# Patient Record
Sex: FEMALE | Race: WHITE | NOT HISPANIC OR LATINO | Employment: OTHER | URBAN - METROPOLITAN AREA
[De-identification: names, ages, dates, MRNs, and addresses within clinical notes are randomized per-mention and may not be internally consistent; named-entity substitution may affect disease eponyms.]

---

## 2017-02-02 ENCOUNTER — PATIENT MESSAGE (OUTPATIENT)
Dept: INTERNAL MEDICINE | Facility: CLINIC | Age: 72
End: 2017-02-02

## 2017-02-08 ENCOUNTER — TELEPHONE (OUTPATIENT)
Dept: INTERNAL MEDICINE | Facility: CLINIC | Age: 72
End: 2017-02-08

## 2017-02-08 NOTE — TELEPHONE ENCOUNTER
GO form for travel completed today    Please remind her to get FLU shot    Need EPP June 2017- please schedule or place on hold list thanks

## 2017-02-22 ENCOUNTER — OFFICE VISIT (OUTPATIENT)
Dept: OPTOMETRY | Facility: CLINIC | Age: 72
End: 2017-02-22
Payer: MEDICARE

## 2017-02-22 DIAGNOSIS — H52.4 BILATERAL PRESBYOPIA: ICD-10-CM

## 2017-02-22 DIAGNOSIS — H25.13 NUCLEAR SCLEROSIS, BILATERAL: Primary | ICD-10-CM

## 2017-02-22 PROCEDURE — 92014 COMPRE OPH EXAM EST PT 1/>: CPT | Mod: S$GLB,,, | Performed by: OPTOMETRIST

## 2017-02-22 PROCEDURE — 99999 PR PBB SHADOW E&M-EST. PATIENT-LVL III: CPT | Mod: PBBFAC,,, | Performed by: OPTOMETRIST

## 2017-02-22 NOTE — PROGRESS NOTES
HPI     Ross Rodriguez is a/an 71 y.o. Female  Who comes in for continued eye care   She reports  that she has no trouble with her  vision  with her current   glasses. She uses no glasses for her distance and readers for near vision.         (--)blurred vision  (--)Headaches  (--)diplopia  (--)flashes  (--)floaters  (--)pain  (--)Itching  (--)tearing  (--)burning  (--)Dryness  (--) OTC Drops  (--)Photophobia       Last edited by Kalpana Mao on 2/22/2017 11:43 AM.     ROS     Negative for: Constitutional, Gastrointestinal, Neurological, Skin,   Genitourinary, Musculoskeletal, HENT, Endocrine, Cardiovascular, Eyes,   Respiratory, Psychiatric, Allergic/Imm, Heme/Lymph    Last edited by Quan Lozano, OD on 2/22/2017 11:57 AM. (History)        Assessment /Plan     For exam results, see Encounter Report.    Nuclear sclerosis, bilateral    Bilateral presbyopia      1. Educated pt on presence of cataracts and effects on vision. No surgery at this time. Recheck in one year.  2. Cont with OTC readers.

## 2017-06-23 ENCOUNTER — PATIENT MESSAGE (OUTPATIENT)
Dept: INTERNAL MEDICINE | Facility: CLINIC | Age: 72
End: 2017-06-23

## 2017-06-23 DIAGNOSIS — E55.9 VITAMIN D DEFICIENCY DISEASE: ICD-10-CM

## 2017-06-23 DIAGNOSIS — M81.8 OTHER OSTEOPOROSIS: ICD-10-CM

## 2017-06-23 DIAGNOSIS — E78.2 MIXED HYPERLIPIDEMIA: Primary | ICD-10-CM

## 2017-06-23 DIAGNOSIS — Z12.31 SCREENING MAMMOGRAM, ENCOUNTER FOR: ICD-10-CM

## 2017-06-23 NOTE — TELEPHONE ENCOUNTER
Pt will like  orders for Mammo, Bone Density and blood work.  Likely to do blood work on the 6th.

## 2017-06-23 NOTE — TELEPHONE ENCOUNTER
Spoke to pt scheduled pt appt for mammo and bone density at [facility] on Wednesday and labs on the 6th of July

## 2017-06-28 ENCOUNTER — HOSPITAL ENCOUNTER (OUTPATIENT)
Dept: RADIOLOGY | Facility: HOSPITAL | Age: 72
Discharge: HOME | End: 2017-06-28
Attending: INTERNAL MEDICINE
Payer: MEDICARE

## 2017-06-28 ENCOUNTER — HOSPITAL ENCOUNTER (OUTPATIENT)
Dept: RADIOLOGY | Facility: CLINIC | Age: 72
Discharge: HOME | End: 2017-06-28
Attending: INTERNAL MEDICINE
Payer: MEDICARE

## 2017-06-28 VITALS — HEIGHT: 60 IN | BODY MASS INDEX: 31.02 KG/M2 | WEIGHT: 158 LBS

## 2017-06-28 DIAGNOSIS — M81.8 OTHER OSTEOPOROSIS: ICD-10-CM

## 2017-06-28 DIAGNOSIS — Z12.31 SCREENING MAMMOGRAM, ENCOUNTER FOR: ICD-10-CM

## 2017-06-28 PROCEDURE — 77067 SCR MAMMO BI INCL CAD: CPT | Mod: TC

## 2017-06-28 PROCEDURE — 77063 BREAST TOMOSYNTHESIS BI: CPT | Mod: 26,,, | Performed by: RADIOLOGY

## 2017-06-28 PROCEDURE — 77067 SCR MAMMO BI INCL CAD: CPT | Mod: 26,,, | Performed by: RADIOLOGY

## 2017-06-28 PROCEDURE — 77080 DXA BONE DENSITY AXIAL: CPT | Mod: TC

## 2017-06-28 PROCEDURE — 77080 DXA BONE DENSITY AXIAL: CPT | Mod: 26,,, | Performed by: INTERNAL MEDICINE

## 2017-07-03 DIAGNOSIS — I71.21 ASCENDING AORTIC ANEURYSM: Primary | ICD-10-CM

## 2017-07-04 ENCOUNTER — PATIENT MESSAGE (OUTPATIENT)
Dept: INTERNAL MEDICINE | Facility: CLINIC | Age: 72
End: 2017-07-04

## 2017-07-06 ENCOUNTER — LAB VISIT (OUTPATIENT)
Dept: LAB | Facility: HOSPITAL | Age: 72
End: 2017-07-06
Attending: INTERNAL MEDICINE
Payer: MEDICARE

## 2017-07-06 ENCOUNTER — PATIENT MESSAGE (OUTPATIENT)
Dept: INTERNAL MEDICINE | Facility: CLINIC | Age: 72
End: 2017-07-06

## 2017-07-06 DIAGNOSIS — E78.2 MIXED HYPERLIPIDEMIA: ICD-10-CM

## 2017-07-06 DIAGNOSIS — E55.9 VITAMIN D DEFICIENCY DISEASE: ICD-10-CM

## 2017-07-06 LAB
25(OH)D3+25(OH)D2 SERPL-MCNC: 69 NG/ML
CHOLEST/HDLC SERPL: 4 {RATIO}
HDL/CHOLESTEROL RATIO: 25.2 %
HDLC SERPL-MCNC: 202 MG/DL
HDLC SERPL-MCNC: 51 MG/DL
LDLC SERPL CALC-MCNC: 111 MG/DL
NONHDLC SERPL-MCNC: 151 MG/DL
TRIGL SERPL-MCNC: 200 MG/DL

## 2017-07-06 PROCEDURE — 36415 COLL VENOUS BLD VENIPUNCTURE: CPT | Mod: PO

## 2017-07-06 PROCEDURE — 80061 LIPID PANEL: CPT

## 2017-07-06 PROCEDURE — 82306 VITAMIN D 25 HYDROXY: CPT | Mod: PO

## 2017-07-11 ENCOUNTER — OFFICE VISIT (OUTPATIENT)
Dept: INTERNAL MEDICINE | Facility: CLINIC | Age: 72
End: 2017-07-11
Payer: MEDICARE

## 2017-07-11 VITALS
HEIGHT: 61 IN | HEIGHT: 61 IN | SYSTOLIC BLOOD PRESSURE: 118 MMHG | HEART RATE: 73 BPM | BODY MASS INDEX: 30.39 KG/M2 | WEIGHT: 160.94 LBS | SYSTOLIC BLOOD PRESSURE: 118 MMHG | BODY MASS INDEX: 30.39 KG/M2 | DIASTOLIC BLOOD PRESSURE: 60 MMHG | WEIGHT: 160.94 LBS | DIASTOLIC BLOOD PRESSURE: 60 MMHG

## 2017-07-11 DIAGNOSIS — Z00.00 ENCOUNTER FOR PREVENTIVE HEALTH EXAMINATION: Primary | ICD-10-CM

## 2017-07-11 DIAGNOSIS — E78.2 MIXED HYPERLIPIDEMIA: ICD-10-CM

## 2017-07-11 DIAGNOSIS — M81.0 OSTEOPOROSIS WITHOUT CURRENT PATHOLOGICAL FRACTURE, UNSPECIFIED OSTEOPOROSIS TYPE: ICD-10-CM

## 2017-07-11 DIAGNOSIS — I77.819 AORTIC ECTASIA: ICD-10-CM

## 2017-07-11 DIAGNOSIS — Z00.00 ANNUAL PHYSICAL EXAM: Primary | ICD-10-CM

## 2017-07-11 DIAGNOSIS — M81.0 AGE-RELATED OSTEOPOROSIS WITHOUT CURRENT PATHOLOGICAL FRACTURE: ICD-10-CM

## 2017-07-11 DIAGNOSIS — K21.9 GASTROESOPHAGEAL REFLUX DISEASE WITHOUT ESOPHAGITIS: ICD-10-CM

## 2017-07-11 DIAGNOSIS — K57.50 DIVERTICULOSIS OF BOTH SMALL AND LARGE INTESTINE WITHOUT BLEEDING: ICD-10-CM

## 2017-07-11 PROCEDURE — 99999 PR PBB SHADOW E&M-EST. PATIENT-LVL III: CPT | Mod: PBBFAC,,, | Performed by: NURSE PRACTITIONER

## 2017-07-11 PROCEDURE — 99999 PR PBB SHADOW E&M-EST. PATIENT-LVL IV: CPT | Mod: PBBFAC,,, | Performed by: INTERNAL MEDICINE

## 2017-07-11 PROCEDURE — 99499 UNLISTED E&M SERVICE: CPT | Mod: S$GLB,,, | Performed by: NURSE PRACTITIONER

## 2017-07-11 PROCEDURE — 99397 PER PM REEVAL EST PAT 65+ YR: CPT | Mod: S$GLB,,, | Performed by: INTERNAL MEDICINE

## 2017-07-11 PROCEDURE — 99499 UNLISTED E&M SERVICE: CPT | Mod: S$GLB,,, | Performed by: INTERNAL MEDICINE

## 2017-07-11 PROCEDURE — G0439 PPPS, SUBSEQ VISIT: HCPCS | Mod: S$GLB,,, | Performed by: NURSE PRACTITIONER

## 2017-07-11 NOTE — PATIENT INSTRUCTIONS
Counseling and Referral of Other Preventative  (Italic type indicates deductible and co-insurance are waived)    Patient Name: Ross Rodriguez  Today's Date: 7/11/2017      SERVICE LIMITATIONS RECOMMENDATION    Vaccines    · Pneumococcal (once after 65)    · Influenza (annually)    · Hepatitis B (if medium/high risk)    · Prevnar 13      Hepatitis B medium/high risk factors:       - End-stage renal disease       - Hemophiliacs who received Factor VII or         IX concentrates       - Clients of institutions for the mentally             retarded       - Persons who live in the same house as          a HepB carrier       - Homosexual men       - Illicit injectable drug abusers     Pneumococcal: Done, no repeat necessary     Influenza: due fall 2017     Hepatitis B: N/A     Prevnar 13: Done, no repeat necessary    Mammogram (biennial age 50-74)  Annually (age 40 or over)  Done this year, repeat every year    Pap (up to age 70 and after 70 if unknown history or abnormal study last 10 years)    N/A     The USPSTF recommends against screening for cervical cancer in women older than age 65 years who have had adequate prior screening and are not otherwise at high risk for cervical cancer.      Colorectal cancer screening (to age 75)    · Fecal occult blood test (annual)  · Flexible sigmoidoscopy (5y)  · Screening colonoscopy (10y)  · Barium enema   Last done 7/2015, recommend to repeat every 10  years    Diabetes self-management training (no USPSTF recommendations)  Requires referral by treating physician for patient with diabetes or renal disease. 10 hours of initial DSMT sessions of no less than 30 minutes each in a continuous 12-month period. 2 hours of follow-up DSMT in subsequent years.  N/A    Bone mass measurements (age 65 & older, biennial)  Requires diagnosis related to osteoporosis or estrogen deficiency. Biennial benefit unless patient has history of long-term glucocorticoid  Last done 2017, recommend to repeat  every 4  years    Glaucoma screening (no USPSTF recommendation)  Diabetes mellitus, family history   , age 50 or over    American, age 65 or over  Done this year, repeat every year    Medical nutrition therapy for diabetes or renal disease (no recommended schedule)  Requires referral by treating physician for patient with diabetes or renal disease or kidney transplant within the past 3 years.  Can be provided in same year as diabetes self-management training (DSMT), and CMS recommends medical nutrition therapy take place after DSMT. Up to 3 hours for initial year and 2 hours in subsequent years.  N/A    Cardiovascular screening blood tests (every 5 years)  · Fasting lipid panel  Order as a panel if possible  Done this year, repeat every year    Diabetes screening tests (at least every 3 years, Medicare covers annually or at 6-month intervals for prediabetic patients)  · Fasting blood sugar (FBS) or glucose tolerance test (GTT)  Patient must be diagnosed with one of the following:       - Hypertension       - Dyslipidemia       - Obesity (BMI 30kg/m2)       - Previous elevated impaired FBS or GTT       ... or any two of the following:       - Overweight (BMI 25 but <30)       - Family history of diabetes       - Age 65 or older       - History of gestational diabetes or birth of baby weighing more than 9 pounds  Done this year, repeat every year    Abdominal aortic aneurysm screening (once)  · Sonogram   Limited to patients who meet one of the following criteria:       - Men who are 65-75 years old and have smoked more than 100 cigarette in their lifetime       - Anyone with a family history of abdominal aortic aneurysm       - Anyone recommended for screening by the USPSTF  N/A    HIV screening (annually for increased risk patients)  · HIV-1 and HIV-2 by EIA, or KATE, rapid antibody test or oral mucosa transudate  Patients must be at increased risk for HIV infection per USPSTF guidelines or  pregnant. Tests covered annually for patient at increased risk or as requested by the patient. Pregnant patients may receive up to 3 tests during pregnancy.  Risks discussed, screening is not recommended    Smoking cessation counseling (up to 8 sessions per year)  Patients must be asymptomatic of tobacco-related conditions to receive as a preventative service.  Non-smoker    Subsequent annual wellness visit  At least 12 months since last AWV  Return in one year     The following information is provided to all patients.  This information is to help you find resources for any of the problems found today that may be affecting your health:                Living healthy guide: www.[facility].[place]      Understanding Diabetes: www.diabetes.org      Eating healthy: www.cdc.gov/healthyweight      CDC home safety checklist: www.cdc.gov/steadi/patient.html      Agency on Aging: www.goea.[place].[facility]      Alcoholics anonymous (AA): www.aa.org      Physical Activity: www.lonnie.nih.gov/he3vjbz      Tobacco use: www.quitwithusla.org

## 2017-07-11 NOTE — PROGRESS NOTES
"Ross Rodriguez presented for a  Medicare AWV and comprehensive Health Risk Assessment today. The following components were reviewed and updated:    · Medical history  · Family History  · Social history  · Allergies and Current Medications  · Health Risk Assessment  · Health Maintenance  · Care Team     ** See Completed Assessments for Annual Wellness Visit within the encounter summary.**       The following assessments were completed:  · Living Situation  · CAGE  · Depression Screening  · Timed Get Up and Go  · Whisper Test  · Cognitive Function Screening  ·   ·   ·   · Nutrition Screening  · ADL Screening  · PAQ Screening    Vitals:    07/11/17 1344   BP: 118/60   BP Location: Left arm   Patient Position: Sitting   BP Method: Manual   Pulse: 73   Weight: 73 kg (160 lb 15 oz)   Height: 5' 1" (1.549 m)     Body mass index is 30.41 kg/m².  Physical Exam   Constitutional: She is oriented to person, place, and time. She appears well-developed and well-nourished.   Musculoskeletal: Normal range of motion.   Neurological: She is alert and oriented to person, place, and time.   Skin: Skin is warm and dry.   Psychiatric: She has a normal mood and affect.         Diagnoses and health risks identified today and associated recommendations/orders:    1. Encounter for preventive health examination  Here for Health Risk Assessment. Follow up in one year.    2. Aortic ectasia: ascending aorta 4.7 cm 2016 (CT)  Chronic, stable. Followed by PCP, Cardiothoracic Surgery.    3. Mixed hyperlipidemia  Chronic, stable with diet. Followed by PCP.    4. Age-related osteoporosis without current pathological fracture  Chronic, stable. Followed by PCP.    5. Gastroesophageal reflux disease without esophagitis  Chronic, stable. Followed by PCP.    6. Diverticulosis of small and large intestine see CT scan 7/15  Chronic, stable. Followed by PCP.      Provided Ross with a 5-10 year written screening schedule and personal prevention plan. " Recommendations were developed using the USPSTF age appropriate recommendations. Education, counseling, and referrals were provided as needed. After Visit Summary printed and given to patient which includes a list of additional screenings\tests needed.    Return in about 1 year (around 7/11/2018).with PCP.    Karon Machuca NP

## 2017-07-11 NOTE — PATIENT INSTRUCTIONS
Advance Medical Directive    An advance medical directive is a form that lets you plan ahead for the care youd want if you could no longer express your wishes. This statement outlines the medical treatment youd want or names the person youd wish to make healthcare decisions for you. Be aware that laws vary from state to state, and it may be worthwhile to talk with an .  Writing down your wishes  · An advance directive is important whether youre young or old. Injury or illness can strike at any age.  · Decide what is important to you and the kind of treatment youd want, or not want to have.  · Some states allow only one kind of advance directive. Some let you do both a Durable Power of  for Health Care and a Living Will. Some states put both kinds on the same form.  A Durable Power of  for Health Care  · This form lets you name someone else to be your agent.  · This person can decide on treatment for you only when you cant speak for yourself.  · You do not need to be at the end of your life. He or she could speak for you if you were in a coma but were likely to recover.  A Living Will  · This form lets you list the care you want at the end of your life.  · A living will applies only if you wont live without medical treatment. It would apply if you had advanced cancer, a massive stroke, or other serious illness from which you will not recover.  · It takes effect only when you can no longer express your wishes yourself.  Date Last Reviewed: 2/13/2016  © 5977-6194 [employer]. [address] [place] [address], [place] [address]. All rights reserved. This information is not intended as a substitute for professional medical care. Always follow your healthcare professional's instructions.        An Agents Role for Durable Power of     Its impossible to know which medical treatment choices you might face in the future. What if you aren't able to make these decisions for  yourself? A durable power of  for health care lets you name an agent to carry out your wishes. This happens only if you cant express your wishes yourself.  An agents duty  Your agent respects your wishes in the following ways:   · Your agents duty is to see that your wishes are followed.  · If your wishes arent known, your agent should try to decide what you want.  · Your agents choices come before anyone elses wishes for you.  · A durable power of  for health care does not give your agent control over your money. Your agent also cant be made to pay your bills.  Find out what your agent can do  Restrictions on what an agent can and cant do vary by state. Check your state laws. In most states your agent can:  · Choose or refuse life-sustaining and other medical treatment on your behalf.  · Consent to and then stop treatment if your condition doesnt improve.  · Access and release your medical records.  · Request an autopsy and donate your organs, unless youve stated otherwise on your advance directive.  Find out whether your state allows your agent to do the following:  · Refuse or withdraw life-enhancing care.  · Refuse or stop tube feeding or other life-sustaining care--even if you havent stated on your advance directive that you dont want these treatments.  · Order sterilization or .  Date Last Reviewed: 2016-2016 [employer]. [address] [place] [address], [place] [address]. All rights reserved. This information is not intended as a substitute for professional medical care. Always follow your healthcare professional's instructions.        Living with Osteoporosis: Preventing Fractures  If you have osteoporosis, you can do a lot to reduce its effect on your life. Knowing how to prevent fractures and spinal curvature can help you live more comfortably and safely with this disease.    Reducing your risk for fractures  The most common fracture sites in people  with osteoporosis are the wrist, spine, and hip. These fractures are often caused by accidents and falls. All fractures are painful and may limit what you can do. But hip fractures are very serious. They need surgery, and it can take months to recover. To reduce your risk for fractures:  · Get regular exercise. Try walking, swimming, or weight training.  · Eat foods that are rich in calcium, or take calcium supplements.  · Make your home safe to help avoid accidents.  · Take extra precautions not to fall in risky areas, such as icy sidewalks.  Understanding spinal fractures  Your spine is made up of many bones called vertebrae. Osteoporosis can cause the vertebrae in your spine to collapse. As a result, your upper back may arch forward, creating a curvature. Spine fractures may also result from back strain and bad posture. You will also lose height. Your lower spine must then adjust to keep your body balanced. This can cause back pain. To prevent or lessen these spinal changes:  · Practice good posture.  · Use proper techniques if you need to lift heavy objects.  · Do back exercises to help your posture.  · Lie on your back when you have pain.  · Ask your healthcare provider about these and other ways to help your spine.  Date Last Reviewed: 10/17/2015  © 4323-1209 [employer]. [address] [place] [address], [place] [id number]. All rights reserved. This information is not intended as a substitute for professional medical care. Always follow your healthcare professional's instructions.        Preventing Osteoporosis: Meeting Your Calcium Needs    Your body needs calcium to build and repair bones. But it can't make calcium on its own. That's why it's important to eat calcium-rich foods. Some foods are naturally rich in calcium. Others have calcium added (fortified). It's best to get calcium from the foods you eat. But if you can't get enough, you may want to take calcium supplements. To meet your daily calcium  needs, try the foods listed below.  Dairy Fish & beans Other sources      Source   Calcium (mg) per serving   Source   Calcium (mg) per serving   Source   Calcium (mg) per serving      Low-fat yogurt, plain   415 mg/8 oz.   Sardines, Atlantic, canned, with bones   351 mg/3 oz.   Oatmeal, instant, fortified   215 mg/1 cup   Nonfat milk   302 mg/1 cup   [place], sockeye, canned, with bones   239 mg/3 oz.   Tofu made with calcium sulfate   204 mg/3 oz.   Low-fat milk   297 mg/1 cup   Soybeans, fresh, boiled   131 mg/1/2 cup   Collards   179 mg/1/2 cup   Swiss cheese   272 mg/1 oz.   White beans, cooked   81 mg/1/2 cup   English muffin, whole wheat   175 mg/1 muffin   Cheddar cheese   205 mg/1 oz.   Navy beans, cooked   79 mg/1/2 cup   Kale   90 mg/1/2 cup   Ice cream strawberry   79 mg/1/2 cup           Orange, navel   56 mg/1 medium   Note: Calcium levels may vary depending on brand and size.  Daily calcium needs  14-18 years old: 1,300 mg  19-30 years old: 1,000 mg  31-50 years old: 1,000 mg  51-70 years old, women: 1,200 mg  51-70 years old, men: 1,000 mg  Pregnant or nursin-28 years old: 1,300 mg, 19-50 years old: 1,000 mg  Older than 70 (women and men): 1,200 mg   Date Last Reviewed: 10/17/2015  © 1890-6053 [employer]. [address] [place] [address]. All rights reserved. This information is not intended as a substitute for professional medical care. Always follow your healthcare professional's instructions.        Preventing Osteoporosis: Staying Active  Certain factors can speed up bone loss or decrease bone growth. For example, a lack of activity makes bones lose their strength. Exercise plays a big part in maintaining bone mass, no matter what your age. The amount and type of activity you do also play a part in keeping your bones strong. Weight-bearing and resistance exercises, such as walking, aerobic dancing, and bicycling, are just a few of the activities that are good for your  bones.     Resistance exercises, such as weight training, help maintain bones by strengthening the muscles around them. Swimming is also a good choice.     · Check with your healthcare provider before starting any new exercise program.  · Stop any exercise that causes pain.   Date Last Reviewed: 10/17/2015  © 9870-0028 [employer]. [address] [place] [address]. All rights reserved. This information is not intended as a substitute for professional medical care. Always follow your healthcare professional's instructions.        Preventing Osteoporosis: Avoiding Bone Loss  Certain factors can speed up bone loss or decrease bone growth. For example, alcohol, cigarettes, and certain medicines reduce bone mass. Some foods make it hard for your body to absorb calcium.    Things to avoid  Here are things to avoid to help prevent osteoporosis:  · Alcohol is toxic to bones. It is a major cause of bone loss. Heavy drinking can cause osteoporosis even if you have no other risk factors.  · Smoking reduces bone mass. Smoking may also interfere with estrogen levels and cause early menopause.  · Inactivity makes your bones lose strength and become thinner. Over time, thin bones may break. Women who aren't active are at a high risk for osteoporosis.  · Certain medicines, such as cortisone, increase bone loss. They also decrease bone growth. Ask your healthcare provider about any side effects of your medicines, and how to prevent them.  · Protein-rich or salty foods eaten in large amounts may deplete calcium.  · Caffeine increases calcium loss. People who drink a lot of coffee, tea, or janine lose more calcium than those who don't.  Date Last Reviewed: 10/17/2015  © 1251-5355 [employer]. [address] [place] [address]. All rights reserved. This information is not intended as a substitute for professional medical care. Always follow your healthcare professional's  instructions.        Osteoporosis: Understanding Bone Loss     A balanced system keeps building and resorbing bone.   The body has a natural system for maintaining bone. Understanding this system can help you learn how to maintain your bones.  A balanced system supports the body  The body is always losing (resorbing) and making bone. This process is called remodeling. Bone-resorbing cells take bone apart. They do this so the minerals can be used to repair an injury or make new bone. Bone-making cells form new bone using calcium and other minerals. These minerals come from the food you eat. When this bone-making system is in balance, the same amount of bone is built and resorbed.        An unbalanced system cant give support     An unblalanced system builds too little bone and resorbs too much.   Changes in hormone levels, activity, medications, or diet can affect the bone-making system. When the system gets out of balance, the amount of bone lost is greater than the amount of bone made. This can cause osteopenia (when bone starts to become less dense). Left untreated, bone loss gets worse, leading to osteoporosis. Weak bones cant support the body. In fact, they can fracture just from the weight of your body. This often happens in vertebrae (bones of the spine). When vertebrae fracture, parts of the spine compress. This causes the back to bend or hump over, and it can also cause back pain.  Date Last Reviewed: 10/11/2015  © 7659-3011 [employer]. [address] [place] [address], [place] [address]. All rights reserved. This information is not intended as a substitute for professional medical care. Always follow your healthcare professional's instructions.

## 2017-07-11 NOTE — PROGRESS NOTES
Subjective:       Patient ID: Ross Rodriguez is a 72 y.o. female.    Chief Complaint: Annual Exam    Annual exam    Recall that in 2009, she was seen by cardiology because of some atypical chest symptoms.  She had a CT scan that revealed an ectatic aorta with slight enlargement of the thoracic descending aorta (4 cm).  This was reviewed.  She had a follow-up CT 2016 thoracic diameter 4.7 cm in diameter  She has not had chest pain, pressure, tightness or any shortness of breath.     She does exercise regularly and can walk a mile and 20 minutes or so on a treadmill.  Can't do yoga due to head feeling funny and some nausea.     DEXA revealed osteoporosis- reviewed.  She has not been able to tolerate bisphosphonates.  Is in agreement to discussing this with endocrine again.  She does not smoke.  No falls or fractures.     She had a colonoscopy in 2015 which was acceptable other than diverticulosis.    Patient Active Problem List:     Gastroesophageal reflux disease without esophagitis     Nuclear sclerosis - Both Eyes     Tinnitus     Diverticulosis of small and large intestine see CT scan 7/15     Mixed hyperlipidemia     Aortic ectasia: ascending aorta 4.7 cm 2016 (CT)     Lung cyst: R side see CT 2009     Liver cyst: see CT 2009     Primary insomnia     Osteoporosis without current pathological fracture          Review of Systems   Constitutional: Negative for activity change, appetite change, chills, fatigue and fever.   HENT: Negative for congestion, hearing loss, sinus pressure and sore throat.    Eyes: Negative for visual disturbance.   Respiratory: Negative for apnea, cough, shortness of breath and wheezing.    Cardiovascular: Negative for chest pain, palpitations and leg swelling.   Gastrointestinal: Negative for abdominal distention, abdominal pain, constipation, diarrhea, nausea and vomiting.        Minimal if any GERD   Genitourinary: Negative for dysuria, frequency, hematuria and vaginal bleeding.    Musculoskeletal: Negative for gait problem, joint swelling and myalgias.        When she bends over doing yoga, she sometimes has a sensation of nausea and headache but symptoms only occur in that position and no other problems.   Skin: Negative for rash.   Neurological: Negative for dizziness, weakness, light-headedness and headaches.   Hematological: Negative for adenopathy. Does not bruise/bleed easily.   Psychiatric/Behavioral: Negative for confusion, hallucinations, sleep disturbance and suicidal ideas.       Objective:      Physical Exam   Constitutional: She is oriented to person, place, and time. She appears well-developed and well-nourished.   HENT:   Head: Normocephalic and atraumatic.   Right Ear: External ear normal.   Left Ear: External ear normal.   Nose: Nose normal.   Mouth/Throat: Oropharynx is clear and moist. No oropharyngeal exudate.   Eyes: Conjunctivae and EOM are normal. No scleral icterus.   Neck: Normal range of motion. Neck supple. No JVD present. No thyromegaly present.   Cardiovascular: Normal rate, regular rhythm, normal heart sounds and intact distal pulses.  Exam reveals no gallop.    No murmur heard.  Pulmonary/Chest: Effort normal and breath sounds normal. No respiratory distress. She has no wheezes.   Abdominal: Soft. Bowel sounds are normal. She exhibits no distension and no mass. There is no tenderness. There is no rebound and no guarding.   Musculoskeletal: Normal range of motion. She exhibits no edema or tenderness.   Lymphadenopathy:     She has no cervical adenopathy.   Neurological: She is alert and oriented to person, place, and time. She displays normal reflexes. No cranial nerve deficit. Coordination normal.   Skin: Skin is warm. No rash noted. No erythema.   Psychiatric: She has a normal mood and affect. Her behavior is normal. Judgment and thought content normal.   Nursing note and vitals reviewed.      Assessment:       1. Annual physical exam    2. Aortic ectasia:  ascending aorta 4.7 cm 2016 (CT)    3. Mixed hyperlipidemia    4. Osteoporosis without current pathological fracture, unspecified osteoporosis type: see DEXA 2017        Plan:         Annual physical exam    Aortic ectasia: ascending aorta 4.7 cm 2016 (CT): Keep appointment for CT and cardiothoracic surgery follow-up    Mixed hyperlipidemia: Has declined treatment.  No evidence of ASCVD    Osteoporosis without current pathological fracture, unspecified osteoporosis type: see DEXA 2017: Cautions reviewed.  No falls.  Osteoporosis prevention discussed  -     Ambulatory consult to Endocrinology    Screenings up to date  No falls, no fall risk  No dementia or depression  Immunizations up to date  Advance directives discussed- handouts given    EPP with me 1 month; call or return sooner with problems prior

## 2017-07-27 ENCOUNTER — HOSPITAL ENCOUNTER (OUTPATIENT)
Dept: RADIOLOGY | Facility: HOSPITAL | Age: 72
Discharge: HOME | End: 2017-07-27
Attending: THORACIC SURGERY (CARDIOTHORACIC VASCULAR SURGERY)
Payer: MEDICARE

## 2017-07-27 ENCOUNTER — OFFICE VISIT (OUTPATIENT)
Dept: CARDIOTHORACIC SURGERY | Facility: CLINIC | Age: 72
End: 2017-07-27
Payer: MEDICARE

## 2017-07-27 VITALS
DIASTOLIC BLOOD PRESSURE: 67 MMHG | OXYGEN SATURATION: 98 % | HEART RATE: 67 BPM | TEMPERATURE: 98 F | HEIGHT: 61 IN | WEIGHT: 161.19 LBS | BODY MASS INDEX: 30.43 KG/M2 | SYSTOLIC BLOOD PRESSURE: 133 MMHG

## 2017-07-27 DIAGNOSIS — I77.819 AORTIC ECTASIA: Primary | ICD-10-CM

## 2017-07-27 DIAGNOSIS — I71.21 ASCENDING AORTIC ANEURYSM: ICD-10-CM

## 2017-07-27 PROCEDURE — 99999 PR PBB SHADOW E&M-EST. PATIENT-LVL III: CPT | Mod: PBBFAC,,, | Performed by: THORACIC SURGERY (CARDIOTHORACIC VASCULAR SURGERY)

## 2017-07-27 PROCEDURE — 71250 CT THORAX DX C-: CPT | Mod: 26,,, | Performed by: RADIOLOGY

## 2017-07-27 PROCEDURE — 99499 UNLISTED E&M SERVICE: CPT | Mod: S$GLB,,, | Performed by: THORACIC SURGERY (CARDIOTHORACIC VASCULAR SURGERY)

## 2017-07-27 PROCEDURE — 99024 POSTOP FOLLOW-UP VISIT: CPT | Mod: S$GLB,,, | Performed by: THORACIC SURGERY (CARDIOTHORACIC VASCULAR SURGERY)

## 2017-07-27 NOTE — PROGRESS NOTES
Subjective:      Patient ID: Ross Rodriguez is a 72 y.o. female.    Chief Complaint: Follow-up      HPI:  Ross Rodriguez is a 72 y.o. female who present with PMHx of TAA (2009), HLD and GERD. Pt had CTA of chest from PCP, Dr. Nolasco and TAA was found to be 4.7cm in 2016. Pt complaints of nausea with yoga pose. She does not require HTN medications and is a nonsmoker. Father has hx of AAA.      Current medications Reviewed    Review of Systems    Constitutional: Negative for fever and malaise/fatigue.   HENT: Negative for congestion and sore throat.    Eyes: Negative for blurred vision, double vision and discharge.   Respiratory: Negative for cough, shortness of breath and wheezing.    Cardiovascular: Negative for chest pain, palpitations, claudication, leg swelling and PND.   Gastrointestinal: Negative for abdominal pain, constipation, diarrhea, nausea and vomiting.   Genitourinary: Negative for dysuria, frequency and urgency.   Musculoskeletal: Negative for back pain and myalgias.   Skin: Negative for itching and rash.   Neurological: Negative for dizziness, speech change, seizures, weakness and headaches.   Endo/Heme/Allergies: Does not bruise/bleed easily.   Psychiatric/Behavioral: Negative for depression. The patient is not nervous/anxious.        Objective:   Physical Exam   Constitutional: Patient appears well-developed and well-nourished.   HENT:   Head: Normocephalic and atraumatic.   Eyes: Pupils are equal, round, and reactive to light.   Neck: Normal range of motion. Neck supple.   Cardiovascular: Normal rate, regular rhythm and normal heart sounds.    Pulmonary/Chest: Effort normal and breath sounds normal.   Abdominal: Soft. Bowel sounds are normal.   Musculoskeletal: Normal range of motion.   Neurological: Alert and oriented to person, place, and time.   Skin: Skin is warm and dry.   Psychiatric: Normal mood and affect. Behavior is normal.       Assessment:    1. TAA  2. HLD  3. GERD  Plan:       CTS  Attending Note:    I have personally taken the history and examined this patient and agree with the NP's note as stated above. CT reviewed. No sig change in size. Plan repeat in 1 year.

## 2017-08-01 ENCOUNTER — OFFICE VISIT (OUTPATIENT)
Dept: DERMATOLOGY | Facility: CLINIC | Age: 72
End: 2017-08-01
Payer: MEDICARE

## 2017-08-01 DIAGNOSIS — L81.4 LENTIGO: Primary | ICD-10-CM

## 2017-08-01 DIAGNOSIS — D18.00 ANGIOMA: ICD-10-CM

## 2017-08-01 DIAGNOSIS — D22.9 NEVUS: ICD-10-CM

## 2017-08-01 DIAGNOSIS — L82.1 SK (SEBORRHEIC KERATOSIS): ICD-10-CM

## 2017-08-01 PROCEDURE — 99202 OFFICE O/P NEW SF 15 MIN: CPT | Mod: S$GLB,,, | Performed by: DERMATOLOGY

## 2017-08-01 PROCEDURE — 1126F AMNT PAIN NOTED NONE PRSNT: CPT | Mod: S$GLB,,, | Performed by: DERMATOLOGY

## 2017-08-01 PROCEDURE — 99999 PR PBB SHADOW E&M-EST. PATIENT-LVL II: CPT | Mod: PBBFAC,,, | Performed by: DERMATOLOGY

## 2017-08-01 PROCEDURE — 1159F MED LIST DOCD IN RCRD: CPT | Mod: S$GLB,,, | Performed by: DERMATOLOGY
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
## 2017-11-01 ENCOUNTER — OFFICE VISIT (OUTPATIENT)
Dept: ENDOCRINOLOGY | Facility: CLINIC | Age: 72
End: 2017-11-01
Payer: MEDICARE

## 2017-11-01 VITALS
BODY MASS INDEX: 29.72 KG/M2 | HEART RATE: 72 BPM | WEIGHT: 157.44 LBS | SYSTOLIC BLOOD PRESSURE: 130 MMHG | DIASTOLIC BLOOD PRESSURE: 72 MMHG | HEIGHT: 61 IN

## 2017-11-01 DIAGNOSIS — M81.8 OTHER OSTEOPOROSIS WITHOUT CURRENT PATHOLOGICAL FRACTURE: ICD-10-CM

## 2017-11-01 DIAGNOSIS — M81.0 AGE-RELATED OSTEOPOROSIS WITHOUT CURRENT PATHOLOGICAL FRACTURE: Primary | ICD-10-CM

## 2017-11-01 DIAGNOSIS — E78.2 MIXED HYPERLIPIDEMIA: ICD-10-CM

## 2017-11-01 PROCEDURE — 99999 PR PBB SHADOW E&M-EST. PATIENT-LVL III: CPT | Mod: PBBFAC,,, | Performed by: INTERNAL MEDICINE

## 2017-11-01 PROCEDURE — 99214 OFFICE O/P EST MOD 30 MIN: CPT | Mod: S$GLB,,, | Performed by: INTERNAL MEDICINE

## 2017-11-01 NOTE — LETTER
November 1, 2017      Nahomi Nolasco MD  [address]  [facility] [address]           [facility] - Endo/Diab/Metab  [address]  [facility] [address]  Phone: [phone]  Fax: [phone]          Patient: Ross Rodriguez   MR Number: [id number]   YOB: 1945   Date of Visit: 11/1/2017       Dear Dr. Nahomi Nolasco:    Thank you for referring Ross Rodriguez to me for evaluation. Attached you will find relevant portions of my assessment and plan of care.    If you have questions, please do not hesitate to call me. I look forward to following Ross Rodriguez along with you.    Sincerely,    Paty Kay, NP    Enclosure  CC:  No Recipients    If you would like to receive this communication electronically, please contact [email or web address] or [phone] to request more information on [employer] Link access.    For providers and/or their staff who would like to refer a patient to [facility], please contact us through our one-stop-shop provider referral line, [facility], at [phone].    If you feel you have received this communication in error or would no longer like to receive these types of communications, please e-mail [email or web address]
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
## 2017-11-16 ENCOUNTER — INFUSION (OUTPATIENT)
Dept: INFECTIOUS DISEASES | Facility: HOSPITAL | Age: 72
End: 2017-11-16
Attending: INTERNAL MEDICINE
Payer: MEDICARE

## 2017-11-16 VITALS — BODY MASS INDEX: 29.72 KG/M2 | WEIGHT: 157.44 LBS | HEIGHT: 61 IN

## 2017-11-16 DIAGNOSIS — M81.0 AGE-RELATED OSTEOPOROSIS WITHOUT CURRENT PATHOLOGICAL FRACTURE: Primary | ICD-10-CM

## 2017-11-16 PROCEDURE — 96372 THER/PROPH/DIAG INJ SC/IM: CPT

## 2017-11-16 PROCEDURE — 63600175 PHARM REV CODE 636 W HCPCS: Performed by: INTERNAL MEDICINE

## 2017-11-16 RX ADMIN — DENOSUMAB 60 MG: 60 INJECTION SUBCUTANEOUS at 09:11

## 2017-11-16 NOTE — PLAN OF CARE
Problem: Health Knowledge, Opportunity to Enhance (Adult,NICU,[place],Obstetrics,Pediatric)  Goal: Knowledgeable about Health Subject/Topic  Patient will demonstrate the desired outcomes by discharge/transition of care.  Outcome: Ongoing (interventions implemented as appropriate)  PATIENT EDUCATED ON HAND WASHING AND INFECTION CONTROL. PATIENT VERBALIZED UNDERSTANDING

## 2018-02-23 ENCOUNTER — OFFICE VISIT (OUTPATIENT)
Dept: OPTOMETRY | Facility: CLINIC | Age: 73
End: 2018-02-23
Payer: COMMERCIAL

## 2018-02-23 DIAGNOSIS — H25.13 NUCLEAR SCLEROSIS, BILATERAL: Primary | ICD-10-CM

## 2018-02-23 DIAGNOSIS — H52.4 BILATERAL PRESBYOPIA: ICD-10-CM

## 2018-02-23 PROCEDURE — 92014 COMPRE OPH EXAM EST PT 1/>: CPT | Mod: S$GLB,,, | Performed by: OPTOMETRIST

## 2018-02-23 PROCEDURE — 99999 PR PBB SHADOW E&M-EST. PATIENT-LVL II: CPT | Mod: PBBFAC,,, | Performed by: OPTOMETRIST

## 2018-02-23 RX ORDER — ACETAMINOPHEN 500 MG/1
CAPSULE, LIQUID FILLED ORAL
COMMUNITY
Start: 2018-01-27

## 2018-02-23 RX ORDER — FLUPHENAZINE DECANOATE 25 MG/ML
INJECTION, SOLUTION INTRAMUSCULAR; SUBCUTANEOUS
COMMUNITY
End: 2018-08-21 | Stop reason: CLARIF

## 2018-02-23 NOTE — PROGRESS NOTES
BETI PARTIDA 02/2017 Glasses about 2 yrs. Old.  Hasn't noticed any changes.   Eyestrain after reading awhile or using computer. Patient defers   refraction today.    Last edited by Angela Lino on 2/23/2018  1:01 PM. (History)            Assessment /Plan     For exam results, see Encounter Report.    Nuclear sclerosis, bilateral    Bilateral presbyopia      1. Educated pt on presence of cataracts and effects on vision. No surgery at this time. Recheck in one year.  2. Cont with spec rx.

## 2018-03-16 ENCOUNTER — PATIENT MESSAGE (OUTPATIENT)
Dept: INTERNAL MEDICINE | Facility: CLINIC | Age: 73
End: 2018-03-16

## 2018-03-28 ENCOUNTER — TELEPHONE (OUTPATIENT)
Dept: INFECTIOUS DISEASES | Facility: HOSPITAL | Age: 73
End: 2018-03-28

## 2018-04-04 ENCOUNTER — PES CALL (OUTPATIENT)
Dept: ADMINISTRATIVE | Facility: CLINIC | Age: 73
End: 2018-04-04

## 2018-05-17 ENCOUNTER — INFUSION (OUTPATIENT)
Dept: INFECTIOUS DISEASES | Facility: HOSPITAL | Age: 73
End: 2018-05-17
Attending: INTERNAL MEDICINE
Payer: MEDICARE

## 2018-05-17 DIAGNOSIS — M81.0 AGE-RELATED OSTEOPOROSIS WITHOUT CURRENT PATHOLOGICAL FRACTURE: Primary | ICD-10-CM

## 2018-05-17 PROCEDURE — 96372 THER/PROPH/DIAG INJ SC/IM: CPT

## 2018-05-17 PROCEDURE — 63600175 PHARM REV CODE 636 W HCPCS: Mod: JG | Performed by: INTERNAL MEDICINE

## 2018-05-17 RX ADMIN — DENOSUMAB 60 MG: 60 INJECTION SUBCUTANEOUS at 10:05

## 2018-06-07 ENCOUNTER — PATIENT MESSAGE (OUTPATIENT)
Dept: INTERNAL MEDICINE | Facility: CLINIC | Age: 73
End: 2018-06-07

## 2018-06-07 DIAGNOSIS — Z12.31 VISIT FOR SCREENING MAMMOGRAM: Primary | ICD-10-CM

## 2018-06-25 ENCOUNTER — PATIENT MESSAGE (OUTPATIENT)
Dept: CARDIOTHORACIC SURGERY | Facility: CLINIC | Age: 73
End: 2018-06-25

## 2018-06-25 DIAGNOSIS — I77.819 AORTIC ECTASIA: Primary | ICD-10-CM

## 2018-06-27 ENCOUNTER — PATIENT MESSAGE (OUTPATIENT)
Dept: INTERNAL MEDICINE | Facility: CLINIC | Age: 73
End: 2018-06-27

## 2018-06-27 RX ORDER — NITROFURANTOIN 25; 75 MG/1; MG/1
100 CAPSULE ORAL 2 TIMES DAILY
Qty: 10 CAPSULE | Refills: 0 | Status: SHIPPED | OUTPATIENT
Start: 2018-06-27 | End: 2018-07-16 | Stop reason: SDUPTHER

## 2018-06-29 ENCOUNTER — HOSPITAL ENCOUNTER (OUTPATIENT)
Dept: RADIOLOGY | Facility: HOSPITAL | Age: 73
Discharge: HOME | End: 2018-06-29
Attending: INTERNAL MEDICINE
Payer: MEDICARE

## 2018-06-29 VITALS — WEIGHT: 157 LBS | BODY MASS INDEX: 29.66 KG/M2

## 2018-06-29 DIAGNOSIS — Z12.31 VISIT FOR SCREENING MAMMOGRAM: ICD-10-CM

## 2018-06-29 PROCEDURE — 77067 SCR MAMMO BI INCL CAD: CPT | Mod: TC

## 2018-06-29 PROCEDURE — 77063 BREAST TOMOSYNTHESIS BI: CPT | Mod: 26,,, | Performed by: RADIOLOGY

## 2018-06-29 PROCEDURE — 77067 SCR MAMMO BI INCL CAD: CPT | Mod: 26,,, | Performed by: RADIOLOGY

## 2018-07-05 ENCOUNTER — HOSPITAL ENCOUNTER (OUTPATIENT)
Dept: RADIOLOGY | Facility: HOSPITAL | Age: 73
Discharge: HOME | End: 2018-07-05
Attending: INTERNAL MEDICINE
Payer: MEDICARE

## 2018-07-05 DIAGNOSIS — R92.8 ABNORMAL MAMMOGRAM: ICD-10-CM

## 2018-07-05 PROCEDURE — 76642 ULTRASOUND BREAST LIMITED: CPT | Mod: TC,PO,RT

## 2018-07-05 PROCEDURE — 77061 BREAST TOMOSYNTHESIS UNI: CPT | Mod: TC,PO

## 2018-07-05 PROCEDURE — 76642 ULTRASOUND BREAST LIMITED: CPT | Mod: 26,RT,, | Performed by: RADIOLOGY

## 2018-07-05 PROCEDURE — 77061 BREAST TOMOSYNTHESIS UNI: CPT | Mod: 26,,, | Performed by: RADIOLOGY

## 2018-07-05 PROCEDURE — 77065 DX MAMMO INCL CAD UNI: CPT | Mod: TC,PO

## 2018-07-05 PROCEDURE — 77065 DX MAMMO INCL CAD UNI: CPT | Mod: 26,,, | Performed by: RADIOLOGY

## 2018-07-12 ENCOUNTER — PATIENT MESSAGE (OUTPATIENT)
Dept: INTERNAL MEDICINE | Facility: CLINIC | Age: 73
End: 2018-07-12

## 2018-07-13 ENCOUNTER — OFFICE VISIT (OUTPATIENT)
Dept: INTERNAL MEDICINE | Facility: CLINIC | Age: 73
End: 2018-07-13
Payer: MEDICARE

## 2018-07-13 VITALS
HEIGHT: 60 IN | OXYGEN SATURATION: 96 % | DIASTOLIC BLOOD PRESSURE: 78 MMHG | BODY MASS INDEX: 31.68 KG/M2 | WEIGHT: 161.38 LBS | SYSTOLIC BLOOD PRESSURE: 123 MMHG | HEART RATE: 78 BPM

## 2018-07-13 DIAGNOSIS — I77.819 AORTIC ECTASIA: ICD-10-CM

## 2018-07-13 DIAGNOSIS — N30.00 ACUTE CYSTITIS WITHOUT HEMATURIA: Primary | ICD-10-CM

## 2018-07-13 LAB
BILIRUB UR QL STRIP: NEGATIVE
CLARITY UR REFRACT.AUTO: ABNORMAL
COLOR UR AUTO: YELLOW
GLUCOSE UR QL STRIP: NEGATIVE
HGB UR QL STRIP: NEGATIVE
KETONES UR QL STRIP: NEGATIVE
LEUKOCYTE ESTERASE UR QL STRIP: NEGATIVE
NITRITE UR QL STRIP: NEGATIVE
PH UR STRIP: 5 [PH] (ref 5–8)
PROT UR QL STRIP: NEGATIVE
SP GR UR STRIP: 1.02 (ref 1–1.03)
URN SPEC COLLECT METH UR: ABNORMAL
UROBILINOGEN UR STRIP-ACNC: NEGATIVE EU/DL

## 2018-07-13 PROCEDURE — 87077 CULTURE AEROBIC IDENTIFY: CPT

## 2018-07-13 PROCEDURE — 99999 PR PBB SHADOW E&M-EST. PATIENT-LVL III: CPT | Mod: PBBFAC,,, | Performed by: INTERNAL MEDICINE

## 2018-07-13 PROCEDURE — 81003 URINALYSIS AUTO W/O SCOPE: CPT

## 2018-07-13 PROCEDURE — 87086 URINE CULTURE/COLONY COUNT: CPT

## 2018-07-13 PROCEDURE — 87186 SC STD MICRODIL/AGAR DIL: CPT

## 2018-07-13 PROCEDURE — 87088 URINE BACTERIA CULTURE: CPT

## 2018-07-13 PROCEDURE — 99213 OFFICE O/P EST LOW 20 MIN: CPT | Mod: S$GLB,,, | Performed by: INTERNAL MEDICINE

## 2018-07-13 NOTE — PROGRESS NOTES
Subjective:       Patient ID: Ross Rodriguez is a 73 y.o. female.    Chief Complaint: Urinary Tract Infection    [facility] appt    UTI sx    Frequent urination since June 27th.  No fever.  No flank pain.  Slight cramping. We had treated with Macrobid and sx got better.  However since getting off antibiotic has had intermittent frequency.    Patient Active Problem List:     Gastroesophageal reflux disease without esophagitis     Nuclear sclerosis - Both Eyes     Tinnitus     Diverticulosis of small and large intestine see CT scan 7/15     Mixed hyperlipidemia     Aortic ectasia: ascending aorta 4.7 cm 2016 (CT); 4.4 2017     Lung cyst: R side see CT 2009     Liver cyst: see CT 2009     Primary insomnia     Osteoporosis without current pathological fracture     Age-related osteoporosis without current pathological fracture        Review of Systems   Gastrointestinal: Negative for abdominal pain and vomiting.   Genitourinary: Positive for frequency and urgency. Negative for flank pain.        Sx better today   Musculoskeletal: Negative for back pain.       Objective:      Physical Exam   Constitutional: She is oriented to person, place, and time. She appears well-developed and well-nourished.   HENT:   Head: Normocephalic and atraumatic.   Right Ear: External ear normal.   Left Ear: External ear normal.   Mouth/Throat: Oropharynx is clear and moist.   Eyes: Conjunctivae are normal.   Neck: Normal range of motion. Neck supple. No thyromegaly present.   Cardiovascular: Normal rate, regular rhythm and normal heart sounds.    Pulmonary/Chest: No respiratory distress.   Musculoskeletal: She exhibits no edema.   No flank pain   Lymphadenopathy:     She has no cervical adenopathy.   Neurological: She is alert and oriented to person, place, and time.   Skin: Skin is warm and dry. No rash noted. No erythema.       Assessment:       1. Acute cystitis without hematuria    2. Aortic ectasia: ascending aorta 4.7 cm 2016 (CT); 4.4 2017         Plan:         Acute cystitis without hematuria  -     POCT urinalysis, dipstick or tablet reag  -     Urinalysis  -     Urine culture    Aortic ectasia: ascending aorta 4.7 cm 2016 (CT); 4.4 2017: keep follow up with CTS    I will review all studies and determine further tx depending on findings

## 2018-07-16 ENCOUNTER — TELEPHONE (OUTPATIENT)
Dept: INTERNAL MEDICINE | Facility: CLINIC | Age: 73
End: 2018-07-16

## 2018-07-16 ENCOUNTER — PATIENT MESSAGE (OUTPATIENT)
Dept: INTERNAL MEDICINE | Facility: CLINIC | Age: 73
End: 2018-07-16

## 2018-07-16 LAB — BACTERIA UR CULT: NORMAL

## 2018-07-16 RX ORDER — NITROFURANTOIN 25; 75 MG/1; MG/1
100 CAPSULE ORAL 2 TIMES DAILY
Qty: 10 CAPSULE | Refills: 0 | Status: SHIPPED | OUTPATIENT
Start: 2018-07-16 | End: 2018-07-31

## 2018-07-31 ENCOUNTER — HOSPITAL ENCOUNTER (OUTPATIENT)
Dept: RADIOLOGY | Facility: HOSPITAL | Age: 73
Discharge: HOME | End: 2018-07-31
Attending: THORACIC SURGERY (CARDIOTHORACIC VASCULAR SURGERY)
Payer: MEDICARE

## 2018-07-31 ENCOUNTER — OFFICE VISIT (OUTPATIENT)
Dept: CARDIOTHORACIC SURGERY | Facility: CLINIC | Age: 73
End: 2018-07-31
Payer: MEDICARE

## 2018-07-31 VITALS
OXYGEN SATURATION: 96 % | HEART RATE: 69 BPM | BODY MASS INDEX: 31.28 KG/M2 | DIASTOLIC BLOOD PRESSURE: 73 MMHG | SYSTOLIC BLOOD PRESSURE: 144 MMHG | WEIGHT: 159.31 LBS | TEMPERATURE: 99 F | HEIGHT: 60 IN

## 2018-07-31 DIAGNOSIS — I77.819 AORTIC ECTASIA: ICD-10-CM

## 2018-07-31 DIAGNOSIS — I71.21 ASCENDING AORTIC ANEURYSM: Primary | ICD-10-CM

## 2018-07-31 PROCEDURE — 99999 PR PBB SHADOW E&M-EST. PATIENT-LVL III: CPT | Mod: PBBFAC,,, | Performed by: THORACIC SURGERY (CARDIOTHORACIC VASCULAR SURGERY)

## 2018-07-31 PROCEDURE — 71250 CT THORAX DX C-: CPT | Mod: TC

## 2018-07-31 PROCEDURE — 71250 CT THORAX DX C-: CPT | Mod: 26,,, | Performed by: RADIOLOGY

## 2018-07-31 PROCEDURE — 99215 OFFICE O/P EST HI 40 MIN: CPT | Mod: S$GLB,,, | Performed by: THORACIC SURGERY (CARDIOTHORACIC VASCULAR SURGERY)

## 2018-07-31 NOTE — PROGRESS NOTES
Patient is a 73 y.o. female presents with  PMH of TAA (2009) which was not follow up per patient, HLD and GERD. Pt had CTA of chest from PCP, Dr. Nolasco and TAA was found to be 4.7cm. Pt has no complaints and does not require HTN medications and is a nonsmoker.     Allergies: reviewed  Medications: reviewed  Nursing VS: reviewed  Past medical history and past surgical history reviewed and unchanged from previous H&P in 2017  ROS: unchanged from previous H&P in 2017    PE:  Physical Exam   Constitutional: Patient appears well-developed and well-nourished.   HENT:   Head: Normocephalic and atraumatic.   Eyes: Pupils are equal, round, and reactive to light.   Neck: Normal range of motion. Neck supple.   Cardiovascular: Normal rate, regular rhythm and normal heart sounds.    Pulmonary/Chest: Effort normal and breath sounds normal.   Abdominal: Soft. Bowel sounds are normal.   Musculoskeletal: Normal range of motion.   Neurological: Alert and oriented to person, place, and time.   Skin: Skin is warm and dry.   Psychiatric: Normal mood and affect. Behavior is normal.       Data:  CT scan of the chest:  Stable TAA at 4.7 cm    Assessment:  TAA    Plan:    F/u in 1 year with a non contrast chest CT    CTS Attending Note:    I have personally taken the history and examined this patient and agree with the NP's note as stated above. I reviewed the CT scan.  No significant change since the previous study. Plan repeat CT in 1 year.  She remains roughly 4.7 cm in diameter at the level of the right pulmonary artery.

## 2018-08-14 ENCOUNTER — PATIENT MESSAGE (OUTPATIENT)
Dept: INTERNAL MEDICINE | Facility: CLINIC | Age: 73
End: 2018-08-14

## 2018-08-14 DIAGNOSIS — I77.819 AORTIC ECTASIA: ICD-10-CM

## 2018-08-14 DIAGNOSIS — E78.2 MIXED HYPERLIPIDEMIA: ICD-10-CM

## 2018-08-14 DIAGNOSIS — Z00.00 PHYSICAL EXAM, ANNUAL: Primary | ICD-10-CM

## 2018-08-14 DIAGNOSIS — R53.83 OTHER FATIGUE: ICD-10-CM

## 2018-08-14 DIAGNOSIS — M81.0 AGE-RELATED OSTEOPOROSIS WITHOUT CURRENT PATHOLOGICAL FRACTURE: ICD-10-CM

## 2018-08-14 DIAGNOSIS — R73.01 IFG (IMPAIRED FASTING GLUCOSE): ICD-10-CM

## 2018-08-16 ENCOUNTER — LAB VISIT (OUTPATIENT)
Dept: LAB | Facility: HOSPITAL | Age: 73
End: 2018-08-16
Attending: INTERNAL MEDICINE
Payer: MEDICARE

## 2018-08-16 DIAGNOSIS — R53.83 OTHER FATIGUE: ICD-10-CM

## 2018-08-16 DIAGNOSIS — M81.0 AGE-RELATED OSTEOPOROSIS WITHOUT CURRENT PATHOLOGICAL FRACTURE: ICD-10-CM

## 2018-08-16 DIAGNOSIS — R73.01 IFG (IMPAIRED FASTING GLUCOSE): ICD-10-CM

## 2018-08-16 DIAGNOSIS — E78.2 MIXED HYPERLIPIDEMIA: ICD-10-CM

## 2018-08-16 LAB
25(OH)D3+25(OH)D2 SERPL-MCNC: 35 NG/ML
ALBUMIN SERPL BCP-MCNC: 4.4 G/DL
ALP SERPL-CCNC: 42 U/L
ALT SERPL W/O P-5'-P-CCNC: 22 U/L
ANION GAP SERPL CALC-SCNC: 8 MMOL/L
AST SERPL-CCNC: 22 U/L
BASOPHILS # BLD AUTO: 0.02 K/UL
BASOPHILS NFR BLD: 0.4 %
BILIRUB SERPL-MCNC: 0.5 MG/DL
BUN SERPL-MCNC: 17 MG/DL
CALCIUM SERPL-MCNC: 9.3 MG/DL
CHLORIDE SERPL-SCNC: 103 MMOL/L
CHOLEST SERPL-MCNC: 201 MG/DL
CHOLEST/HDLC SERPL: 3.9 {RATIO}
CO2 SERPL-SCNC: 29 MMOL/L
CREAT SERPL-MCNC: 0.84 MG/DL
DIFFERENTIAL METHOD: NORMAL
EOSINOPHIL # BLD AUTO: 0.1 K/UL
EOSINOPHIL NFR BLD: 2.3 %
ERYTHROCYTE [DISTWIDTH] IN BLOOD BY AUTOMATED COUNT: 13.4 %
EST. GFR  (AFRICAN AMERICAN): >60 ML/MIN/1.73 M^2
EST. GFR  (NON AFRICAN AMERICAN): >60 ML/MIN/1.73 M^2
ESTIMATED AVG GLUCOSE: 103 MG/DL
GLUCOSE SERPL-MCNC: 82 MG/DL
HBA1C MFR BLD HPLC: 5.2 %
HCT VFR BLD AUTO: 38.6 %
HDLC SERPL-MCNC: 52 MG/DL
HDLC SERPL: 25.9 %
HGB BLD-MCNC: 12.8 G/DL
LDLC SERPL CALC-MCNC: 111.4 MG/DL
LYMPHOCYTES # BLD AUTO: 2.4 K/UL
LYMPHOCYTES NFR BLD: 41.9 %
MCH RBC QN AUTO: 30.3 PG
MCHC RBC AUTO-ENTMCNC: 33.2 G/DL
MCV RBC AUTO: 91 FL
MONOCYTES # BLD AUTO: 0.4 K/UL
MONOCYTES NFR BLD: 7.5 %
NEUTROPHILS # BLD AUTO: 2.7 K/UL
NEUTROPHILS NFR BLD: 47.7 %
NONHDLC SERPL-MCNC: 149 MG/DL
PLATELET # BLD AUTO: 188 K/UL
PMV BLD AUTO: 10.6 FL
POTASSIUM SERPL-SCNC: 4.3 MMOL/L
PROT SERPL-MCNC: 7.5 G/DL
RBC # BLD AUTO: 4.23 M/UL
SODIUM SERPL-SCNC: 140 MMOL/L
TRIGL SERPL-MCNC: 188 MG/DL
TSH SERPL DL<=0.005 MIU/L-ACNC: 3.27 UIU/ML
WBC # BLD AUTO: 5.61 K/UL

## 2018-08-16 PROCEDURE — 80053 COMPREHEN METABOLIC PANEL: CPT | Mod: PO

## 2018-08-16 PROCEDURE — 82306 VITAMIN D 25 HYDROXY: CPT | Mod: PO

## 2018-08-16 PROCEDURE — 84443 ASSAY THYROID STIM HORMONE: CPT | Mod: PO

## 2018-08-16 PROCEDURE — 80061 LIPID PANEL: CPT

## 2018-08-16 PROCEDURE — 83036 HEMOGLOBIN GLYCOSYLATED A1C: CPT

## 2018-08-16 PROCEDURE — 36415 COLL VENOUS BLD VENIPUNCTURE: CPT | Mod: PO

## 2018-08-16 PROCEDURE — 85025 COMPLETE CBC W/AUTO DIFF WBC: CPT | Mod: PO

## 2018-08-21 ENCOUNTER — OFFICE VISIT (OUTPATIENT)
Dept: INTERNAL MEDICINE | Facility: CLINIC | Age: 73
End: 2018-08-21
Payer: MEDICARE

## 2018-08-21 VITALS
BODY MASS INDEX: 29.97 KG/M2 | WEIGHT: 158.75 LBS | SYSTOLIC BLOOD PRESSURE: 120 MMHG | HEIGHT: 61 IN | DIASTOLIC BLOOD PRESSURE: 65 MMHG | HEART RATE: 68 BPM

## 2018-08-21 VITALS
BODY MASS INDEX: 29.97 KG/M2 | WEIGHT: 158.75 LBS | HEIGHT: 61 IN | DIASTOLIC BLOOD PRESSURE: 65 MMHG | SYSTOLIC BLOOD PRESSURE: 120 MMHG

## 2018-08-21 DIAGNOSIS — K21.9 GASTROESOPHAGEAL REFLUX DISEASE WITHOUT ESOPHAGITIS: ICD-10-CM

## 2018-08-21 DIAGNOSIS — F51.01 PRIMARY INSOMNIA: ICD-10-CM

## 2018-08-21 DIAGNOSIS — I77.819 AORTIC ECTASIA: ICD-10-CM

## 2018-08-21 DIAGNOSIS — E78.2 MIXED HYPERLIPIDEMIA: ICD-10-CM

## 2018-08-21 DIAGNOSIS — I70.0 AORTIC ATHEROSCLEROSIS: ICD-10-CM

## 2018-08-21 DIAGNOSIS — J98.4 LUNG CYST: ICD-10-CM

## 2018-08-21 DIAGNOSIS — K76.89 LIVER CYST: ICD-10-CM

## 2018-08-21 DIAGNOSIS — Z00.00 ENCOUNTER FOR PREVENTIVE HEALTH EXAMINATION: Primary | ICD-10-CM

## 2018-08-21 DIAGNOSIS — Z00.00 ANNUAL PHYSICAL EXAM: Primary | ICD-10-CM

## 2018-08-21 DIAGNOSIS — K57.50 DIVERTICULOSIS OF BOTH SMALL AND LARGE INTESTINE WITHOUT BLEEDING: ICD-10-CM

## 2018-08-21 DIAGNOSIS — M81.0 AGE-RELATED OSTEOPOROSIS WITHOUT CURRENT PATHOLOGICAL FRACTURE: ICD-10-CM

## 2018-08-21 PROCEDURE — 99499 UNLISTED E&M SERVICE: CPT | Mod: S$GLB,,, | Performed by: INTERNAL MEDICINE

## 2018-08-21 PROCEDURE — 99999 PR PBB SHADOW E&M-EST. PATIENT-LVL IV: CPT | Mod: PBBFAC,,, | Performed by: INTERNAL MEDICINE

## 2018-08-21 PROCEDURE — 99397 PER PM REEVAL EST PAT 65+ YR: CPT | Mod: S$GLB,,, | Performed by: INTERNAL MEDICINE

## 2018-08-21 PROCEDURE — 99999 PR PBB SHADOW E&M-EST. PATIENT-LVL III: CPT | Mod: PBBFAC,,, | Performed by: NURSE PRACTITIONER

## 2018-08-21 PROCEDURE — G0439 PPPS, SUBSEQ VISIT: HCPCS | Mod: S$GLB,,, | Performed by: NURSE PRACTITIONER

## 2018-08-21 RX ORDER — ATORVASTATIN CALCIUM 20 MG/1
20 TABLET, FILM COATED ORAL DAILY
Qty: 90 TABLET | Refills: 3 | Status: SHIPPED | OUTPATIENT
Start: 2018-08-21 | End: 2018-10-03

## 2018-08-21 RX ORDER — LORAZEPAM 0.5 MG/1
0.5 TABLET ORAL
Qty: 30 TABLET | Refills: 0 | Status: SHIPPED | OUTPATIENT
Start: 2018-08-21 | End: 2019-08-22 | Stop reason: SDUPTHER

## 2018-08-21 NOTE — PROGRESS NOTES
I offered to discuss end of life issues, including information on how to make advance directives that the patient could use to name someone who would make medical decisions on their behalf if they became too ill to make themselves.    _X_Patient has advanced directives already in order, encouraged patient to bring in a copy  ___Patient is interested, I provided paper work and offered to discuss.

## 2018-08-21 NOTE — PROGRESS NOTES
"Ross Rodriguez presented for a  Medicare AWV and comprehensive Health Risk Assessment today. The following components were reviewed and updated:    · Medical history  · Family History  · Social history  · Allergies and Current Medications  · Health Risk Assessment  · Health Maintenance  · Care Team     ** See Completed Assessments for Annual Wellness Visit within the encounter summary.**       The following assessments were completed:  · Living Situation  · CAGE  · Depression Screening  · Timed Get Up and Go  · Whisper Test  · Cognitive Function Screening  · Nutrition Screening  · ADL Screening  · PAQ Screening        Vitals:    08/21/18 1110   BP: 120/65   BP Location: Right arm   Patient Position: Sitting   Pulse: 68   Weight: 72 kg (158 lb 11.7 oz)   Height: 5' 1" (1.549 m)     Body mass index is 29.99 kg/m².  Physical Exam   Constitutional: She is oriented to person, place, and time. She appears well-developed and well-nourished. No distress.   HENT:   Head: Normocephalic and atraumatic.   Eyes: No scleral icterus.   Neck: Normal range of motion.   Cardiovascular: Normal rate, regular rhythm, normal heart sounds and intact distal pulses.   Pulmonary/Chest: Effort normal and breath sounds normal. No respiratory distress.   Musculoskeletal: Normal range of motion. She exhibits no edema.   Neurological: She is alert and oriented to person, place, and time.   Skin: Skin is warm and dry. She is not diaphoretic.   Psychiatric: She has a normal mood and affect. Her behavior is normal.   Vitals reviewed.      Diagnoses and health risks identified today and associated recommendations/orders:    1. Encounter for preventive health examination  Assessments completed  Preventative health recommendations reviewed    2. Aortic ectasia: ascending aorta 4.7 cm 2016 (CT); 4.4 2017  Stable.   Bp well controlled  Followed by cardiothoracic surgery and PCP.     3. Aortic atherosclerosis: see CT scan 7/18  Stable.   Started on lipitor " by pcp  Followed by PCP.     4. Lung cyst: R side see CT 2009  Stable.   Followed by PCP.     5. Mixed hyperlipidemia  Stable.   Started on lipitor by pcp  Followed by PCP.     6. Diverticulosis of small and large intestine see CT scan 7/15  Stable.   Currently asymptomatic  Followed by PCP.     7. Gastroesophageal reflux disease without esophagitis  Stable.   Followed by PCP.     8. Liver cyst: see CT 2009  Stable.   Ultrasound ordered by PCP  Followed by PCP.     9. Age-related osteoporosis without current pathological fracture  Stable.   Controlled with prolia  Followed by endocrine.     10. Primary insomnia  Stable.   Controlled with current medical therapy  Followed by PCP.     Provided Joyclyn with a 5-10 year written screening schedule and personal prevention plan. Recommendations were developed using the USPSTF age appropriate recommendations. Education, counseling, and referrals were provided as needed. After Visit Summary printed and given to patient which includes a list of additional screenings\tests needed.    Follow-up in about 1 year (around 8/21/2019) for a routine visit with your primary care provider or sooner if problems arise. .    Gardenia Burton NP

## 2018-08-21 NOTE — PROGRESS NOTES
Subjective:       Patient ID: Ross Rodriguez is a 73 y.o. female.    Chief Complaint: Annual Exam    Overall doing well, planning to do some traveling.    CT has been stable in terms of aneurysm.  However, ASCVD reviewed.  Also has evidence of some lung fibrosis.  She has never smoked.  No syncope, chest pain, pressure, tightness or shortness of breath.    Abdominal ultrasound recommended for follow-up.    She will also be seeing Endocrinology soon with regard to osteoporosis treatment.    Patient Active Problem List:     Gastroesophageal reflux disease without esophagitis     Nuclear sclerosis - Both Eyes     Tinnitus     Diverticulosis of small and large intestine see CT scan 7/15     Mixed hyperlipidemia     Aortic ectasia: ascending aorta 4.7 cm 2016 (CT); 4.4 2017     Lung cyst: R side see CT 2009     Liver cyst: see CT 2009     Primary insomnia     Age-related osteoporosis without current pathological fracture     Aortic atherosclerosis: see CT scan 7/18        Review of Systems   Constitutional: Negative for activity change and unexpected weight change.   HENT: Negative for hearing loss, rhinorrhea and trouble swallowing.    Eyes: Negative for discharge and visual disturbance.   Respiratory: Negative for chest tightness and wheezing.    Cardiovascular: Negative for chest pain and palpitations.   Gastrointestinal: Negative for blood in stool, constipation, diarrhea and vomiting.   Endocrine: Negative for polydipsia and polyuria.   Genitourinary: Negative for difficulty urinating, dysuria, hematuria and menstrual problem.   Musculoskeletal: Positive for arthralgias and joint swelling. Negative for neck pain.   Neurological: Negative for weakness and headaches.   Psychiatric/Behavioral: Negative for confusion and dysphoric mood.       Objective:      Physical Exam   Constitutional: She is oriented to person, place, and time. She appears well-developed and well-nourished.   HENT:   Head: Normocephalic and  atraumatic.   Right Ear: External ear normal.   Left Ear: External ear normal.   Nose: Nose normal.   Mouth/Throat: Oropharynx is clear and moist. No oropharyngeal exudate.   Eyes: Conjunctivae and EOM are normal. No scleral icterus.   Neck: Normal range of motion. Neck supple. No JVD present. No thyromegaly present.   Cardiovascular: Normal rate, regular rhythm, normal heart sounds and intact distal pulses. Exam reveals no gallop.   No murmur heard.  Pulmonary/Chest: Effort normal and breath sounds normal. No respiratory distress. She has no wheezes.   Abdominal: Soft. Bowel sounds are normal. She exhibits no distension and no mass. There is no tenderness. There is no rebound and no guarding.   Musculoskeletal: Normal range of motion. She exhibits no edema or tenderness.   Lymphadenopathy:     She has no cervical adenopathy.   Neurological: She is alert and oriented to person, place, and time. She displays normal reflexes. No cranial nerve deficit. Coordination normal.   Skin: Skin is warm. No rash noted. No erythema.   Psychiatric: She has a normal mood and affect. Her behavior is normal. Judgment and thought content normal.   Nursing note and vitals reviewed.      Assessment:       1. Annual physical exam    2. Aortic ectasia: ascending aorta 4.7 cm 2016 (CT); 4.4 2017; stable 2018   3. Liver cyst: see CT 2009    4. Aortic atherosclerosis: see CT scan 7/18    5. Mixed hyperlipidemia        Plan:     Annual physical exam    Aortic ectasia: ascending aorta 4.7 cm 2016 (CT); 4.4 2017; stable 2018.  Keep cardiothoracic surgery follow-up    Liver cyst: see CT 2009  -     US Abdomen Complete; Future; Expected date: 08/21/2018    Aortic atherosclerosis: see CT scan 7/18:  She is allergic to aspirin.  She will do a trial of statin therapy, cautions and side effects reviewed.    Mixed hyperlipidemia  -     Lipid panel; Future; Expected date: 11/19/2018  -     AST (SGOT); Future; Expected date: 08/24/2018    Other  orders  -     atorvastatin (LIPITOR) 20 MG tablet; Take 1 tablet (20 mg total) by mouth once daily.  Dispense: 90 tablet; Refill: 3.  Cautions and side effects reviewed  -     LORazepam (ATIVAN) 0.5 MG tablet; Take 1 tablet (0.5 mg total) by mouth as needed for Anxiety (May take 1-2 x weekly for anxiety or for travel).  Dispense: 30 tablet; Refill: 0    Labs in 3 months  Flu vaccine when available  Keep follow-up regarding her osteoporosis treatment    Annual follow up with me

## 2018-08-21 NOTE — PATIENT INSTRUCTIONS
Atorvastatin tablets  What is this medicine?  ATORVASTATIN (a TORE va sta tin) is known as a HMG-CoA reductase inhibitor or 'statin'. It lowers the level of cholesterol and triglycerides in the blood. This drug may also reduce the risk of heart attack, stroke, or other health problems in patients with risk factors for heart disease. Diet and lifestyle changes are often used with this drug.  How should I use this medicine?  Take this medicine by mouth with a glass of water. Follow the directions on the prescription label. You can take this medicine with or without food. Take your doses at regular intervals. Do not take your medicine more often than directed.  Talk to your pediatrician regarding the use of this medicine in children. While this drug may be prescribed for children as young as 10 years old for selected conditions, precautions do apply.  What side effects may I notice from receiving this medicine?  Side effects that you should report to your doctor or health care professional as soon as possible:  · allergic reactions like skin rash, itching or hives, swelling of the face, lips, or tongue  · dark urine  · fever  · joint pain  · muscle cramps, pain  · redness, blistering, peeling or loosening of the skin, including inside the mouth  · trouble passing urine or change in the amount of urine  · unusually weak or tired  · yellowing of eyes or skin  Side effects that usually do not require medical attention (report to your doctor or health care professional if they continue or are bothersome):  · constipation  · heartburn  · stomach gas, pain, upset  What may interact with this medicine?  Do not take this medicine with any of the following medications:  · red yeast rice  · telaprevir  · telithromycin  · voriconazole  This medicine may also interact with the following medications:  · alcohol  · antiviral medicines for HIV or AIDS  · boceprevir  · certain antibiotics like clarithromycin, erythromycin,  troleandomycin  · certain medicines for cholesterol like fenofibrate or gemfibrozil  · cimetidine  · clarithromycin  · colchicine  · cyclosporine  · digoxin  · female hormones, like estrogens or progestins and birth control pills  · grapefruit juice  · medicines for fungal infections like fluconazole, itraconazole, ketoconazole  · niacin  · rifampin  · spironolactone  What if I miss a dose?  If you miss a dose, take it as soon as you can. If it is almost time for your next dose, take only that dose. Do not take double or extra doses.  Where should I keep my medicine?  Keep out of the reach of children.  Store at room temperature between 20 to 25 degrees C (68 to 77 degrees F). Throw away any unused medicine after the expiration date.  What should I tell my health care provider before I take this medicine?  They need to know if you have any of these conditions:  · frequently drink alcoholic beverages  · history of stroke, TIA  · kidney disease  · liver disease  · muscle aches or weakness  · other medical condition  · an unusual or allergic reaction to atorvastatin, other medicines, foods, dyes, or preservatives  · pregnant or trying to get pregnant  · breast-feeding  What should I watch for while using this medicine?  Visit your doctor or health care professional for regular check-ups. You may need regular tests to make sure your liver is working properly.  Tell your doctor or health care professional right away if you get any unexplained muscle pain, tenderness, or weakness, especially if you also have a fever and tiredness. Your doctor or health care professional may tell you to stop taking this medicine if you develop muscle problems. If your muscle problems do not go away after stopping this medicine, contact your health care professional.  This drug is only part of a total heart-health program. Your doctor or a dietician can suggest a low-cholesterol and low-fat diet to help. Avoid alcohol and smoking, and keep  a proper exercise schedule.  Do not use this drug if you are pregnant or breast-feeding. Serious side effects to an unborn child or to an infant are possible. Talk to your doctor or pharmacist for more information.  This medicine may affect blood sugar levels. If you have diabetes, check with your doctor or health care professional before you change your diet or the dose of your diabetic medicine.  If you are going to have surgery tell your health care professional that you are taking this drug.  NOTE:This sheet is a summary. It may not cover all possible information. If you have questions about this medicine, talk to your doctor, pharmacist, or health care provider. Copyright© 2017 [employer]

## 2018-08-21 NOTE — PATIENT INSTRUCTIONS
Counseling and Referral of Other Preventative  (Italic type indicates deductible and co-insurance are waived)    Patient Name: Ross Rodriguez  Today's Date: 8/21/2018    Health Maintenance       Date Due Completion Date    Influenza Vaccine 08/01/2018 10/4/2017 (Done)    Override on 10/4/2017: Done    DEXA SCAN 06/28/2019 6/28/2017    Override on 1/24/2012: Done    Mammogram 07/05/2019 7/5/2018    High Dose Statin 08/21/2019 8/21/2018    Lipid Panel 08/16/2023 8/16/2018    Colonoscopy 07/21/2025 7/21/2015    Override on 1/25/2005: Done    TETANUS VACCINE 06/21/2026 6/21/2016        No orders of the defined types were placed in this encounter.    The following information is provided to all patients.  This information is to help you find resources for any of the problems found today that may be affecting your health:                Living healthy guide: www.[facility].[place].[facility]      Understanding Diabetes: www.diabetes.org      Eating healthy: www.cdc.gov/healthyweight      CDC home safety checklist: www.cdc.gov/steadi/patient.html      Agency on Aging: www.goea.[place].[facility]      Alcoholics anonymous (AA): www.aa.org      Physical Activity: www.lonnie.nih.gov/ym4nkoi      Tobacco use: www.quitwithusla.org

## 2018-08-22 ENCOUNTER — PATIENT MESSAGE (OUTPATIENT)
Dept: ENDOCRINOLOGY | Facility: CLINIC | Age: 73
End: 2018-08-22

## 2018-08-27 ENCOUNTER — HOSPITAL ENCOUNTER (OUTPATIENT)
Dept: RADIOLOGY | Facility: HOSPITAL | Age: 73
Discharge: HOME | End: 2018-08-27
Attending: INTERNAL MEDICINE
Payer: MEDICARE

## 2018-08-27 ENCOUNTER — PATIENT MESSAGE (OUTPATIENT)
Dept: INTERNAL MEDICINE | Facility: CLINIC | Age: 73
End: 2018-08-27

## 2018-08-27 DIAGNOSIS — K76.89 LIVER CYST: ICD-10-CM

## 2018-08-27 PROCEDURE — 76700 US EXAM ABDOM COMPLETE: CPT | Mod: TC,PO

## 2018-10-03 ENCOUNTER — PATIENT MESSAGE (OUTPATIENT)
Dept: INTERNAL MEDICINE | Facility: CLINIC | Age: 73
End: 2018-10-03

## 2018-10-03 RX ORDER — ROSUVASTATIN CALCIUM 10 MG/1
10 TABLET, COATED ORAL DAILY
Qty: 90 TABLET | Refills: 3 | Status: SHIPPED | OUTPATIENT
Start: 2018-10-03 | End: 2019-08-22 | Stop reason: SDUPTHER

## 2018-10-22 ENCOUNTER — OFFICE VISIT (OUTPATIENT)
Dept: ENDOCRINOLOGY | Facility: CLINIC | Age: 73
End: 2018-10-22
Payer: MEDICARE

## 2018-10-22 VITALS
SYSTOLIC BLOOD PRESSURE: 110 MMHG | RESPIRATION RATE: 16 BRPM | WEIGHT: 159.63 LBS | BODY MASS INDEX: 30.14 KG/M2 | HEIGHT: 61 IN | DIASTOLIC BLOOD PRESSURE: 60 MMHG

## 2018-10-22 DIAGNOSIS — M81.0 AGE-RELATED OSTEOPOROSIS WITHOUT CURRENT PATHOLOGICAL FRACTURE: Primary | ICD-10-CM

## 2018-10-22 PROCEDURE — 99214 OFFICE O/P EST MOD 30 MIN: CPT | Mod: S$PBB,,, | Performed by: INTERNAL MEDICINE

## 2018-10-22 PROCEDURE — 99213 OFFICE O/P EST LOW 20 MIN: CPT | Mod: PBBFAC | Performed by: INTERNAL MEDICINE

## 2018-10-22 PROCEDURE — 99999 PR PBB SHADOW E&M-EST. PATIENT-LVL III: CPT | Mod: PBBFAC,,, | Performed by: INTERNAL MEDICINE

## 2018-10-22 PROCEDURE — 1101F PT FALLS ASSESS-DOCD LE1/YR: CPT | Mod: CPTII,,, | Performed by: INTERNAL MEDICINE

## 2018-10-22 NOTE — PROGRESS NOTES
Chief Complaint: No chief complaint on file.      HPI:  Ross Rodriguez is 73 y.o. female here today for Osteoporosis follow up   Last office visit in 2017     Diagnosed with Osteopenia based on BMD in   Repeat study in 2017 noted Osteoporosis of the lumbar spine with a T score -2.5    The patient reports taking Fosamax in the past but states she cannot tolerate due to GI upset     The patient opted to Prolia after our last visit  She has received injections on 2017 and 2018   Next dose due in 2018     The patient states she developed some muscle pain after the prolia injection  Pain greater with 2nd injection     She has + family history of Osteoporosis - father with compression fractures in his 90's     Patient's Fracture History:   left foot . Patient states someone dropped a piece of furniture on her foot     Calcium and Vitamin D status: taking calcium and vitamin D3 daily,  cannot recall exact dosage     Steroid Use: denies     Weight bearing exercises: 3x weekly     Medications associated with bone loss: occasional use of Nexium for GERD     Pt reports denies h/o hypercalcemia, hyperparathyroidism or hyperthyroidism  She denies  h/o kidney stones  Denies  h/o diarrhea, malabsorption     She denies loss of height   Last dental exam: every 4 months, next exam scheduled for 2018   Denies jaw pain     Results for orders placed during the hospital encounter of 17   DXA Bone Density Spine And Hip    Narrative : 1945 ORDERING PHYSICIAN: WILIAN Nolasco LOCATION: [facility]    HISTORY: 73 y/o female with a hx of several fractures. She had menopausal symptoms at 43 y/o. Pts Father had a back fx. Pt is taking Calcium and Vit D supplements. She exercises 3 times a week and does not smoke.    TECHNIQUE: Bone Mineral Density performed using Hologic Horizon A (S/N [id number]) reveals good positioning of lumbar spine and hip.    BONE MINERAL DENSITY RESULTS:  Lumbar Spine: Lumbar bone  mineral density L1-L4 is 0.775g/cm2, which is a t-score of -2.5. The z-score is -0.2.    Total Hip: The total hip bone mineral density is 0.772g/cm2.  The t-score is -1.4, and the z-score is 0.3.  Femoral neck BMD is 0.638g/cm2 and the t-score is -1.9.    COMPARISONS:  Date   Location  BMD  T-score  03/26/15  L-spine  0.779  -2.4     Total Hip  0.766  -1.4    Impression Osteoporosis of the lumbar spine. Total hip and lumbar spine BMD unchanged since 2015.    Recommendations:  1) Adequate calcium and Vitamin D therapy  2) Appropriate exercise  3) Consider medical therapy including bisphosphonates, denosumab.  4) Consider repeat BMD in 2 years      EXPLANATION OF RESULTS:  The t-score compares this results to the bone density of a 25 year old of the same gender. The z-score compares this result to the average bone density to people of the same age and gender. The amounts indicate the number of standard deviations above or below the mean.  * Osteoporosis is generally defined as having a t-score between less than -2.5.  * Osteopenia is generally defined as having a t-score between -1 and -2.5.  * The normal range is generally defined as having a t-score between -1 to 1.      Electronically signed by: ADI BALL MD  Date:     06/30/17  Time:    15:58      Review of Systems   Constitutional: Negative for fatigue and unexpected weight change.   Respiratory: Positive for shortness of breath (with exertion ).    Cardiovascular: Negative for chest pain and palpitations.   Gastrointestinal: Negative for abdominal pain, constipation, nausea and vomiting.   Musculoskeletal: Positive for back pain.   Skin: Negative for rash.   Neurological: Negative for dizziness and headaches.   Hematological: Does not bruise/bleed easily.     Physical Exam   Constitutional: She is oriented to person, place, and time. She appears well-developed. No distress.   Neck: No thyromegaly present.   Cardiovascular: Normal rate and regular rhythm.    Pulmonary/Chest: Effort normal.   Musculoskeletal: She exhibits no edema.   Neurological: She is alert and oriented to person, place, and time. She displays normal reflexes.   Skin: Skin is warm and dry.   Psychiatric: She has a normal mood and affect.   Nursing note and vitals reviewed.    Labs:  Lab Results   Component Value Date     08/16/2018    K 4.3 08/16/2018     08/16/2018    CO2 29 08/16/2018    GLU 82 08/16/2018    BUN 17 08/16/2018    CREATININE 0.84 08/16/2018    CALCIUM 9.3 08/16/2018    PROT 7.5 08/16/2018    ALBUMIN 4.4 08/16/2018    BILITOT 0.5 08/16/2018    ALKPHOS 42 08/16/2018    AST 22 08/16/2018    ALT 22 08/16/2018    ANIONGAP 8 08/16/2018    ESTGFRAFRICA >60.0 08/16/2018    EGFRNONAA >60.0 08/16/2018     Lab Results   Component Value Date    HKGWBVRH79HH 35 08/16/2018     Results for LALITA ESPINOZA (MRN [id number]) as of 10/22/2018 13:23   Ref. Range 8/16/2018 07:13   TSH Latest Ref Range: 0.400 - 4.000 uIU/mL 3.270     Results for LALITA ESPINOZA (MRN [id number]) as of 10/22/2018 13:23   Ref. Range 8/16/2018 07:13   Hemoglobin A1C Latest Ref Range: 4.0 - 5.6 % 5.2   Estimated Avg Glucose Latest Ref Range: 68 - 131 mg/dL 103     Assessment and Plan:  1. Age-related osteoporosis without current pathological fracture  -- risks include: family history, PPI use   -- reassuring she is not fracturing   -- RDA of calcium and vitamin D, calcium from food sources are preferred   -- continue weight bearing exercises as tolerated   -- encouraged fall safety and fall precautions   -- continue Prolia   -- discussed low risk of ONJ and atypical femur fracture   -- pt advised to report new/ unusual hip, groin or thigh pain   -- recommend dental exam every 6 months   -- alerted that if dental work needs to be done it should be done prior to continuing therapy with Prolia  -- recommend repeat BMD in 06/2019

## 2018-10-22 NOTE — PATIENT INSTRUCTIONS
Infusion center: [facility] -  Nurse   [phone]   Call to see if she can see patient earlier than scheduled

## 2018-11-19 ENCOUNTER — LAB VISIT (OUTPATIENT)
Dept: LAB | Facility: HOSPITAL | Age: 73
End: 2018-11-19
Attending: INTERNAL MEDICINE
Payer: MEDICARE

## 2018-11-19 ENCOUNTER — PATIENT MESSAGE (OUTPATIENT)
Dept: INTERNAL MEDICINE | Facility: CLINIC | Age: 73
End: 2018-11-19

## 2018-11-19 ENCOUNTER — INFUSION (OUTPATIENT)
Dept: INFECTIOUS DISEASES | Facility: HOSPITAL | Age: 73
End: 2018-11-19
Attending: INTERNAL MEDICINE
Payer: MEDICARE

## 2018-11-19 VITALS — WEIGHT: 159.63 LBS | BODY MASS INDEX: 30.14 KG/M2 | HEIGHT: 61 IN

## 2018-11-19 DIAGNOSIS — E78.2 MIXED HYPERLIPIDEMIA: ICD-10-CM

## 2018-11-19 DIAGNOSIS — M81.0 AGE-RELATED OSTEOPOROSIS WITHOUT CURRENT PATHOLOGICAL FRACTURE: Primary | ICD-10-CM

## 2018-11-19 LAB
AST SERPL-CCNC: 25 U/L
CHOLEST SERPL-MCNC: 134 MG/DL
CHOLEST/HDLC SERPL: 2.7 {RATIO}
HDLC SERPL-MCNC: 49 MG/DL
HDLC SERPL: 36.6 %
LDLC SERPL CALC-MCNC: 46 MG/DL
NONHDLC SERPL-MCNC: 85 MG/DL
TRIGL SERPL-MCNC: 195 MG/DL

## 2018-11-19 PROCEDURE — 36415 COLL VENOUS BLD VENIPUNCTURE: CPT | Mod: HCNC,PO

## 2018-11-19 PROCEDURE — 96372 THER/PROPH/DIAG INJ SC/IM: CPT | Mod: HCNC

## 2018-11-19 PROCEDURE — 80061 LIPID PANEL: CPT | Mod: HCNC

## 2018-11-19 PROCEDURE — 63600175 PHARM REV CODE 636 W HCPCS: Mod: JG,HCNC | Performed by: INTERNAL MEDICINE

## 2018-11-19 PROCEDURE — 84450 TRANSFERASE (AST) (SGOT): CPT | Mod: HCNC,PO

## 2018-11-19 RX ADMIN — DENOSUMAB 60 MG: 60 INJECTION SUBCUTANEOUS at 09:11

## 2019-02-25 ENCOUNTER — OFFICE VISIT (OUTPATIENT)
Dept: OPTOMETRY | Facility: CLINIC | Age: 74
End: 2019-02-25
Payer: COMMERCIAL

## 2019-02-25 DIAGNOSIS — H04.123 BILATERAL DRY EYES: ICD-10-CM

## 2019-02-25 DIAGNOSIS — H52.4 BILATERAL PRESBYOPIA: ICD-10-CM

## 2019-02-25 DIAGNOSIS — H25.13 NUCLEAR SCLEROSIS, BILATERAL: Primary | ICD-10-CM

## 2019-02-25 PROCEDURE — 99999 PR PBB SHADOW E&M-EST. PATIENT-LVL II: CPT | Mod: PBBFAC,,, | Performed by: OPTOMETRIST

## 2019-02-25 PROCEDURE — 92014 COMPRE OPH EXAM EST PT 1/>: CPT | Mod: S$GLB,,, | Performed by: OPTOMETRIST

## 2019-02-25 PROCEDURE — 92014 PR EYE EXAM, EST PATIENT,COMPREHESV: ICD-10-PCS | Mod: S$GLB,,, | Performed by: OPTOMETRIST

## 2019-02-25 PROCEDURE — 99999 PR PBB SHADOW E&M-EST. PATIENT-LVL II: ICD-10-PCS | Mod: PBBFAC,,, | Performed by: OPTOMETRIST

## 2019-02-25 NOTE — MEDICAL/APP STUDENT
Pt is here for a routine eye exam. Pt reports occasional dry eyes with computer use. Uses OTC ATs (unknown brand) for relief. Glasses ~2 years old (PAL), wears mainly for reading. No complaints.

## 2019-02-25 NOTE — PROGRESS NOTES
HPI     Pt is here for a routine eye exam. Pt reports occasional dry eyes with   computer use. Uses OTC ATs (unknown brand) for relief. Glasses ~2 years   old (PAL), wears mainly for reading. No complaints    Last edited by Quan Lozano, OD on 2/25/2019  1:33 PM. (History)            Assessment /Plan     For exam results, see Encounter Report.    Nuclear sclerosis, bilateral    Bilateral dry eyes    Bilateral presbyopia      1. Educated pt on presence of cataracts and effects on vision. No surgery at this time. Recheck in one year.  2. Recommend artificial tears. 1 drop 3-4x per day. Chronicity of disease and treatment discussed.  3. Spec Rx given. Different lens options discussed with patient. RTC 1 year full exam.

## 2019-04-09 ENCOUNTER — PATIENT MESSAGE (OUTPATIENT)
Dept: CARDIOTHORACIC SURGERY | Facility: CLINIC | Age: 74
End: 2019-04-09

## 2019-04-09 DIAGNOSIS — Z86.79 HISTORY OF THORACOABDOMINAL AORTIC ANEURYSM (TAAA): Primary | ICD-10-CM

## 2019-05-01 ENCOUNTER — PATIENT MESSAGE (OUTPATIENT)
Dept: INTERNAL MEDICINE | Facility: CLINIC | Age: 74
End: 2019-05-01

## 2019-05-01 DIAGNOSIS — E55.9 VITAMIN D DEFICIENCY DISEASE: ICD-10-CM

## 2019-05-01 DIAGNOSIS — M81.0 OSTEOPOROSIS WITHOUT CURRENT PATHOLOGICAL FRACTURE, UNSPECIFIED OSTEOPOROSIS TYPE: ICD-10-CM

## 2019-05-01 DIAGNOSIS — E78.2 MIXED HYPERLIPIDEMIA: Primary | ICD-10-CM

## 2019-05-01 DIAGNOSIS — Z12.31 SCREENING MAMMOGRAM, ENCOUNTER FOR: ICD-10-CM

## 2019-05-01 RX ORDER — NITROFURANTOIN 25; 75 MG/1; MG/1
100 CAPSULE ORAL 2 TIMES DAILY
Qty: 10 CAPSULE | Refills: 0 | Status: SHIPPED | OUTPATIENT
Start: 2019-05-01 | End: 2019-08-22

## 2019-05-01 NOTE — TELEPHONE ENCOUNTER
Please let her know that mammogram is ordered as is bone density.  I would like for her to have those done before I see her in August.  Labs are also ordered which she can do a week before her appointment with me.  Thank you

## 2019-05-21 ENCOUNTER — INFUSION (OUTPATIENT)
Dept: INFECTIOUS DISEASES | Facility: HOSPITAL | Age: 74
End: 2019-05-21
Attending: INTERNAL MEDICINE
Payer: MEDICARE

## 2019-05-21 VITALS — HEIGHT: 61 IN | BODY MASS INDEX: 30.14 KG/M2 | WEIGHT: 159.63 LBS

## 2019-05-21 DIAGNOSIS — M81.0 AGE-RELATED OSTEOPOROSIS WITHOUT CURRENT PATHOLOGICAL FRACTURE: Primary | ICD-10-CM

## 2019-05-21 PROCEDURE — 96372 THER/PROPH/DIAG INJ SC/IM: CPT | Mod: HCNC

## 2019-05-21 PROCEDURE — 63600175 PHARM REV CODE 636 W HCPCS: Mod: HCNC,JG | Performed by: INTERNAL MEDICINE

## 2019-05-21 RX ADMIN — DENOSUMAB 60 MG: 60 INJECTION SUBCUTANEOUS at 09:05

## 2019-07-29 ENCOUNTER — PATIENT MESSAGE (OUTPATIENT)
Dept: INTERNAL MEDICINE | Facility: CLINIC | Age: 74
End: 2019-07-29

## 2019-07-29 ENCOUNTER — OFFICE VISIT (OUTPATIENT)
Dept: INTERNAL MEDICINE | Facility: CLINIC | Age: 74
End: 2019-07-29
Payer: MEDICARE

## 2019-07-29 VITALS
HEIGHT: 61 IN | OXYGEN SATURATION: 96 % | DIASTOLIC BLOOD PRESSURE: 64 MMHG | SYSTOLIC BLOOD PRESSURE: 130 MMHG | HEART RATE: 65 BPM | BODY MASS INDEX: 30.39 KG/M2 | WEIGHT: 160.94 LBS

## 2019-07-29 DIAGNOSIS — M54.50 ACUTE BILATERAL LOW BACK PAIN WITHOUT SCIATICA: ICD-10-CM

## 2019-07-29 PROCEDURE — 99999 PR PBB SHADOW E&M-EST. PATIENT-LVL IV: CPT | Mod: PBBFAC,HCNC,GC, | Performed by: STUDENT IN AN ORGANIZED HEALTH CARE EDUCATION/TRAINING PROGRAM

## 2019-07-29 PROCEDURE — 99213 PR OFFICE/OUTPT VISIT, EST, LEVL III, 20-29 MIN: ICD-10-PCS | Mod: HCNC,GC,S$GLB, | Performed by: STUDENT IN AN ORGANIZED HEALTH CARE EDUCATION/TRAINING PROGRAM

## 2019-07-29 PROCEDURE — 99999 PR PBB SHADOW E&M-EST. PATIENT-LVL IV: ICD-10-PCS | Mod: PBBFAC,HCNC,GC, | Performed by: STUDENT IN AN ORGANIZED HEALTH CARE EDUCATION/TRAINING PROGRAM

## 2019-07-29 PROCEDURE — 99213 OFFICE O/P EST LOW 20 MIN: CPT | Mod: HCNC,GC,S$GLB, | Performed by: STUDENT IN AN ORGANIZED HEALTH CARE EDUCATION/TRAINING PROGRAM

## 2019-07-29 NOTE — PATIENT INSTRUCTIONS
Back Care Tips    Caring for your back  These are things you can do to prevent a recurrence of acute back pain and to reduce symptoms from chronic back pain:  · Maintain a healthy weight. If you are overweight, losing weight will help most types of back pain.  · Exercise is an important part of recovery from most types of back pain. The muscles behind and in front of the spine support the back. This means strengthening both the back muscles and the abdominal muscles will provide better support for your spine.   · Swimming and brisk walking are good overall exercises to improve your fitness level.  · Practice safe lifting methods (below).  · Practice good posture when sitting, standing and walking. Avoid prolonged sitting. This puts more stress on the lower back than standing or walking.  · Wear quality shoes with sufficient arch support. Foot and ankle alignment can affect back symptoms. Women should avoid wearing high heels.  · Therapeutic massage can help relax the back muscles without stretching them.  · During the first 24 to 72 hours after an acute injury or flare-up of chronic back pain, apply an ice pack to the painful area for 20 minutes and then remove it for 20 minutes, over a period of 60 to 90 minutes, or several times a day. As a safety precaution, do not use a heating pad at bedtime. Sleeping on a heating pad can lead to skin burns or tissue damage.  · You can alternate ice and heat therapies.  Medications  Talk to your healthcare provider before using medicines, especially if you have other medical problems or are taking other medicines.  · You may use acetaminophen or ibuprofen to control pain, unless your healthcare provider prescribed other pain medicine. If you have chronic conditions like diabetes, liver or kidney disease, stomach ulcers, or gastrointestinal bleeding, or are taking blood thinners, talk with your healthcare provider before taking any medicines.  · Be careful if you are given  prescription pain medicines, narcotics, or medicine for muscle spasm. They can cause drowsiness, affect your coordination, reflexes, and judgment. Do not drive or operate heavy machinery while taking these types of medicines. Take prescription pain medicine only as prescribed by your healthcare provider.  Lumbar stretch  Here is a simple stretching exercise that will help relax muscle spasm and keep your back more limber. If exercise makes your back pain worse, dont do it.  · Lie on your back with your knees bent and both feet on the ground.  · Slowly raise your left knee to your chest as you flatten your lower back against the floor. Hold for 5 seconds.  · Relax and repeat the exercise with your right knee.  · Do 10 of these exercises for each leg.  Safe lifting method  · Dont bend over at the waist to lift an object off the floor.  Instead, bend your knees and hips in a squat.   · Keep your back and head upright  · Hold the object close to your body, directly in front of you.  · Straighten your legs to lift the object.   · Lower the object to the floor in the reverse fashion.  · If you must slide something across the floor, push it.  Posture tips  Sitting  Sit in chairs with straight backs or low-back support. Keep your knees lower than your hips, with your feet flat on the floor.  When driving, sit up straight. Adjust the seat forward so you are not leaning toward the steering wheel.  A small pillow or rolled towel behind your lower back may help if you are driving long distances.   Standing  When standing for long periods, shift most of your weight to one leg at a time. Alternate legs every few minutes.   Sleeping  The best way to sleep is on your side with your knees bent. Put a low pillow under your head to support your neck in a neutral spine position. Avoid thick pillows that bend your neck to one side. Put a pillow between your legs to further relax your lower back. If you sleep on your back, put pillows  under your knees to support your legs in a slightly flexed position. Use a firm mattress. If your mattress sags, replace it, or use a 1/2-inch plywood board under the mattress to add support.  Follow-up care  Follow up with your healthcare provider, or as advised.  If X-rays, a CT scan or an MRI scan were taken, they will be reviewed by a radiologist. You will be notified of any new findings that may affect your care.  Call 911  Seek emergency medical care if any of the following occur:  · Trouble breathing  · Confusion  · Very drowsy  · Fainting or loss of consciousness  · Rapid or very slow heart rate  · Loss of  bowel or bladder control  When to seek medical care  Call your healthcare provider if any of the following occur:  · Pain becomes worse or spreads to your arms or legs  · Weakness or numbness in one or both arms or legs  · Numbness in the groin area  Date Last Reviewed: 6/1/2016  © 0547-0714 [employer]. [address] [place] [address]. All rights reserved. This information is not intended as a substitute for professional medical care. Always follow your healthcare professional's instructions.        Self-Care for Low Back Pain    Most people have low back pain now and then. In many cases, it isnt serious and self-care can help. Sometimes low back pain can be a sign of a bigger problem. Call your healthcare provider if your pain returns often or gets worse over time. For the long-term care of your back, get regular exercise, lose any excess weight and learn good posture.  Take a short rest  Lying down during the day may be beneficial for short periods of time if severe pain increases with sitting or standing. Long-term bed rest could be detrimental.  Reduce pain and swelling  Cold reduces swelling. Both cold and heat can reduce pain. Protect your skin by placing a towel between your body and the ice or heat source.  · For the first few days, apply an ice pack for 15 to 20 minutes  .  · After the first few days, try heat for 15 minutes at a time to ease pain. Never sleep on a heating pad.  · Over-the-counter medicine can help control pain and swelling. Try aspirin or ibuprofen.  Exercise  Exercise can help your back heal. It also helps your back get stronger and more flexible, preventing any reinjury. Ask your healthcare provider about specific exercises for your back.  Use good posture to avoid reinjury  · When moving, bend at the hips and knees. Dont bend at the waist or twist around.  · When lifting, keep the object close to your body. Dont try to lift more than you can handle.  · When sitting, keep your lower back supported. Use a rolled-up towel as needed.  Seek immediate medical care if:  · Youre unable to stand or walk.  · You have a temperature over 100.4°F (38.0°C)  · You have frequent, painful, or bloody urination.  · You have severe abdominal pain.  · You have a sharp, stabbing pain.  · Your pain is constant.  · You have pain or numbness in your leg.  · You feel pain in a new area of your back.  · You notice that the pain isnt decreasing after more than a week.   Date Last Reviewed: 9/29/2015  © 8656-7286 [employer] [place]. [address] [place] [address], [place] [address]. All rights reserved. This information is not intended as a substitute for professional medical care. Always follow your healthcare professional's instructions.

## 2019-07-29 NOTE — PROGRESS NOTES
Internal Medicine Clinic Note  7/29/2019      Subjective:       Patient ID:  Ross is a 74 y.o. female being seen for an established visit.    Chief Complaint: Back Pain    Ms. Rodriguez is a 74 year old woman with HLD and osteoporosis who presents with lower back pain. She follows with Dr. Nolasco as her PCP and is here for urgent care visit. She says that last Sunday (about a week ago), she was getting up from her chair and turned to the left and felt sharp, low back pain. The pain radiated to her sides bilaterally. The pain has not gone away since that time. She says it is not like her sciatica, which is a dull, deep pain. She states that laying down helps as well as lidocaine cream, Tylenol, ice packs. Exercise classes and stretches along with heat packs didn't help with the pain. She denies leg numbness, tingling, fevers, bladder/bowel incontienece. She is still able to walk. She had a DEXA scan about 2 years ago that was consistent with osteoporosis, which she is taking Prolia. She states that she is due for another DEXA scan in a month.       Review of Systems   Constitutional: Negative for chills and fever.   HENT: Negative for congestion and sore throat.    Eyes: Negative for photophobia and pain.   Respiratory: Negative for cough and shortness of breath.    Cardiovascular: Negative for chest pain and leg swelling.   Gastrointestinal: Negative for abdominal pain and nausea.   Genitourinary: Negative for dysuria and flank pain.   Musculoskeletal: Positive for back pain. Negative for falls, joint pain and myalgias.   Neurological: Negative for dizziness, tingling, sensory change, weakness and headaches.   Psychiatric/Behavioral: Negative for depression. The patient is not nervous/anxious.        Past Medical History:   Diagnosis Date    Allergy     Anxiety     Aortic atherosclerosis: see CT scan 7/18 8/21/2018    Arthritis     Cataract     Depression     Diverticulosis 7/21/2015    GERD (gastroesophageal  reflux disease)     Hyperlipidemia     Joint pain     Liver cyst: see CT 2009 6/21/2016    Lower back pain 7/29/2019    Lung cyst: R side see CT 2009 6/21/2016    Nuclear sclerosis - Both Eyes 2/14/2014    Osteoporosis     Primary insomnia 6/21/2016    Primary insomnia 6/21/2016       Family History   Problem Relation Age of Onset    Stroke Mother     Heart disease Mother         rheumatic fever    Cancer Father         Bladder    COPD Father     Heart disease Father         CHF    Kidney disease Father     Diabetes Father     Hearing loss Father     Thrombosis Maternal Grandfather     Cancer Paternal Grandmother         stomach    [place]'s disease Paternal Grandfather     No Known Problems Son     Skin cancer Paternal Aunt     Melanoma Neg Hx     Amblyopia Neg Hx     Blindness Neg Hx     Cataracts Neg Hx     Glaucoma Neg Hx     Hypertension Neg Hx     Macular degeneration Neg Hx     Retinal detachment Neg Hx     Strabismus Neg Hx     Thyroid disease Neg Hx        Medication List with Changes/Refills   Current Medications    ACETAMINOPHEN (TYLENOL) 500 MG CAP        CALCIUM CITRATE-VITAMIN D2 1,500-200 MG-UNIT TAB        DENOSUMAB (PROLIA) 60 MG/ML SYRG    Inject 60 mg into the skin every 6 (six) months.    FISH OIL-OMEGA-3 FATTY ACIDS 300-1,000 MG CAPSULE    Take 2 g by mouth once daily. Takes 2 times daily    FLAXSEED ORAL    Take 1 application by mouth once daily at 6am. Add seeds to oatmeal    LORAZEPAM (ATIVAN) 0.5 MG TABLET    Take 1 tablet (0.5 mg total) by mouth as needed for Anxiety (May take 1-2 x weekly for anxiety or for travel).    MULTIVITAMIN (THERAGRAN) PER TABLET    Take 1 tablet by mouth once daily.    NITROFURANTOIN, MACROCRYSTAL-MONOHYDRATE, (MACROBID) 100 MG CAPSULE    Take 1 capsule (100 mg total) by mouth 2 (two) times daily.    ROSUVASTATIN (CRESTOR) 10 MG TABLET    Take 1 tablet (10 mg total) by mouth once daily.     Review of patient's allergies  "indicates:   Allergen Reactions    Fosamax [alendronate] Nausea Only    Nsaids (non-steroidal anti-inflammatory drug) Hives    Sulfa (sulfonamide antibiotics) Hives       Patient Active Problem List   Diagnosis    Gastroesophageal reflux disease without esophagitis    Nuclear sclerosis - Both Eyes    Tinnitus    Diverticulosis of small and large intestine see CT scan 7/15    Mixed hyperlipidemia    Aortic ectasia: ascending aorta 4.7 cm 2016 (CT); 4.4 2017    Lung cyst: R side see CT 2009    Liver cyst: see CT 2009    Primary insomnia    Age-related osteoporosis without current pathological fracture    Aortic atherosclerosis: see CT scan 7/18    Lower back pain           Objective:      /64 (BP Location: Left arm)   Pulse 65   Ht 5' 1" (1.549 m)   Wt 73 kg (160 lb 15 oz)   SpO2 96%   BMI 30.41 kg/m²   Estimated body mass index is 30.41 kg/m² as calculated from the following:    Height as of this encounter: 5' 1" (1.549 m).    Weight as of this encounter: 73 kg (160 lb 15 oz).  Physical Exam   Constitutional: She is oriented to person, place, and time and well-developed, well-nourished, and in no distress. No distress.   HENT:   Head: Normocephalic and atraumatic.   Eyes: EOM are normal.   Neck: Normal range of motion. Neck supple.   Cardiovascular: Normal rate and regular rhythm.   No murmur heard.  Pulmonary/Chest: Effort normal and breath sounds normal. No respiratory distress.   Abdominal: Soft. Bowel sounds are normal. She exhibits no distension. There is no tenderness.   Musculoskeletal: She exhibits tenderness (No spinal bony tenderness, tenderness on palpation of paraspinal muscles). She exhibits no edema.   ROM limited due to pain; back flexion about 75 degrees with back extension about 10 degrees   Neurological: She is alert and oriented to person, place, and time. Coordination normal.   Skin: Skin is warm and dry.   Psychiatric: Affect and judgment normal.         Assessment and " Plan:         Ross was seen today for back pain.    Diagnoses and all orders for this visit:    Acute bilateral low back pain without sciatica     - Acute lower back pain likely musculoskeletal in origin from history and exam, which was discussed with patient and that it may take some time for strain to recover.    - Offered outpatient physical therapy, but patient declined saying that she has tried PT before and would like to continue her exercise classes.   - Also discussed with patient about TENS unit, which patient is interested in and says that she will buy one and try it.    - Encouraged patient to keep taking lidocaine cream, Tylenol, ice packs as needed as they all help with the pain   - Encouraged patient to return if back pain worsens or do not improve within next week or so. Discussed possible use of muscle relaxants but defer this time as patient does not like the side effects of drowsiness, confusion.    Follow up if symptoms worsen or fail to improve.    Other Orders Placed This Visit:  No orders of the defined types were placed in this encounter.        Patient seen and discussed with Dr. Mendoza.        Janie Busch MD PGY2  [facility]  Internal Medicine

## 2019-07-31 NOTE — PROGRESS NOTES
I have reviewed the notes, assessments, and/or procedures performed by Dr. Busch, I concur with her/his documentation of Ross Rodriguez.

## 2019-08-12 NOTE — PROGRESS NOTES
Subjective:      Patient ID: Ross Rodriguez is a 74 y.o. female.    Chief Complaint: No chief complaint on file.      HPI:  Ross Rodriguez is a 74 y.o. female who presents for 1 year follow up TAA. PMH of TAA (2009) which was not followed up per patient, HLD and GERD. Pt had CTA of chest from PCP, Dr. Nolasco and TAA was found to be 4.7cm in 2018. Pt has no complaints and does not require HTN medications and is a nonsmoker.     Current medications Reviewed    Review of Systems   Constitutional: Negative for activity change and fatigue.   HENT: Negative for nosebleeds.    Eyes: Negative for visual disturbance.   Respiratory: Negative for cough and shortness of breath.    Cardiovascular: Negative for chest pain, palpitations and leg swelling.   Gastrointestinal: Negative for nausea and vomiting.   Musculoskeletal: Negative for gait problem.   Skin: Negative for color change and pallor.   Neurological: Negative for dizziness, seizures, syncope, weakness, numbness and headaches.   Hematological: Does not bruise/bleed easily.   Psychiatric/Behavioral: Negative for sleep disturbance.     Objective:   Physical Exam   Constitutional: She is oriented to person, place, and time. She appears well-developed and well-nourished. No distress.   HENT:   Head: Normocephalic and atraumatic.   Eyes: Pupils are equal, round, and reactive to light. EOM are normal.   Neck: Normal range of motion.   Cardiovascular: Normal rate, regular rhythm and normal pulses.   Pulmonary/Chest: Effort normal and breath sounds normal. No respiratory distress.   Abdominal: Soft.   Musculoskeletal: Normal range of motion. She exhibits no edema.   Neurological: She is alert and oriented to person, place, and time.   Skin: Skin is warm, dry and intact. Capillary refill takes less than 2 seconds. She is not diaphoretic. No erythema. No pallor.   Psychiatric: She has a normal mood and affect. Her speech is normal and behavior is normal. Judgment and thought  content normal.   Vitals reviewed.      Diagnotic Results:  CT reviewed   Assessment:   1. TAA ~4.7-4.8cm  Plan:   RTC in 1 year with non-contrast CT chest

## 2019-08-13 ENCOUNTER — HOSPITAL ENCOUNTER (OUTPATIENT)
Dept: RADIOLOGY | Facility: HOSPITAL | Age: 74
Discharge: HOME | End: 2019-08-13
Attending: INTERNAL MEDICINE
Payer: MEDICARE

## 2019-08-13 ENCOUNTER — HOSPITAL ENCOUNTER (OUTPATIENT)
Dept: RADIOLOGY | Facility: CLINIC | Age: 74
Discharge: HOME | End: 2019-08-13
Attending: INTERNAL MEDICINE
Payer: MEDICARE

## 2019-08-13 ENCOUNTER — HOSPITAL ENCOUNTER (OUTPATIENT)
Dept: RADIOLOGY | Facility: HOSPITAL | Age: 74
Discharge: HOME | End: 2019-08-13
Attending: THORACIC SURGERY (CARDIOTHORACIC VASCULAR SURGERY)
Payer: MEDICARE

## 2019-08-13 ENCOUNTER — OFFICE VISIT (OUTPATIENT)
Dept: CARDIOTHORACIC SURGERY | Facility: CLINIC | Age: 74
End: 2019-08-13
Payer: MEDICARE

## 2019-08-13 VITALS
BODY MASS INDEX: 31.41 KG/M2 | HEART RATE: 59 BPM | OXYGEN SATURATION: 99 % | WEIGHT: 160 LBS | HEIGHT: 60 IN | SYSTOLIC BLOOD PRESSURE: 159 MMHG | DIASTOLIC BLOOD PRESSURE: 69 MMHG | TEMPERATURE: 98 F

## 2019-08-13 DIAGNOSIS — M81.0 OSTEOPOROSIS WITHOUT CURRENT PATHOLOGICAL FRACTURE, UNSPECIFIED OSTEOPOROSIS TYPE: ICD-10-CM

## 2019-08-13 DIAGNOSIS — I77.819 AORTIC ECTASIA: Primary | ICD-10-CM

## 2019-08-13 DIAGNOSIS — Z12.31 SCREENING MAMMOGRAM, ENCOUNTER FOR: ICD-10-CM

## 2019-08-13 DIAGNOSIS — Z86.79 HISTORY OF THORACOABDOMINAL AORTIC ANEURYSM (TAAA): ICD-10-CM

## 2019-08-13 PROCEDURE — 77067 SCR MAMMO BI INCL CAD: CPT | Mod: 26,HCNC,, | Performed by: RADIOLOGY

## 2019-08-13 PROCEDURE — 99499 UNLISTED E&M SERVICE: CPT | Mod: HCNC,S$GLB,, | Performed by: PHYSICIAN ASSISTANT

## 2019-08-13 PROCEDURE — 71250 CT THORAX DX C-: CPT | Mod: TC,HCNC

## 2019-08-13 PROCEDURE — 71250 CT CHEST WITHOUT CONTRAST: ICD-10-PCS | Mod: 26,HCNC,, | Performed by: RADIOLOGY

## 2019-08-13 PROCEDURE — 77080 DXA BONE DENSITY AXIAL: CPT | Mod: TC,HCNC

## 2019-08-13 PROCEDURE — 77080 DEXA BONE DENSITY SPINE HIP: ICD-10-PCS | Mod: 26,HCNC,, | Performed by: INTERNAL MEDICINE

## 2019-08-13 PROCEDURE — 99214 OFFICE O/P EST MOD 30 MIN: CPT | Mod: HCNC,S$GLB,, | Performed by: THORACIC SURGERY (CARDIOTHORACIC VASCULAR SURGERY)

## 2019-08-13 PROCEDURE — 77067 MAMMO DIGITAL SCREENING BILAT WITH TOMOSYNTHESIS_CAD: ICD-10-PCS | Mod: 26,HCNC,, | Performed by: RADIOLOGY

## 2019-08-13 PROCEDURE — 99214 PR OFFICE/OUTPT VISIT, EST, LEVL IV, 30-39 MIN: ICD-10-PCS | Mod: HCNC,S$GLB,, | Performed by: THORACIC SURGERY (CARDIOTHORACIC VASCULAR SURGERY)

## 2019-08-13 PROCEDURE — 1101F PR PT FALLS ASSESS DOC 0-1 FALLS W/OUT INJ PAST YR: ICD-10-PCS | Mod: HCNC,CPTII,S$GLB, | Performed by: THORACIC SURGERY (CARDIOTHORACIC VASCULAR SURGERY)

## 2019-08-13 PROCEDURE — 99499 RISK ADDL DX/OHS AUDIT: ICD-10-PCS | Mod: HCNC,S$GLB,, | Performed by: PHYSICIAN ASSISTANT

## 2019-08-13 PROCEDURE — 1101F PT FALLS ASSESS-DOCD LE1/YR: CPT | Mod: HCNC,CPTII,S$GLB, | Performed by: THORACIC SURGERY (CARDIOTHORACIC VASCULAR SURGERY)

## 2019-08-13 PROCEDURE — 99999 PR PBB SHADOW E&M-EST. PATIENT-LVL III: ICD-10-PCS | Mod: PBBFAC,HCNC,, | Performed by: THORACIC SURGERY (CARDIOTHORACIC VASCULAR SURGERY)

## 2019-08-13 PROCEDURE — 77067 SCR MAMMO BI INCL CAD: CPT | Mod: TC,HCNC

## 2019-08-13 PROCEDURE — 77063 MAMMO DIGITAL SCREENING BILAT WITH TOMOSYNTHESIS_CAD: ICD-10-PCS | Mod: 26,HCNC,, | Performed by: RADIOLOGY

## 2019-08-13 PROCEDURE — 77063 BREAST TOMOSYNTHESIS BI: CPT | Mod: 26,HCNC,, | Performed by: RADIOLOGY

## 2019-08-13 PROCEDURE — 99999 PR PBB SHADOW E&M-EST. PATIENT-LVL III: CPT | Mod: PBBFAC,HCNC,, | Performed by: THORACIC SURGERY (CARDIOTHORACIC VASCULAR SURGERY)

## 2019-08-13 PROCEDURE — 77080 DXA BONE DENSITY AXIAL: CPT | Mod: 26,HCNC,, | Performed by: INTERNAL MEDICINE

## 2019-08-13 PROCEDURE — 71250 CT THORAX DX C-: CPT | Mod: 26,HCNC,, | Performed by: RADIOLOGY

## 2019-08-20 ENCOUNTER — LAB VISIT (OUTPATIENT)
Dept: LAB | Facility: HOSPITAL | Age: 74
End: 2019-08-20
Attending: INTERNAL MEDICINE
Payer: MEDICARE

## 2019-08-20 DIAGNOSIS — E78.2 MIXED HYPERLIPIDEMIA: ICD-10-CM

## 2019-08-20 DIAGNOSIS — E55.9 VITAMIN D DEFICIENCY DISEASE: ICD-10-CM

## 2019-08-20 LAB
25(OH)D3+25(OH)D2 SERPL-MCNC: 34 NG/ML (ref 30–96)
ALBUMIN SERPL BCP-MCNC: 4.2 G/DL (ref 3.5–5.2)
ALP SERPL-CCNC: 44 U/L (ref 38–126)
ALT SERPL W/O P-5'-P-CCNC: 24 U/L (ref 10–44)
ANION GAP SERPL CALC-SCNC: 3 MMOL/L (ref 8–16)
AST SERPL-CCNC: 23 U/L (ref 15–46)
BASOPHILS # BLD AUTO: 0.01 K/UL (ref 0–0.2)
BASOPHILS NFR BLD: 0.2 % (ref 0–1.9)
BILIRUB SERPL-MCNC: 0.5 MG/DL (ref 0.1–1)
BUN SERPL-MCNC: 16 MG/DL (ref 7–17)
CALCIUM SERPL-MCNC: 9.3 MG/DL (ref 8.7–10.5)
CHLORIDE SERPL-SCNC: 106 MMOL/L (ref 95–110)
CHOLEST SERPL-MCNC: 133 MG/DL (ref 120–199)
CHOLEST/HDLC SERPL: 2.5 {RATIO} (ref 2–5)
CO2 SERPL-SCNC: 31 MMOL/L (ref 23–29)
CREAT SERPL-MCNC: 0.8 MG/DL (ref 0.5–1.4)
DIFFERENTIAL METHOD: NORMAL
EOSINOPHIL # BLD AUTO: 0.1 K/UL (ref 0–0.5)
EOSINOPHIL NFR BLD: 2 % (ref 0–8)
ERYTHROCYTE [DISTWIDTH] IN BLOOD BY AUTOMATED COUNT: 13.7 % (ref 11.5–14.5)
EST. GFR  (AFRICAN AMERICAN): >60 ML/MIN/1.73 M^2
EST. GFR  (NON AFRICAN AMERICAN): >60 ML/MIN/1.73 M^2
GLUCOSE SERPL-MCNC: 95 MG/DL (ref 70–110)
HCT VFR BLD AUTO: 39.4 % (ref 37–48.5)
HDLC SERPL-MCNC: 53 MG/DL (ref 40–75)
HDLC SERPL: 39.8 % (ref 20–50)
HGB BLD-MCNC: 12.8 G/DL (ref 12–16)
LDLC SERPL CALC-MCNC: 50.2 MG/DL (ref 63–159)
LYMPHOCYTES # BLD AUTO: 2.2 K/UL (ref 1–4.8)
LYMPHOCYTES NFR BLD: 36.9 % (ref 18–48)
MCH RBC QN AUTO: 29 PG (ref 27–31)
MCHC RBC AUTO-ENTMCNC: 32.5 G/DL (ref 32–36)
MCV RBC AUTO: 89 FL (ref 82–98)
MONOCYTES # BLD AUTO: 0.6 K/UL (ref 0.3–1)
MONOCYTES NFR BLD: 9.2 % (ref 4–15)
NEUTROPHILS # BLD AUTO: 3.1 K/UL (ref 1.8–7.7)
NEUTROPHILS NFR BLD: 51.7 % (ref 38–73)
NONHDLC SERPL-MCNC: 80 MG/DL
PLATELET # BLD AUTO: 177 K/UL (ref 150–350)
PMV BLD AUTO: 10.1 FL (ref 9.2–12.9)
POTASSIUM SERPL-SCNC: 4.4 MMOL/L (ref 3.5–5.1)
PROT SERPL-MCNC: 7 G/DL (ref 6–8.4)
RBC # BLD AUTO: 4.41 M/UL (ref 4–5.4)
SODIUM SERPL-SCNC: 140 MMOL/L (ref 136–145)
TRIGL SERPL-MCNC: 149 MG/DL (ref 30–150)
TSH SERPL DL<=0.005 MIU/L-ACNC: 2.2 UIU/ML (ref 0.4–4)
WBC # BLD AUTO: 6.01 K/UL (ref 3.9–12.7)

## 2019-08-20 PROCEDURE — 80053 COMPREHEN METABOLIC PANEL: CPT | Mod: HCNC,PO

## 2019-08-20 PROCEDURE — 80061 LIPID PANEL: CPT | Mod: HCNC

## 2019-08-20 PROCEDURE — 85025 COMPLETE CBC W/AUTO DIFF WBC: CPT | Mod: HCNC,PO

## 2019-08-20 PROCEDURE — 84443 ASSAY THYROID STIM HORMONE: CPT | Mod: HCNC,PO

## 2019-08-20 PROCEDURE — 82306 VITAMIN D 25 HYDROXY: CPT | Mod: HCNC,PO

## 2019-08-20 PROCEDURE — 36415 COLL VENOUS BLD VENIPUNCTURE: CPT | Mod: HCNC,PO

## 2019-08-22 ENCOUNTER — OFFICE VISIT (OUTPATIENT)
Dept: INTERNAL MEDICINE | Facility: CLINIC | Age: 74
End: 2019-08-22
Payer: MEDICARE

## 2019-08-22 VITALS
TEMPERATURE: 98 F | SYSTOLIC BLOOD PRESSURE: 120 MMHG | DIASTOLIC BLOOD PRESSURE: 70 MMHG | WEIGHT: 162.5 LBS | OXYGEN SATURATION: 98 % | HEIGHT: 60 IN | HEART RATE: 64 BPM | BODY MASS INDEX: 31.9 KG/M2

## 2019-08-22 VITALS — WEIGHT: 160 LBS | BODY MASS INDEX: 31.25 KG/M2 | SYSTOLIC BLOOD PRESSURE: 120 MMHG | DIASTOLIC BLOOD PRESSURE: 75 MMHG

## 2019-08-22 DIAGNOSIS — I70.0 AORTIC ATHEROSCLEROSIS: ICD-10-CM

## 2019-08-22 DIAGNOSIS — E78.2 MIXED HYPERLIPIDEMIA: ICD-10-CM

## 2019-08-22 DIAGNOSIS — I77.819 AORTIC ECTASIA: ICD-10-CM

## 2019-08-22 DIAGNOSIS — K21.9 GASTROESOPHAGEAL REFLUX DISEASE WITHOUT ESOPHAGITIS: ICD-10-CM

## 2019-08-22 DIAGNOSIS — J84.9 ILD (INTERSTITIAL LUNG DISEASE): ICD-10-CM

## 2019-08-22 DIAGNOSIS — Z00.00 ENCOUNTER FOR PREVENTIVE HEALTH EXAMINATION: Primary | ICD-10-CM

## 2019-08-22 DIAGNOSIS — M81.0 AGE-RELATED OSTEOPOROSIS WITHOUT CURRENT PATHOLOGICAL FRACTURE: ICD-10-CM

## 2019-08-22 DIAGNOSIS — E66.9 OBESITY (BMI 30.0-34.9): ICD-10-CM

## 2019-08-22 DIAGNOSIS — Z00.00 ANNUAL PHYSICAL EXAM: Primary | ICD-10-CM

## 2019-08-22 PROBLEM — M54.50 LOWER BACK PAIN: Status: RESOLVED | Noted: 2019-07-29 | Resolved: 2019-08-22

## 2019-08-22 PROCEDURE — 99999 PR PBB SHADOW E&M-EST. PATIENT-LVL IV: ICD-10-PCS | Mod: PBBFAC,HCNC,, | Performed by: NURSE PRACTITIONER

## 2019-08-22 PROCEDURE — G0439 PR MEDICARE ANNUAL WELLNESS SUBSEQUENT VISIT: ICD-10-PCS | Mod: HCNC,S$GLB,, | Performed by: NURSE PRACTITIONER

## 2019-08-22 PROCEDURE — G0439 PPPS, SUBSEQ VISIT: HCPCS | Mod: HCNC,S$GLB,, | Performed by: NURSE PRACTITIONER

## 2019-08-22 PROCEDURE — 99999 PR PBB SHADOW E&M-EST. PATIENT-LVL IV: ICD-10-PCS | Mod: PBBFAC,HCNC,, | Performed by: INTERNAL MEDICINE

## 2019-08-22 PROCEDURE — 99397 PER PM REEVAL EST PAT 65+ YR: CPT | Mod: HCNC,S$GLB,, | Performed by: INTERNAL MEDICINE

## 2019-08-22 PROCEDURE — 99999 PR PBB SHADOW E&M-EST. PATIENT-LVL IV: CPT | Mod: PBBFAC,HCNC,, | Performed by: INTERNAL MEDICINE

## 2019-08-22 PROCEDURE — 99499 UNLISTED E&M SERVICE: CPT | Mod: HCNC,S$GLB,, | Performed by: INTERNAL MEDICINE

## 2019-08-22 PROCEDURE — 99999 PR PBB SHADOW E&M-EST. PATIENT-LVL IV: CPT | Mod: PBBFAC,HCNC,, | Performed by: NURSE PRACTITIONER

## 2019-08-22 PROCEDURE — 99397 PR PREVENTIVE VISIT,EST,65 & OVER: ICD-10-PCS | Mod: HCNC,S$GLB,, | Performed by: INTERNAL MEDICINE

## 2019-08-22 PROCEDURE — 99499 RISK ADDL DX/OHS AUDIT: ICD-10-PCS | Mod: HCNC,S$GLB,, | Performed by: INTERNAL MEDICINE

## 2019-08-22 RX ORDER — LORAZEPAM 0.5 MG/1
0.5 TABLET ORAL
Qty: 30 TABLET | Refills: 0 | Status: SHIPPED | OUTPATIENT
Start: 2019-08-22 | End: 2023-11-14 | Stop reason: SDUPTHER

## 2019-08-22 RX ORDER — ROSUVASTATIN CALCIUM 10 MG/1
10 TABLET, COATED ORAL DAILY
Qty: 90 TABLET | Refills: 3 | Status: SHIPPED | OUTPATIENT
Start: 2019-08-22 | End: 2020-02-29 | Stop reason: SDUPTHER

## 2019-08-22 NOTE — PATIENT INSTRUCTIONS
Counseling and Referral of Other Preventative  (Italic type indicates deductible and co-insurance are waived)    Patient Name: Ross Rodriguez  Today's Date: 8/22/2019    Health Maintenance       Date Due Completion Date    Shingles Vaccine (2 of 3) 08/22/2020 (Originally 7/30/2015) 6/4/2015    Influenza Vaccine (1) 09/01/2019 9/26/2018    High Dose Statin 08/13/2020 8/13/2019    Mammogram 08/13/2020 8/13/2019    DEXA SCAN 08/13/2021 8/13/2019    Override on 1/24/2012: Done    Lipid Panel 08/20/2024 8/20/2019    Colonoscopy 07/21/2025 7/21/2015    Override on 1/25/2005: Done    TETANUS VACCINE 06/21/2026 6/21/2016        No orders of the defined types were placed in this encounter.    The following information is provided to all patients.  This information is to help you find resources for any of the problems found today that may be affecting your health:                Living healthy guide: www.[facility].[place].gov      Understanding Diabetes: www.diabetes.org      Eating healthy: www.cdc.gov/healthyweight      CDC home safety checklist: www.cdc.gov/steadi/patient.html      Agency on Aging: www.goea.[place].gov      Alcoholics anonymous (AA): www.aa.org      Physical Activity: www.lonnie.nih.gov/mw3tpdb      Tobacco use: www.quitwithusla.org

## 2019-08-22 NOTE — PROGRESS NOTES
[facility]  History & Physical    Patient Name: Ross Rodriguez  MRN: [id number]  Admission Date: 08/22/2019    PCP:     Nahomi Nolasco MD    CC:     Chief Complaint   Patient presents with    Annual Exam       HISTORY OF PRESENT ILLNESS:     Ross Rodriguez is a 74 y.o. 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    Ms Rodriguez presents for annual exam. She is in a good state of health and requests that her Crestor prescription be sent to [facility] rather than [facility].     She had an episode of back pain that was quite severe a few weeks ago but resolved fairly quickly.  She was seen in urgent care and was frustrated that she was not able to get anything stronger than Tylenol.  However, at this point, pain has resolved and she does not desire any further treatment.  She has been getting physical therapy.  Hygienic measures reviewed.  No fever, chills, sweats, back pain or any unexplained weight loss or any other symptoms currently.  Her allergies to NSAIDs prevents her from using those class of medications.     Ms Rodriguez also received a recent CT scan with reticular changes found in her lungs. She wants a recommendation for a pulmonologist to monitor her lungs.  She has never smoked.  No wheezing, cough or hemoptysis.  Sometimes has some shortness of breath with exertion only.  Is trying to exercise and no other limitations.           REVIEW OF SYSTEMS:     -- Constitutional: No fever or chills.  -- Eyes: No visual changes  -- Respiratory: Cough (sinuses), No shortness of breath, wheezing.  -- Cardiovascular: No chest pain or palpitations.   -- Gastrointestinal:  No vomiting, abdominal pain, or change in bowel habits.  -- Genitourinary: No hematuria, dysuria,         PAST MEDICAL / SURGICAL HISTORY:     Past Medical History:   Diagnosis Date    Allergy     Anxiety     Aortic atherosclerosis: see CT scan 7/18 8/21/2018    Arthritis     Cataract     Depression     Diverticulosis 7/21/2015    GERD (gastroesophageal reflux disease)     Hyperlipidemia     Joint pain     Liver cyst: see CT 2009 6/21/2016    Lower back pain 7/29/2019    Lung cyst: R side see CT 2009 6/21/2016    Nuclear sclerosis - Both Eyes 2/14/2014    Osteoporosis     Primary insomnia 6/21/2016    Primary insomnia 6/21/2016     Past Surgical History:   Procedure Laterality Date    CHOLECYSTECTOMY      COLONOSCOPY N/A 7/21/2015    Performed by PERCY Ling MD at [facility] (4TH FLR)    DILATION AND CURETTAGE OF UTERUS      WISDOM TOOTH EXTRACTION           FAMILY HISTORY:     Family History   Problem Relation Age of Onset    Stroke Mother     Heart disease Mother         rheumatic fever    Cancer Father         Bladder    COPD Father     Heart disease Father         CHF    Kidney disease Father     Diabetes Father     Hearing loss Father     Thrombosis Maternal Grandfather     Cancer Paternal Grandmother         stomach    Nevaeh's disease Paternal Grandfather     No Known Problems Son     Skin cancer Paternal Aunt     Melanoma Neg Hx     Amblyopia Neg Hx     Blindness Neg Hx     Cataracts Neg Hx     Glaucoma Neg Hx     Hypertension Neg Hx     Macular degeneration Neg Hx     Retinal detachment Neg Hx     Strabismus Neg Hx     Thyroid disease Neg Hx          SOCIAL HISTORY:     Social History     Socioeconomic History    Marital status:      Spouse name: Not on file    Number of children: Not on file    Years of education: Not on file    Highest education level: Not on file   Occupational History    Occupation: retired    Social Needs    Financial  resource strain: Not hard at all    Food insecurity:     Worry: Never true     Inability: Never true    Transportation needs:     Medical: No     Non-medical: No   Tobacco Use    Smoking status: Never Smoker    Smokeless tobacco: Never Used   Substance and Sexual Activity    Alcohol use: Yes     Frequency: Monthly or less     Drinks per session: 1 or 2     Binge frequency: Never     Comment: once a month    Drug use: No    Sexual activity: Not Currently     Birth control/protection: Post-menopausal   Lifestyle    Physical activity:     Days per week: 3 days     Minutes per session: 40 min    Stress: Only a little   Relationships    Social connections:     Talks on phone: More than three times a week     Gets together: Three times a week     Attends [employer] service: Not on file     Active member of club or organization: Yes     Attends meetings of clubs or organizations: More than 4 times per year     Relationship status:    Other Topics Concern    Are you pregnant or think you may be? Not Asked    Breast-feeding Not Asked   Social History Narrative    Not on file         ALLERGIES:       Review of patient's allergies indicates:   Allergen Reactions    Fosamax [alendronate] Nausea Only    Nsaids (non-steroidal anti-inflammatory drug) Hives    Sulfa (sulfonamide antibiotics) Hives         HEALTH SCREENING:         HOME MEDICATIONS:     Prior to Admission medications    Medication Sig Start Date End Date Taking? Authorizing Provider   acetaminophen (TYLENOL) 500 mg Cap  1/27/18  Yes Historical Provider, MD   calcium citrate-vitamin D2 1,500-200 mg-unit Tab  1/27/18  Yes Historical Provider, MD   denosumab (PROLIA) 60 mg/mL Syrg Inject 60 mg into the skin every 6 (six) months.   Yes Historical Provider, MD   fish oil-omega-3 fatty acids 300-1,000 mg capsule Take 2 g by mouth once daily. Takes 2 times daily   Yes Historical Provider, MD   FLAXSEED ORAL Take 1 application by mouth once daily at  6am. Add seeds to oatmeal   Yes Historical Provider, MD   multivitamin (THERAGRAN) per tablet Take 1 tablet by mouth once daily.   Yes Historical Provider, MD   rosuvastatin (CRESTOR) 10 MG tablet Take 1 tablet (10 mg total) by mouth once daily. 10/3/18 10/3/19 Yes Nahomi Nolasco MD   LORazepam (ATIVAN) 0.5 MG tablet Take 1 tablet (0.5 mg total) by mouth as needed for Anxiety (May take 1-2 x weekly for anxiety or for travel). 8/21/18 10/22/18  Nahomi Nolasco MD   nitrofurantoin, macrocrystal-monohydrate, (MACROBID) 100 MG capsule Take 1 capsule (100 mg total) by mouth 2 (two) times daily. 5/1/19 8/22/19  Nahomi Nolasco MD          PHYSICAL EXAM:     Wt Readings from Last 1 Encounters:   08/22/19 1108 72.6 kg (160 lb)     Body mass index is 31.25 kg/m².  Vitals:    08/22/19 1108   BP: 120/75   BP Location: Right arm   Patient Position: Sitting   BP Method: Medium (Manual)   Weight: 72.6 kg (160 lb)        -- General appearance: well developed. appears stated age   -- Head: normocephalic, atraumatic   -- Lungs: clear to auscultation bilaterally. normal respiratory effort. No use of accessory muscles.   -- Heart: regular rate and regular rhythm. S1, S2 normal.  no click, rub or gallop   -- Abdomen: soft, non-tender, non-distended, non-tympanic; bowel sounds normal; no masses       LABORATORY STUDIES:     No results found for this or any previous visit (from the past 36 hour(s)).     No results found for: INR, PROTIME  Lab Results   Component Value Date    HGBA1C 5.2 08/16/2018     No results for input(s): POCTGLUCOSE in the last 72 hours.      ASSESSMENT & PLAN:     Primary Diagnosis:  Annual Exam    Ms Rodriguez is in a good state of health. She has no current complaints. She requests to see a pulmonologist to review her CT scans and monitor her lung tests. Her vitals and labs were reviewed and found to be normal. She requests a benzodiazapine prescription for her upcoming air travel.    1) PFTs ordered, will also  have her get established in Pulmonary given the interstitial lung disease pattern on her CT.  Issues reviewed and disease education provided  2) Continue Crestor; labs acceptable.  Anticipate recheck in 1 year  3) Influenza vaccine and shingles vaccines when available  4) Follow up in 1 year  5)  reviewed for her Ativan, uses very sparingly, last prescribed 1 year ago  6) back exercises and stretches reviewed, back hygiene reviewed, follow-up poor results.  7) keep scheduled follow-up for her TA as has been previously organized      I have personally taken the history and examined this patient and I agree with the student's assessment and plan as above.    ===============================================================    Salvatore Caballero

## 2019-08-22 NOTE — PROGRESS NOTES
Ross Rodriguez presented for a  Medicare AWV and comprehensive Health Risk Assessment today. The following components were reviewed and updated:    · Medical history  · Family History  · Social history  · Allergies and Current Medications  · Health Risk Assessment  · Health Maintenance  · Care Team     ** See Completed Assessments for Annual Wellness Visit within the encounter summary.**       The following assessments were completed:  · Living Situation  · CAGE  · Depression Screening  · Timed Get Up and Go  · Whisper Test  · Cognitive Function Screening        · Nutrition Screening  · ADL Screening  · PAQ Screening    Vitals:    08/22/19 0951   BP: 120/70   BP Location: Left arm   Patient Position: Sitting   BP Method: Large (Manual)   Pulse: 64   Temp: 97.7 °F (36.5 °C)   SpO2: 98%   Weight: 73.7 kg (162 lb 7.7 oz)   Height: 5' (1.524 m)     Body mass index is 31.73 kg/m².  Physical Exam   Constitutional: She is oriented to person, place, and time. Vital signs are normal. She appears well-developed and well-nourished.   obese   HENT:   Head: Normocephalic.   Right Ear: Hearing, tympanic membrane, external ear and ear canal normal.   Left Ear: Hearing, tympanic membrane, external ear and ear canal normal.   Eyes: Pupils are equal, round, and reactive to light. Conjunctivae, EOM and lids are normal. Lids are everted and swept, no foreign bodies found.   Neck: Trachea normal, normal range of motion and full passive range of motion without pain. Neck supple. No JVD present. Carotid bruit is not present.   Cardiovascular: Normal rate, regular rhythm, S1 normal, S2 normal, normal heart sounds, intact distal pulses and normal pulses.   Pulmonary/Chest: Effort normal and breath sounds normal.   Abdominal: Soft. Normal appearance and bowel sounds are normal. There is no hepatosplenomegaly.   obese   Musculoskeletal: Normal range of motion.   Neurological: She is alert and oriented to person, place, and time. She has normal  strength and normal reflexes.   Skin: Skin is warm, dry and intact. Capillary refill takes less than 2 seconds.   Psychiatric: She has a normal mood and affect. Her speech is normal and behavior is normal. Judgment and thought content normal. Cognition and memory are normal.   Nursing note and vitals reviewed.        Diagnoses and health risks identified today and associated recommendations/orders:    1. Encounter for preventive health examination  Exam done    Health Maintenance updated    Records reviewed    2. Aortic ectasia: ascending aorta 4.7 cm 2016 (CT); 4.4 2017  Chronic, followed by PCP    3. Aortic atherosclerosis: see CT scan 7/18  Chronic, followed by PCP    4. Mixed hyperlipidemia  Stable, followed by PCP    Continue cholesterol med, low fat diet, and exercise. Check lipids.    5. Gastroesophageal reflux disease without esophagitis  Stable, followed by PCP    GERD stable on meds, education on dietary triggers done    6. Age-related osteoporosis without current pathological fracture  Chronic, followed by PCP    On Prolia Injections    7. BMI 31.0-31.9,adult  BMI reviewed    8. Obesity (BMI 30.0-34.9)  BMI reviewed.    Diet and exercise to lose weight.      Provided Joyclyn with a 5-10 year written screening schedule and personal prevention plan. Recommendations were developed using the USPSTF age appropriate recommendations. Education, counseling, and referrals were provided as needed. After Visit Summary printed and given to patient which includes a list of additional screenings\tests needed.    Follow up today (on 8/22/2019), or at 11am today with PCP Dr. Nolasco as scheduled.    Demetrice Kahn DNP  I offered to discuss end of life issues, including information on how to make advance directives that the patient could use to name someone who would make medical decisions on their behalf if they became too ill to make themselves.    ___Patient declined  _X_Patient is interested, I provided paper work and  offered to discuss.

## 2019-11-01 ENCOUNTER — HOSPITAL ENCOUNTER (OUTPATIENT)
Dept: PULMONOLOGY | Facility: CLINIC | Age: 74
Discharge: HOME | End: 2019-11-01
Payer: MEDICARE

## 2019-11-01 DIAGNOSIS — J84.9 ILD (INTERSTITIAL LUNG DISEASE): ICD-10-CM

## 2019-11-01 LAB
DLCO ADJ PRE: 12.1 ML/(MIN*MMHG) (ref 12.24–23.7)
DLCO SINGLE BREATH LLN: 12.24
DLCO SINGLE BREATH PRE REF: 67.3 %
DLCO SINGLE BREATH REF: 17.97
DLCOC SBVA LLN: 2.56
DLCOC SBVA PRE REF: 90 %
DLCOC SBVA REF: 4.24
DLCOC SINGLE BREATH LLN: 12.24
DLCOC SINGLE BREATH PRE REF: 67.3 %
DLCOC SINGLE BREATH REF: 17.97
DLCOCSBVAULN: 5.91
DLCOCSINGLEBREATHULN: 23.7
DLCOSINGLEBREATHULN: 23.7
DLCOVA LLN: 2.56
DLCOVA PRE REF: 90 %
DLCOVA PRE: 3.81 ML/(MIN*MMHG*L) (ref 2.56–5.91)
DLCOVA REF: 4.24
DLCOVAULN: 5.91
DLVAADJ PRE: 3.81 ML/(MIN*MMHG*L) (ref 2.56–5.91)
ERV LLN: 0.53
ERV REF: 0.53
ERVN2 LLN: 0.53
ERVN2 PRE REF: 43.5 %
ERVN2 PRE: 0.23 L (ref 0.53–0.53)
ERVN2 REF: 0.53
ERVN2ULN: 0.53
ERVULN: 0.53
FEF 25 75 LLN: 0.67
FEF 25 75 PRE REF: 73.9 %
FEF 25 75 REF: 1.54
FET100 CHG: -3.2 %
FEV05 LLN: 0.58
FEV05 REF: 1.43
FEV1 CHG: 6.3 %
FEV1 FVC LLN: 64
FEV1 FVC PRE REF: 94.5 %
FEV1 FVC REF: 78
FEV1 LLN: 1.27
FEV1 PRE REF: 85 %
FEV1 REF: 1.78
FRCN2 LLN: 1.66
FRCN2 PRE REF: 69.8 %
FRCN2 REF: 2.48
FRCN2ULN: 3.3
FRCPLETH LLN: 1.66
FRCPLETH REF: 2.48
FRCPLETHULN: 3.3
FVC CHG: 1.4 %
FVC LLN: 1.65
FVC PRE REF: 89.2 %
FVC REF: 2.3
IVC PRE: 1.86 L (ref 1.65–2.96)
IVC SINGLE BREATH LLN: 1.65
IVC SINGLE BREATH PRE REF: 80.9 %
IVC SINGLE BREATH REF: 2.3
IVCSINGLEBREATHULN: 2.96
MVV LLN: 52
MVV REF: 61
PEF LLN: 3.13
PEF PRE REF: 80.8 %
PEF REF: 4.63
POST FEF 25 75: 1.39 L/S (ref 0.67–2.4)
POST FET 100: 6.45 SEC
POST FEV1 FVC: 77.34 % (ref 63.96–92.17)
POST FEV1: 1.61 L (ref 1.27–2.29)
POST FEV5: 1.21 L (ref 0.58–2.29)
POST FVC: 2.08 L (ref 1.65–2.96)
POST PEF: 3.96 L/S (ref 3.13–6.12)
PRE DLCO: 12.1 ML/(MIN*MMHG) (ref 12.24–23.7)
PRE FEF 25 75: 1.14 L/S (ref 0.67–2.4)
PRE FET 100: 6.66 SEC
PRE FEV05 REF: 79.1 %
PRE FEV1 FVC: 73.77 % (ref 63.96–92.17)
PRE FEV1: 1.52 L (ref 1.27–2.29)
PRE FEV5: 1.13 L (ref 0.58–2.29)
PRE FRC N2: 1.73 L
PRE FVC: 2.06 L (ref 1.65–2.96)
PRE PEF: 3.74 L/S (ref 3.13–6.12)
RV LLN: 1.38
RV REF: 1.95
RVN2 LLN: 1.38
RVN2 PRE REF: 76.9 %
RVN2 PRE: 1.5 L (ref 1.38–2.53)
RVN2 REF: 1.95
RVN2TLCN2 LLN: 34.87
RVN2TLCN2 PRE REF: 94.2 %
RVN2TLCN2 PRE: 41.9 % (ref 34.87–54.05)
RVN2TLCN2 REF: 44.46
RVN2TLCN2ULN: 54.05
RVN2ULN: 2.53
RVTLC LLN: 35
RVTLC REF: 44
RVTLCULN: 54
RVULN: 2.53
TLC LLN: 3.25
TLC REF: 4.24
TLC ULN: 5.23
TLCN2 LLN: 3.25
TLCN2 PRE REF: 84.4 %
TLCN2 PRE: 3.58 L (ref 3.25–5.23)
TLCN2 REF: 4.24
TLCN2ULN: 5.23
VA PRE: 3.18 L (ref 4.09–4.09)
VA SINGLE BREATH LLN: 4.09
VA SINGLE BREATH PRE REF: 77.6 %
VA SINGLE BREATH REF: 4.09
VASINGLEBREATHULN: 4.09
VC LLN: 1.65
VC REF: 2.3
VC ULN: 2.96
VCMAXN2 LLN: 1.65
VCMAXN2 PRE REF: 90.3 %
VCMAXN2 PRE: 2.08 L (ref 1.65–2.96)
VCMAXN2 REF: 2.3
VCMAXN2ULN: 2.96

## 2019-11-01 PROCEDURE — 94060 EVALUATION OF WHEEZING: CPT | Mod: S$GLB,,, | Performed by: INTERNAL MEDICINE

## 2019-11-01 PROCEDURE — 94060 PR EVAL OF BRONCHOSPASM: ICD-10-PCS | Mod: S$GLB,,, | Performed by: INTERNAL MEDICINE

## 2019-11-01 PROCEDURE — 94727 PR PULM FUNCTION TEST BY GAS: ICD-10-PCS | Mod: S$GLB,,, | Performed by: INTERNAL MEDICINE

## 2019-11-01 PROCEDURE — 94729 PR C02/MEMBANE DIFFUSE CAPACITY: ICD-10-PCS | Mod: S$GLB,,, | Performed by: INTERNAL MEDICINE

## 2019-11-01 PROCEDURE — 94727 GAS DIL/WSHOT DETER LNG VOL: CPT | Mod: S$GLB,,, | Performed by: INTERNAL MEDICINE

## 2019-11-01 PROCEDURE — 94729 DIFFUSING CAPACITY: CPT | Mod: S$GLB,,, | Performed by: INTERNAL MEDICINE
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
## 2019-11-04 ENCOUNTER — PATIENT MESSAGE (OUTPATIENT)
Dept: INTERNAL MEDICINE | Facility: CLINIC | Age: 74
End: 2019-11-04

## 2019-11-05 ENCOUNTER — OFFICE VISIT (OUTPATIENT)
Dept: PULMONOLOGY | Facility: CLINIC | Age: 74
End: 2019-11-05
Payer: MEDICARE

## 2019-11-05 VITALS
WEIGHT: 160.69 LBS | BODY MASS INDEX: 31.55 KG/M2 | OXYGEN SATURATION: 98 % | HEART RATE: 71 BPM | DIASTOLIC BLOOD PRESSURE: 68 MMHG | SYSTOLIC BLOOD PRESSURE: 130 MMHG | HEIGHT: 60 IN

## 2019-11-05 DIAGNOSIS — I70.0 AORTIC ATHEROSCLEROSIS: ICD-10-CM

## 2019-11-05 DIAGNOSIS — I77.819 AORTIC ECTASIA: ICD-10-CM

## 2019-11-05 DIAGNOSIS — I71.20 THORACIC AORTIC ANEURYSM WITHOUT RUPTURE: Primary | ICD-10-CM

## 2019-11-05 DIAGNOSIS — J84.9 ILD (INTERSTITIAL LUNG DISEASE): ICD-10-CM

## 2019-11-05 PROCEDURE — 1101F PT FALLS ASSESS-DOCD LE1/YR: CPT | Mod: HCNC,CPTII,S$GLB, | Performed by: INTERNAL MEDICINE

## 2019-11-05 PROCEDURE — 99999 PR PBB SHADOW E&M-EST. PATIENT-LVL III: CPT | Mod: PBBFAC,HCNC,, | Performed by: INTERNAL MEDICINE

## 2019-11-05 PROCEDURE — 99499 UNLISTED E&M SERVICE: CPT | Mod: S$GLB,,, | Performed by: INTERNAL MEDICINE

## 2019-11-05 PROCEDURE — 1101F PR PT FALLS ASSESS DOC 0-1 FALLS W/OUT INJ PAST YR: ICD-10-PCS | Mod: HCNC,CPTII,S$GLB, | Performed by: INTERNAL MEDICINE

## 2019-11-05 PROCEDURE — 99204 PR OFFICE/OUTPT VISIT, NEW, LEVL IV, 45-59 MIN: ICD-10-PCS | Mod: 25,HCNC,S$GLB, | Performed by: INTERNAL MEDICINE

## 2019-11-05 PROCEDURE — 99499 RISK ADDL DX/OHS AUDIT: ICD-10-PCS | Mod: S$GLB,,, | Performed by: INTERNAL MEDICINE

## 2019-11-05 PROCEDURE — 99999 PR PBB SHADOW E&M-EST. PATIENT-LVL III: ICD-10-PCS | Mod: PBBFAC,HCNC,, | Performed by: INTERNAL MEDICINE

## 2019-11-05 PROCEDURE — 99204 OFFICE O/P NEW MOD 45 MIN: CPT | Mod: 25,HCNC,S$GLB, | Performed by: INTERNAL MEDICINE

## 2019-11-05 NOTE — LETTER
November 5, 2019      Nahomi Nolasco MD  [address]  [facility] [address]           [facility]  [address]  [facility] [address]  Phone: [phone]          Patient: Ross Rodriguez   MR Number: [id number]   YOB: 1945   Date of Visit: 11/5/2019       Dear Dr. Nahomi Nolasco:    Thank you for referring Ross Rodriguez to me for evaluation. Attached you will find relevant portions of my assessment and plan of care.    If you have questions, please do not hesitate to call me. I look forward to following Ross Rodriguez along with you.    Sincerely,    Anabell Savage MD    Enclosure  CC:  No Recipients    If you would like to receive this communication electronically, please contact [email or web address] or [phone] to request more information on [employer] access.    For providers and/or their staff who would like to refer a patient to [facility], please contact us through our one-stop-shop provider referral line, [facility], at [phone].    If you feel you have received this communication in error or would no longer like to receive these types of communications, please e-mail [email or web address]

## 2019-11-05 NOTE — PROGRESS NOTES
Subjective:       Patient ID: Ross Rodriguez is a 74 y.o. female.    Chief Complaint: Interstitial Lung Disease    HPI   Ross Rodriguez 74 y.o. female    has a past medical history of Allergy, Anxiety, Aortic atherosclerosis: see CT scan 7/18 (8/21/2018), Arthritis, Cataract, Depression, Diverticulosis (7/21/2015), GERD (gastroesophageal reflux disease), Hyperlipidemia, Joint pain, Liver cyst: see CT 2009 (6/21/2016), Lower back pain (7/29/2019), Lung cyst: R side see CT 2009 (6/21/2016), Nuclear sclerosis - Both Eyes (2/14/2014), Osteoporosis, Primary insomnia (6/21/2016), and Primary insomnia (6/21/2016).    has a past surgical history that includes Cholecystectomy; Dilation and curettage of uterus; and [place] tooth extraction.   reports that she has never smoked. She has never used smokeless tobacco. She reports that she drinks alcohol. She reports that she does not use drugs.  Referred by: Dr. Nahomi Nolasco  Who had concerns including Interstitial Lung Disease.  The patient's last visit with me was on Visit date not found.    No increase in sob, no cough, sputum production  No pulmonary symptoms    No fever chills, ns, wt changes, nausea, vomiting, diarrhea, constipation, chest pain, tightness, pressure. Remainder of review of systems is negative.  Otherwise asymptomatic from pulmonary standpoint.    Review of Systems   All other systems reviewed and are negative.      Objective:      Physical Exam   Constitutional: She is oriented to person, place, and time. She appears well-developed and well-nourished. She appears not cachectic. No distress. She is not obese.   HENT:   Head: Normocephalic.   Nose: Nose normal. No mucosal edema.   Mouth/Throat: Normal dentition. No oropharyngeal exudate.   Neck: Normal range of motion. Neck supple. No tracheal deviation present.   Cardiovascular: Normal rate, regular rhythm, normal heart sounds and intact distal pulses. Exam reveals no gallop and no friction rub.   No murmur  heard.  Pulmonary/Chest: Normal expansion, symmetric chest wall expansion and effort normal. No stridor. She has rales.   Abdominal: Soft. Bowel sounds are normal.   Musculoskeletal: Normal range of motion. She exhibits no edema, tenderness or deformity.   Lymphadenopathy: No supraclavicular adenopathy is present.     She has no cervical adenopathy.   Neurological: She is alert and oriented to person, place, and time. No cranial nerve deficit. Gait normal.   Skin: Skin is warm and dry. No rash noted. She is not diaphoretic. No cyanosis or erythema. No pallor. Nails show no clubbing.   Psychiatric: She has a normal mood and affect. Her behavior is normal. Judgment and thought content normal.   Vitals reviewed.    Personal Diagnostic Review    No flowsheet data found.      Assessment:       1. Thoracic aortic aneurysm without rupture    2. ILD (interstitial lung disease): see CT 2019    3. Aortic ectasia: ascending aorta 4.7 cm 2016 (CT); stable 2019 follows in CTS    4. Aortic atherosclerosis: see CT scan 7/18        Outpatient Encounter Medications as of 11/5/2019   Medication Sig Dispense Refill    acetaminophen (TYLENOL) 500 mg Cap       calcium citrate-vitamin D2 1,500-200 mg-unit Tab       denosumab (PROLIA) 60 mg/mL Syrg Inject 60 mg into the skin every 6 (six) months.      fish oil-omega-3 fatty acids 300-1,000 mg capsule Take 2 g by mouth once daily. Takes 2 times daily      FLAXSEED ORAL Take 1 application by mouth once daily at 6am. Add seeds to oatmeal      FLUAD [id number], 65 YR UP,,PF, 45 mcg (15 mcg x 3)/0.5 mL Syrg ADM 0.5ML IM UTD  0    multivitamin (THERAGRAN) per tablet Take 1 tablet by mouth once daily.      rosuvastatin (CRESTOR) 10 MG tablet Take 1 tablet (10 mg total) by mouth once daily. 90 tablet 3    LORazepam (ATIVAN) 0.5 MG tablet Take 1 tablet (0.5 mg total) by mouth as needed for Anxiety (May take 1-2 x weekly for anxiety or for travel). 30 tablet 0     No facility-administered  encounter medications on file as of 11/5/2019.      Orders Placed This Encounter   Procedures    Echo     Standing Status:   Future     Standing Expiration Date:   11/5/2020       Plan:               Assessment:  Ross was seen today for interstitial lung disease.    Diagnoses and all orders for this visit:    Thoracic aortic aneurysm without rupture  -     Echo; Future    ILD (interstitial lung disease): see CT 2019    Aortic ectasia: ascending aorta 4.7 cm 2016 (CT); stable 2019 follows in CTS    Aortic atherosclerosis: see CT scan 7/18        Plan:  Problem List Items Addressed This Visit     Aortic atherosclerosis: see CT scan 7/18    Aortic ectasia: ascending aorta 4.7 cm 2016 (CT); stable 2019 follows in CTS    ILD (interstitial lung disease): see CT 2019    Overview     Very minimal, no changes in past 4 ct's  Pfts with no obstruction or restriction and mild diffusion impairment           Other Visit Diagnoses     Thoracic aortic aneurysm without rupture    -  Primary    Relevant Orders    Echo        Patient with history of abnl echo in the past, repeat echo to evaluate for dhf given sx of mild earl    No follow-ups on file.    There are no Patient Instructions on file for this visit.    Immunization History   Administered Date(s) Administered    Influenza - High Dose - PF (65 years and older) 10/09/2012, 10/01/2013, 10/29/2015, 10/24/2016, 10/04/2017, 09/26/2018    Influenza - Quadrivalent - PF (6 months and older) 10/07/2014    Pneumococcal Conjugate - 13 Valent 08/12/2015    Pneumococcal Polysaccharide - 23 Valent 02/20/2014    Tdap 06/21/2016    Zoster 06/04/2015

## 2019-11-06 ENCOUNTER — HOSPITAL ENCOUNTER (OUTPATIENT)
Dept: CARDIOLOGY | Facility: HOSPITAL | Age: 74
Discharge: HOME | End: 2019-11-06
Attending: INTERNAL MEDICINE
Payer: MEDICARE

## 2019-11-06 DIAGNOSIS — I71.20 THORACIC AORTIC ANEURYSM WITHOUT RUPTURE: ICD-10-CM

## 2019-11-06 LAB
AORTIC ROOT ANNULUS: 2.99 CM
AORTIC VALVE CUSP SEPERATION: 1.62 CM
ASCENDING AORTA: 4.3 CM
AV INDEX (PROSTH): 0.79
AV MEAN GRADIENT: 5 MMHG
AV PEAK GRADIENT: 9 MMHG
AV VALVE AREA: 2.56 CM2
AV VELOCITY RATIO: 0.68
CV ECHO LV RWT: 0.48 CM
DOP CALC AO PEAK VEL: 1.54 M/S
DOP CALC AO VTI: 35.43 CM
DOP CALC LVOT AREA: 3.2 CM2
DOP CALC LVOT DIAMETER: 2.03 CM
DOP CALC LVOT PEAK VEL: 1.05 M/S
DOP CALC LVOT STROKE VOLUME: 90.8 CM3
DOP CALCLVOT PEAK VEL VTI: 28.07 CM
E WAVE DECELERATION TIME: 242.14 MSEC
E/A RATIO: 0.85
ECHO LV POSTERIOR WALL: 1.05 CM (ref 0.6–1.1)
FRACTIONAL SHORTENING: 29 % (ref 28–44)
INTERVENTRICULAR SEPTUM: 0.98 CM (ref 0.6–1.1)
LA MAJOR: 4.15 CM
LEFT ATRIUM SIZE: 3.76 CM
LEFT INTERNAL DIMENSION IN SYSTOLE: 3.1 CM (ref 2.1–4)
LEFT VENTRICLE DIASTOLIC VOLUME: 84.71 ML
LEFT VENTRICLE SYSTOLIC VOLUME: 37.87 ML
LEFT VENTRICULAR INTERNAL DIMENSION IN DIASTOLE: 4.34 CM (ref 3.5–6)
LEFT VENTRICULAR MASS: 147.64 G
MV PEAK A VEL: 0.82 M/S
MV PEAK E VEL: 0.7 M/S
PISA TR MAX VEL: 2.41 M/S
PV PEAK VELOCITY: 0.75 CM/S
RA MAJOR: 4.57 CM
RA PRESSURE: 8 MMHG
RA WIDTH: 3.54 CM
RIGHT VENTRICULAR END-DIASTOLIC DIMENSION: 2.83 CM
SINUS: 3.3 CM
TR MAX PG: 23 MMHG
TRICUSPID ANNULAR PLANE SYSTOLIC EXCURSION: 2.11 CM
TV REST PULMONARY ARTERY PRESSURE: 31 MMHG

## 2019-11-06 PROCEDURE — 93306 TTE W/DOPPLER COMPLETE: CPT | Mod: HCNC,PO

## 2019-11-06 PROCEDURE — 93306 TTE W/DOPPLER COMPLETE: CPT | Mod: 26,HCNC,, | Performed by: STUDENT IN AN ORGANIZED HEALTH CARE EDUCATION/TRAINING PROGRAM

## 2019-11-06 PROCEDURE — 93306 ECHO (CUPID ONLY): ICD-10-PCS | Mod: 26,HCNC,, | Performed by: STUDENT IN AN ORGANIZED HEALTH CARE EDUCATION/TRAINING PROGRAM

## 2019-11-11 ENCOUNTER — OFFICE VISIT (OUTPATIENT)
Dept: ENDOCRINOLOGY | Facility: CLINIC | Age: 74
End: 2019-11-11
Payer: MEDICARE

## 2019-11-11 VITALS
HEART RATE: 78 BPM | DIASTOLIC BLOOD PRESSURE: 84 MMHG | BODY MASS INDEX: 31.96 KG/M2 | HEIGHT: 60 IN | WEIGHT: 162.81 LBS | SYSTOLIC BLOOD PRESSURE: 122 MMHG

## 2019-11-11 DIAGNOSIS — K21.9 GASTROESOPHAGEAL REFLUX DISEASE WITHOUT ESOPHAGITIS: ICD-10-CM

## 2019-11-11 DIAGNOSIS — M81.0 AGE-RELATED OSTEOPOROSIS WITHOUT CURRENT PATHOLOGICAL FRACTURE: Primary | ICD-10-CM

## 2019-11-11 DIAGNOSIS — E78.2 MIXED HYPERLIPIDEMIA: ICD-10-CM

## 2019-11-11 PROCEDURE — 99499 UNLISTED E&M SERVICE: CPT | Mod: S$GLB,,, | Performed by: NURSE PRACTITIONER

## 2019-11-11 PROCEDURE — 99999 PR PBB SHADOW E&M-EST. PATIENT-LVL IV: ICD-10-PCS | Mod: PBBFAC,HCNC,, | Performed by: NURSE PRACTITIONER

## 2019-11-11 PROCEDURE — 1101F PT FALLS ASSESS-DOCD LE1/YR: CPT | Mod: HCNC,CPTII,S$GLB, | Performed by: NURSE PRACTITIONER

## 2019-11-11 PROCEDURE — 1101F PR PT FALLS ASSESS DOC 0-1 FALLS W/OUT INJ PAST YR: ICD-10-PCS | Mod: HCNC,CPTII,S$GLB, | Performed by: NURSE PRACTITIONER

## 2019-11-11 PROCEDURE — 99214 OFFICE O/P EST MOD 30 MIN: CPT | Mod: HCNC,S$GLB,, | Performed by: NURSE PRACTITIONER

## 2019-11-11 PROCEDURE — 99999 PR PBB SHADOW E&M-EST. PATIENT-LVL IV: CPT | Mod: PBBFAC,HCNC,, | Performed by: NURSE PRACTITIONER

## 2019-11-11 PROCEDURE — 99214 PR OFFICE/OUTPT VISIT, EST, LEVL IV, 30-39 MIN: ICD-10-PCS | Mod: HCNC,S$GLB,, | Performed by: NURSE PRACTITIONER

## 2019-11-11 PROCEDURE — 99499 RISK ADDL DX/OHS AUDIT: ICD-10-PCS | Mod: S$GLB,,, | Performed by: NURSE PRACTITIONER

## 2019-11-11 NOTE — ASSESSMENT & PLAN NOTE
-- risks include: family history, PPI use   -- reassuring she is not fracturing   -- RDA of calcium and vitamin D, calcium from food sources are preferred   -- continue weight bearing exercises as tolerated   -- encouraged fall safety and fall precautions   -- continue Prolia injection due in Nov 2019  -- discussed low risk of ONJ and atypical femur fracture   -- pt advised to report new/ unusual hip, groin or thigh pain   -- recommend dental exam every 6 months   -- alerted that if dental work needs to be done it should be done prior to continuing therapy with Prolia  -- recommend repeat BMD in 08/2021  -- Let me know if you fracture anything

## 2019-11-11 NOTE — PATIENT INSTRUCTIONS
Discussed goal for replacement is Calcium 1200 mg daily and vitamin D 800 units daily, adequate exercise but dietary intake of calcium is preferable

## 2019-11-14 ENCOUNTER — TELEPHONE (OUTPATIENT)
Dept: INTERNAL MEDICINE | Facility: CLINIC | Age: 74
End: 2019-11-14

## 2019-11-14 NOTE — TELEPHONE ENCOUNTER
Spoke to [facility] and notified that those orders are coming from the Endo  Department not Pcp , phone number provided

## 2019-11-14 NOTE — TELEPHONE ENCOUNTER
----- Message from Jillian Ruiz sent at 11/14/2019 10:09 AM CST -----  Contact: Milagro [phone] ext [phone]  Patient health is calling to clarify Prolia injections  . Please call and advise, Thanks

## 2019-11-15 ENCOUNTER — TELEPHONE (OUTPATIENT)
Dept: ENDOCRINOLOGY | Facility: CLINIC | Age: 74
End: 2019-11-15

## 2019-11-15 NOTE — TELEPHONE ENCOUNTER
----- Message from Za Ortiz MA sent at 11/15/2019 12:28 PM CST -----  Contact: NARENDRA/Milagro -[phone]  [facility] is calling in reference to see if the doctor wanted to send over and order/ notes  for the pt ongoing in prolia injections.

## 2019-11-15 NOTE — TELEPHONE ENCOUNTER
Called [facility] and notified that prolia ordered by provider at [facility] and infusion nurse will give call to insurance company for approval.

## 2019-11-22 ENCOUNTER — INFUSION (OUTPATIENT)
Dept: INFECTIOUS DISEASES | Facility: HOSPITAL | Age: 74
End: 2019-11-22
Attending: INTERNAL MEDICINE
Payer: MEDICARE

## 2019-11-22 VITALS — HEIGHT: 60 IN | BODY MASS INDEX: 31.99 KG/M2 | WEIGHT: 162.94 LBS

## 2019-11-22 DIAGNOSIS — M81.0 AGE-RELATED OSTEOPOROSIS WITHOUT CURRENT PATHOLOGICAL FRACTURE: Primary | ICD-10-CM

## 2019-11-22 PROCEDURE — 63600175 PHARM REV CODE 636 W HCPCS: Mod: JG,HCNC | Performed by: NURSE PRACTITIONER

## 2019-11-22 PROCEDURE — 96372 THER/PROPH/DIAG INJ SC/IM: CPT | Mod: HCNC

## 2019-11-22 RX ADMIN — DENOSUMAB 60 MG: 60 INJECTION SUBCUTANEOUS at 09:11

## 2020-02-11 ENCOUNTER — OFFICE VISIT (OUTPATIENT)
Dept: OPTOMETRY | Facility: CLINIC | Age: 75
End: 2020-02-11
Payer: MEDICARE

## 2020-02-11 DIAGNOSIS — H25.13 NUCLEAR SCLEROSIS, BILATERAL: Primary | ICD-10-CM

## 2020-02-11 DIAGNOSIS — H04.123 BILATERAL DRY EYES: ICD-10-CM

## 2020-02-11 DIAGNOSIS — H35.363 DEGENERATIVE RETINAL DRUSEN OF BOTH EYES: ICD-10-CM

## 2020-02-11 PROCEDURE — 92134 CPTRZ OPH DX IMG PST SGM RTA: CPT | Mod: S$GLB,,, | Performed by: OPTOMETRIST

## 2020-02-11 PROCEDURE — 92014 PR EYE EXAM, EST PATIENT,COMPREHESV: ICD-10-PCS | Mod: S$GLB,,, | Performed by: OPTOMETRIST

## 2020-02-11 PROCEDURE — 99999 PR PBB SHADOW E&M-EST. PATIENT-LVL II: CPT | Mod: PBBFAC,,, | Performed by: OPTOMETRIST

## 2020-02-11 PROCEDURE — 99999 PR PBB SHADOW E&M-EST. PATIENT-LVL II: ICD-10-PCS | Mod: PBBFAC,,, | Performed by: OPTOMETRIST

## 2020-02-11 PROCEDURE — 92014 COMPRE OPH EXAM EST PT 1/>: CPT | Mod: S$GLB,,, | Performed by: OPTOMETRIST

## 2020-02-11 PROCEDURE — 92134 POSTERIOR SEGMENT OCT RETINA (OCULAR COHERENCE TOMOGRAPHY)-BOTH EYES: ICD-10-PCS | Mod: S$GLB,,, | Performed by: OPTOMETRIST

## 2020-02-11 NOTE — MEDICAL/APP STUDENT
Pt is here for annual eye exam.   Stable vision with current glasses rx. Defers refraction today.  Occasional dry eyes and uses ATs with some relief.   (+) glare at night but not too problematic.

## 2020-02-11 NOTE — PROGRESS NOTES
Assessment /Plan     For exam results, see Encounter Report.    Nuclear sclerosis, bilateral    Bilateral dry eyes    Degenerative retinal drusen of both eyes      1. Educated pt on presence of cataracts and effects on vision. No surgery at this time. Recheck in one year.  2. Recommend continue artificial tears. 1 drop 2x per day. Chronicity of disease and treatment discussed.  3. OCT shows some min drusen , some foveal shape change OD, Monitor condition. Patient to report any changes. RTC 1 year recheck.

## 2020-02-28 ENCOUNTER — PATIENT MESSAGE (OUTPATIENT)
Dept: INTERNAL MEDICINE | Facility: CLINIC | Age: 75
End: 2020-02-28

## 2020-02-29 ENCOUNTER — PATIENT MESSAGE (OUTPATIENT)
Dept: INTERNAL MEDICINE | Facility: CLINIC | Age: 75
End: 2020-02-29

## 2020-03-01 RX ORDER — ROSUVASTATIN CALCIUM 10 MG/1
10 TABLET, COATED ORAL DAILY
Qty: 90 TABLET | Refills: 3 | Status: SHIPPED | OUTPATIENT
Start: 2020-03-01 | End: 2020-08-04

## 2020-05-26 ENCOUNTER — INFUSION (OUTPATIENT)
Dept: INFECTIOUS DISEASES | Facility: HOSPITAL | Age: 75
End: 2020-05-26
Attending: INTERNAL MEDICINE
Payer: MEDICARE

## 2020-05-26 VITALS
TEMPERATURE: 99 F | BODY MASS INDEX: 31.99 KG/M2 | SYSTOLIC BLOOD PRESSURE: 140 MMHG | WEIGHT: 162.94 LBS | DIASTOLIC BLOOD PRESSURE: 69 MMHG | HEART RATE: 68 BPM | HEIGHT: 60 IN

## 2020-05-26 DIAGNOSIS — M81.0 AGE-RELATED OSTEOPOROSIS WITHOUT CURRENT PATHOLOGICAL FRACTURE: Primary | ICD-10-CM

## 2020-05-26 PROCEDURE — 96372 THER/PROPH/DIAG INJ SC/IM: CPT

## 2020-05-26 PROCEDURE — 63600175 PHARM REV CODE 636 W HCPCS: Mod: JG | Performed by: NURSE PRACTITIONER

## 2020-05-26 RX ADMIN — DENOSUMAB 60 MG: 60 INJECTION SUBCUTANEOUS at 10:05

## 2020-05-26 NOTE — PROGRESS NOTES
Patient received Prolia 60 mg injection sub Q in the left arm.  Tolerated well and left in NAD.    Patient is taking Calcium and Vit D

## 2020-06-23 ENCOUNTER — PATIENT MESSAGE (OUTPATIENT)
Dept: INTERNAL MEDICINE | Facility: CLINIC | Age: 75
End: 2020-06-23

## 2020-06-30 ENCOUNTER — PATIENT MESSAGE (OUTPATIENT)
Dept: INTERNAL MEDICINE | Facility: CLINIC | Age: 75
End: 2020-06-30

## 2020-07-05 DIAGNOSIS — I35.8 OTHER NONRHEUMATIC AORTIC VALVE DISORDERS: ICD-10-CM

## 2020-07-05 DIAGNOSIS — I71.21 ASCENDING AORTIC ANEURYSM: Primary | ICD-10-CM

## 2020-07-06 ENCOUNTER — TELEPHONE (OUTPATIENT)
Dept: CARDIOTHORACIC SURGERY | Facility: CLINIC | Age: 75
End: 2020-07-06

## 2020-07-06 NOTE — TELEPHONE ENCOUNTER
Attempted to contact pt to schedule annual f/u. No answer . Scheduled pt for 8/06 with Dr. Mcdaniel and CT before. Appt letter mailed

## 2020-08-04 ENCOUNTER — PATIENT MESSAGE (OUTPATIENT)
Dept: INTERNAL MEDICINE | Facility: CLINIC | Age: 75
End: 2020-08-04

## 2020-08-04 DIAGNOSIS — E78.2 MIXED HYPERLIPIDEMIA: Primary | ICD-10-CM

## 2020-08-04 DIAGNOSIS — E55.9 VITAMIN D DEFICIENCY DISEASE: ICD-10-CM

## 2020-08-04 DIAGNOSIS — R53.83 FATIGUE, UNSPECIFIED TYPE: ICD-10-CM

## 2020-08-04 DIAGNOSIS — Z00.00 PHYSICAL EXAM, ANNUAL: ICD-10-CM

## 2020-08-04 DIAGNOSIS — R73.01 IFG (IMPAIRED FASTING GLUCOSE): ICD-10-CM

## 2020-08-04 RX ORDER — ROSUVASTATIN CALCIUM 10 MG/1
TABLET, FILM COATED ORAL
COMMUNITY
Start: 2020-05-28 | End: 2021-01-05

## 2020-08-06 ENCOUNTER — OFFICE VISIT (OUTPATIENT)
Dept: CARDIOTHORACIC SURGERY | Facility: CLINIC | Age: 75
End: 2020-08-06
Payer: MEDICARE

## 2020-08-06 ENCOUNTER — HOSPITAL ENCOUNTER (OUTPATIENT)
Dept: RADIOLOGY | Facility: HOSPITAL | Age: 75
Discharge: HOME | End: 2020-08-06
Attending: THORACIC SURGERY (CARDIOTHORACIC VASCULAR SURGERY)
Payer: MEDICARE

## 2020-08-06 VITALS
BODY MASS INDEX: 31.64 KG/M2 | TEMPERATURE: 99 F | WEIGHT: 161.19 LBS | HEIGHT: 60 IN | HEART RATE: 66 BPM | SYSTOLIC BLOOD PRESSURE: 132 MMHG | OXYGEN SATURATION: 98 % | DIASTOLIC BLOOD PRESSURE: 70 MMHG

## 2020-08-06 DIAGNOSIS — I71.21 ASCENDING AORTIC ANEURYSM: ICD-10-CM

## 2020-08-06 DIAGNOSIS — I71.21 ASCENDING AORTIC ANEURYSM: Primary | ICD-10-CM

## 2020-08-06 DIAGNOSIS — I35.8 OTHER NONRHEUMATIC AORTIC VALVE DISORDERS: ICD-10-CM

## 2020-08-06 DIAGNOSIS — I77.819 AORTIC ECTASIA: Primary | ICD-10-CM

## 2020-08-06 PROCEDURE — 71250 CT THORAX DX C-: CPT | Mod: TC

## 2020-08-06 PROCEDURE — 99499 RISK ADDL DX/OHS AUDIT: ICD-10-PCS | Mod: S$GLB,,, | Performed by: THORACIC SURGERY (CARDIOTHORACIC VASCULAR SURGERY)

## 2020-08-06 PROCEDURE — 1101F PT FALLS ASSESS-DOCD LE1/YR: CPT | Mod: CPTII,S$GLB,, | Performed by: THORACIC SURGERY (CARDIOTHORACIC VASCULAR SURGERY)

## 2020-08-06 PROCEDURE — 99213 PR OFFICE/OUTPT VISIT, EST, LEVL III, 20-29 MIN: ICD-10-PCS | Mod: S$GLB,,, | Performed by: THORACIC SURGERY (CARDIOTHORACIC VASCULAR SURGERY)

## 2020-08-06 PROCEDURE — 99999 PR PBB SHADOW E&M-EST. PATIENT-LVL III: CPT | Mod: PBBFAC,,, | Performed by: THORACIC SURGERY (CARDIOTHORACIC VASCULAR SURGERY)

## 2020-08-06 PROCEDURE — 99499 UNLISTED E&M SERVICE: CPT | Mod: S$GLB,,, | Performed by: THORACIC SURGERY (CARDIOTHORACIC VASCULAR SURGERY)

## 2020-08-06 PROCEDURE — 99999 PR PBB SHADOW E&M-EST. PATIENT-LVL III: ICD-10-PCS | Mod: PBBFAC,,, | Performed by: THORACIC SURGERY (CARDIOTHORACIC VASCULAR SURGERY)

## 2020-08-06 PROCEDURE — 1159F MED LIST DOCD IN RCRD: CPT | Mod: S$GLB,,, | Performed by: THORACIC SURGERY (CARDIOTHORACIC VASCULAR SURGERY)

## 2020-08-06 PROCEDURE — 71250 CT CHEST WITHOUT CONTRAST: ICD-10-PCS | Mod: 26,,, | Performed by: RADIOLOGY

## 2020-08-06 PROCEDURE — 1101F PR PT FALLS ASSESS DOC 0-1 FALLS W/OUT INJ PAST YR: ICD-10-PCS | Mod: CPTII,S$GLB,, | Performed by: THORACIC SURGERY (CARDIOTHORACIC VASCULAR SURGERY)

## 2020-08-06 PROCEDURE — 1126F AMNT PAIN NOTED NONE PRSNT: CPT | Mod: S$GLB,,, | Performed by: THORACIC SURGERY (CARDIOTHORACIC VASCULAR SURGERY)

## 2020-08-06 PROCEDURE — 1126F PR PAIN SEVERITY QUANTIFIED, NO PAIN PRESENT: ICD-10-PCS | Mod: S$GLB,,, | Performed by: THORACIC SURGERY (CARDIOTHORACIC VASCULAR SURGERY)

## 2020-08-06 PROCEDURE — 71250 CT THORAX DX C-: CPT | Mod: 26,,, | Performed by: RADIOLOGY

## 2020-08-06 PROCEDURE — 99213 OFFICE O/P EST LOW 20 MIN: CPT | Mod: S$GLB,,, | Performed by: THORACIC SURGERY (CARDIOTHORACIC VASCULAR SURGERY)

## 2020-08-06 PROCEDURE — 1159F PR MEDICATION LIST DOCUMENTED IN MEDICAL RECORD: ICD-10-PCS | Mod: S$GLB,,, | Performed by: THORACIC SURGERY (CARDIOTHORACIC VASCULAR SURGERY)

## 2020-08-06 NOTE — PROGRESS NOTES
Subjective:      Patient ID: Ross Rodriguez is a 75 y.o. female.    Chief Complaint: No chief complaint on file.      HPI:  Ross Rodriguez is a 75 y.o. female who presents for 1 year follow up TAA. Last seen in September 2019. PMH of TAA (2009) which was not followed up per patient, HLD and GERD. Pt had CTA of chest from PCP, Dr. Nolasco and TAA was found to be 4.7cm in 2018. Pt has no complaints, does not require HTN medications, and is a nonsmoker. At last visit TAA measured 4.7 - 4.8cm. This has been stable since 2016.    Current medications Reviewed    Review of Systems  Objective:   Physical Exam    Diagnotic Results: reviewed   CT 8/6/2020  1.  Fusiform dilatation of the ascending aorta measuring on the order of 4.4-4.5 cm, no larger than on the recent study of 08/13/2019.  No saccular aneurysm.  Aorta has normal caliber and contour distally.  Modest calcific atherosclerosis in the isthmus and descending aorta.  No calcification in the ascending aortic wall or arch.     2.  Mitral annular calcification with or without circumflex calcific atherosclerosis.     3.  No pulmonary edema.  Mild peripheral pulmonary parenchymal reticulation is noted at the lateral aspects of the middle lobe, lingula and lower lobes, no worse than on 08/13/2019.  Assessment:   TAA ~4.6cm   Plan:   Follow up in 1 year with repeat CT scan     CTS Attending Note:    I have personally taken the history and examined this patient and agree with the NISH's note as stated above.

## 2020-08-14 ENCOUNTER — HOSPITAL ENCOUNTER (OUTPATIENT)
Dept: RADIOLOGY | Facility: HOSPITAL | Age: 75
Discharge: HOME | End: 2020-08-14
Attending: INTERNAL MEDICINE
Payer: MEDICARE

## 2020-08-14 DIAGNOSIS — Z12.31 SCREENING MAMMOGRAM, ENCOUNTER FOR: ICD-10-CM

## 2020-08-14 PROCEDURE — 77067 SCR MAMMO BI INCL CAD: CPT | Mod: TC,PO

## 2020-08-28 ENCOUNTER — PATIENT MESSAGE (OUTPATIENT)
Dept: INTERNAL MEDICINE | Facility: CLINIC | Age: 75
End: 2020-08-28

## 2020-08-28 DIAGNOSIS — N76.0 ACUTE VAGINITIS: Primary | ICD-10-CM

## 2020-08-28 NOTE — TELEPHONE ENCOUNTER
Please help schedule her or give her number for gyn, referral is in, they should be able to see her very soon thank you

## 2020-09-01 ENCOUNTER — OFFICE VISIT (OUTPATIENT)
Dept: OBSTETRICS AND GYNECOLOGY | Facility: CLINIC | Age: 75
End: 2020-09-01
Payer: MEDICARE

## 2020-09-01 VITALS
SYSTOLIC BLOOD PRESSURE: 126 MMHG | HEIGHT: 60 IN | DIASTOLIC BLOOD PRESSURE: 74 MMHG | BODY MASS INDEX: 31.66 KG/M2 | WEIGHT: 161.25 LBS

## 2020-09-01 DIAGNOSIS — N76.0 ACUTE VAGINITIS: Primary | ICD-10-CM

## 2020-09-01 PROCEDURE — 1101F PT FALLS ASSESS-DOCD LE1/YR: CPT | Mod: CPTII,S$GLB,, | Performed by: NURSE PRACTITIONER

## 2020-09-01 PROCEDURE — 99202 PR OFFICE/OUTPT VISIT, NEW, LEVL II, 15-29 MIN: ICD-10-PCS | Mod: S$GLB,,, | Performed by: NURSE PRACTITIONER

## 2020-09-01 PROCEDURE — 99202 OFFICE O/P NEW SF 15 MIN: CPT | Mod: S$GLB,,, | Performed by: NURSE PRACTITIONER

## 2020-09-01 PROCEDURE — 87510 GARDNER VAG DNA DIR PROBE: CPT

## 2020-09-01 PROCEDURE — 1126F PR PAIN SEVERITY QUANTIFIED, NO PAIN PRESENT: ICD-10-PCS | Mod: S$GLB,,, | Performed by: NURSE PRACTITIONER

## 2020-09-01 PROCEDURE — 1126F AMNT PAIN NOTED NONE PRSNT: CPT | Mod: S$GLB,,, | Performed by: NURSE PRACTITIONER

## 2020-09-01 PROCEDURE — 1159F MED LIST DOCD IN RCRD: CPT | Mod: S$GLB,,, | Performed by: NURSE PRACTITIONER

## 2020-09-01 PROCEDURE — 99999 PR PBB SHADOW E&M-EST. PATIENT-LVL IV: ICD-10-PCS | Mod: PBBFAC,,, | Performed by: NURSE PRACTITIONER

## 2020-09-01 PROCEDURE — 87480 CANDIDA DNA DIR PROBE: CPT

## 2020-09-01 PROCEDURE — 1101F PR PT FALLS ASSESS DOC 0-1 FALLS W/OUT INJ PAST YR: ICD-10-PCS | Mod: CPTII,S$GLB,, | Performed by: NURSE PRACTITIONER

## 2020-09-01 PROCEDURE — 99999 PR PBB SHADOW E&M-EST. PATIENT-LVL IV: CPT | Mod: PBBFAC,,, | Performed by: NURSE PRACTITIONER

## 2020-09-01 PROCEDURE — 1159F PR MEDICATION LIST DOCUMENTED IN MEDICAL RECORD: ICD-10-PCS | Mod: S$GLB,,, | Performed by: NURSE PRACTITIONER

## 2020-09-01 RX ORDER — CLOTRIMAZOLE AND BETAMETHASONE DIPROPIONATE 10; .64 MG/G; MG/G
CREAM TOPICAL
Qty: 15 G | Refills: 1 | Status: SHIPPED | OUTPATIENT
Start: 2020-09-01 | End: 2021-03-05 | Stop reason: SDUPTHER

## 2020-09-01 NOTE — PROGRESS NOTES
CC: Vaginal Discharge    Ross Rodriguez is a 75 y.o. female  presents with complaint of vaginal discharge for 1 week.  She reports itching. She denies odor.  She states the discharge is white and milky.  Alleviating factors: None. No new sexual partners, she is not sexually active.      ROS:  GENERAL: No fever, chills, fatigability or weight loss.  VULVAR: No pain, no lesions and no itching.  VAGINAL: No relaxation, no itching, no discharge, no abnormal bleeding and no lesions.  ABDOMEN: No abdominal pain. Denies nausea. Denies vomiting. No diarrhea. No constipation  BREAST: Denies pain. No lumps. No discharge.  URINARY: No incontinence, no nocturia, no frequency and no dysuria.  CARDIOVASCULAR: No chest pain. No shortness of breath. No leg cramps.  NEUROLOGICAL: No headaches. No vision changes.    PHYSICAL EXAM:  VULVA: normal appearing vulva with no masses, tenderness or lesions. Atrophy.  VAGINA: normal appearing vagina with erythema, atrophy, and moderate amount of clear/white discharge.  CERVIX: normal appearing cervix without discharge or lesions. Atrophic  UTERUS: uterus is normal size, shape, consistency and nontender   ADNEXA: normal adnexa in size, nontender and no masses    ASSESSMENT and PLAN:    ICD-10-CM ICD-9-CM    1. Acute vaginitis  N76.0 616.10 Vaginosis Screen by DNA Probe      clotrimazole-betamethasone 1-0.05% (LOTRISONE) cream     Affirm  Discussed causation including yeast, BV, or atrophic vaginitis. Lotrisone for treatment of external symptoms.     Patient was counseled today on vaginitis prevention including :  a. avoiding feminine products such as deoderant soaps, body wash, bubble bath, douches, scented toilet paper, deoderant tampons or pads, feminine wipes, chronic pad use, etc.  b. avoiding other vulvovaginal irritants such as long hot baths, humidity, tight, synthetic clothing, chlorine and sitting around in wet bathing suits  c. wearing cotton underwear, avoiding thong  underwear and no underwear to bed  d. taking showers instead of baths and use a hair dryer on cool setting afterwards to dry  e. wearing cotton to exercise and shower immediately after exercise and change clothes  f. using polyurethane condoms without spermicide if sexually active and symptoms are triggered by intercourse    FOLLOW UP: PRN lack of improvement.      Penny Montgomery, FNP-C

## 2020-09-02 ENCOUNTER — PATIENT MESSAGE (OUTPATIENT)
Dept: OBSTETRICS AND GYNECOLOGY | Facility: CLINIC | Age: 75
End: 2020-09-02

## 2020-09-02 DIAGNOSIS — N95.2 ATROPHIC VAGINITIS: Primary | ICD-10-CM

## 2020-09-02 LAB
CANDIDA RRNA VAG QL PROBE: NEGATIVE
G VAGINALIS RRNA GENITAL QL PROBE: NEGATIVE
T VAGINALIS RRNA GENITAL QL PROBE: NEGATIVE

## 2020-09-02 RX ORDER — ESTRADIOL 0.1 MG/G
1 CREAM VAGINAL
Qty: 42.5 G | Refills: 1 | Status: SHIPPED | OUTPATIENT
Start: 2020-09-03 | End: 2020-09-11 | Stop reason: SDUPTHER

## 2020-09-08 ENCOUNTER — TELEPHONE (OUTPATIENT)
Dept: ORTHOPEDICS | Facility: CLINIC | Age: 75
End: 2020-09-08

## 2020-09-08 ENCOUNTER — OFFICE VISIT (OUTPATIENT)
Dept: INTERNAL MEDICINE | Facility: CLINIC | Age: 75
End: 2020-09-08
Payer: MEDICARE

## 2020-09-08 ENCOUNTER — PATIENT OUTREACH (OUTPATIENT)
Dept: ADMINISTRATIVE | Facility: OTHER | Age: 75
End: 2020-09-08

## 2020-09-08 VITALS
WEIGHT: 160.06 LBS | SYSTOLIC BLOOD PRESSURE: 122 MMHG | DIASTOLIC BLOOD PRESSURE: 58 MMHG | OXYGEN SATURATION: 96 % | BODY MASS INDEX: 31.42 KG/M2 | HEART RATE: 68 BPM | HEIGHT: 60 IN

## 2020-09-08 DIAGNOSIS — G89.29 CHRONIC PAIN OF BOTH SHOULDERS: ICD-10-CM

## 2020-09-08 DIAGNOSIS — M81.0 AGE-RELATED OSTEOPOROSIS WITHOUT CURRENT PATHOLOGICAL FRACTURE: ICD-10-CM

## 2020-09-08 DIAGNOSIS — E78.2 MIXED HYPERLIPIDEMIA: ICD-10-CM

## 2020-09-08 DIAGNOSIS — J84.9 ILD (INTERSTITIAL LUNG DISEASE): ICD-10-CM

## 2020-09-08 DIAGNOSIS — Z00.00 ANNUAL PHYSICAL EXAM: Primary | ICD-10-CM

## 2020-09-08 DIAGNOSIS — K76.89 LIVER CYST: ICD-10-CM

## 2020-09-08 DIAGNOSIS — M25.512 CHRONIC PAIN OF BOTH SHOULDERS: ICD-10-CM

## 2020-09-08 DIAGNOSIS — I77.819 AORTIC ECTASIA: ICD-10-CM

## 2020-09-08 DIAGNOSIS — I70.0 AORTIC ATHEROSCLEROSIS: ICD-10-CM

## 2020-09-08 DIAGNOSIS — M25.511 CHRONIC PAIN OF BOTH SHOULDERS: ICD-10-CM

## 2020-09-08 PROCEDURE — 99499 RISK ADDL DX/OHS AUDIT: ICD-10-PCS | Mod: S$GLB,,, | Performed by: INTERNAL MEDICINE

## 2020-09-08 PROCEDURE — 99999 PR PBB SHADOW E&M-EST. PATIENT-LVL V: CPT | Mod: PBBFAC,,, | Performed by: INTERNAL MEDICINE

## 2020-09-08 PROCEDURE — 99499 UNLISTED E&M SERVICE: CPT | Mod: S$GLB,,, | Performed by: INTERNAL MEDICINE

## 2020-09-08 PROCEDURE — 99999 PR PBB SHADOW E&M-EST. PATIENT-LVL V: ICD-10-PCS | Mod: PBBFAC,,, | Performed by: INTERNAL MEDICINE

## 2020-09-08 PROCEDURE — 99214 OFFICE O/P EST MOD 30 MIN: CPT | Mod: S$GLB,,, | Performed by: INTERNAL MEDICINE

## 2020-09-08 PROCEDURE — 99214 PR OFFICE/OUTPT VISIT, EST, LEVL IV, 30-39 MIN: ICD-10-PCS | Mod: S$GLB,,, | Performed by: INTERNAL MEDICINE

## 2020-09-08 NOTE — PATIENT INSTRUCTIONS
Shoulder Pain with Uncertain Cause  Shoulder pain can have many causes. Pain often comes from the structures that surround the shoulder joint. These are the joint capsule, ligaments, tendons, muscles, and bursa. Pain can also come from cartilage in the joint. Cartilage can become worn out or injured. Its important to know whats causing your pain so the healthcare provider can use the correct treatment. But sometimes its difficult to find the exact cause of shoulder pain. You may need to see a specialist (orthopedist). You may also need special tests such as a CT scan or MRI. The provider may need to use special tools to look inside the joint (arthroscopy).  Shoulder pain can be treated with a sling or a device that keeps your shoulder from moving. You can take an anti-inflammatory medicine such as ibuprofen to ease pain. You may need to do special shoulder exercises. Follow up with a specialist if the pain is severe or doesnt go away after a few weeks.  Home care  Follow these tips when caring for yourself at home:  · If a sling was given to you, leave it in place for the time advised by your healthcare provider. If you arent sure how long to wear it, ask for advice. If the sling becomes loose, adjust it so that your forearm is level with the ground. Your shoulder should feel well supported.  · Put an ice pack on the injured area for 20 minutes every 1 to 2 hours the first day. You can make your own ice pack by putting ice cubes in a plastic bag. Wrap the bag in a thin towel. Continue with ice packs 3 to 4 times a day for the next 2 days. Then use the pack as needed to ease pain and swelling.  · You may use acetaminophen or ibuprofen to control pain, unless another pain medicine was prescribed. If you have chronic liver or kidney disease, talk with your healthcare provider before using these medicines. Also talk with your provider if youve ever had a stomach ulcer or GI bleeding.  · Shoulder pain may seem  worse at night, when there is less to distract you from the pain. If you sleep on your side, try to keep weight off your painful shoulder. Propping pillows behind you may stop you from rolling over onto that shoulder during sleep.   · Shoulder and elbow joints can become stiff if left in a sling for too long. You should start range of motion exercises about 7 to 10 days after the injury. Talk with your provider to find out what type of exercises to do and how soon to start.  · You can take the sling off to shower or bathe.  Follow-up care  Follow up with your healthcare provider if you dont start to get better in the next 5 days.  When to seek medical advice  Call your healthcare provider right away if any of these occur:  · Pain or swelling gets worse or continues for more than a few days  · Your hand or fingers become cold, blue, numb, or tingly  · Large amount of bruising on your shoulder or upper arm  · Difficulty moving your hand or fingers  · Weakness in your hand or fingers  · Your shoulder becomes stiff  · It feels like your shoulder is popping out  · You are less able to do your daily activities  Date Last Reviewed: 10/1/2016  © 8724-8511 [employer]. [address] [place] [address], [place] [address]. All rights reserved. This information is not intended as a substitute for professional medical care. Always follow your healthcare professional's instructions.

## 2020-09-08 NOTE — TELEPHONE ENCOUNTER
Spoke with pt.     Pt reports she injured her shoulder 20 years ago.  It still hurts.  Pt scheduled to see Dr Alvarenga Thursday as requested

## 2020-09-08 NOTE — TELEPHONE ENCOUNTER
Left a voice mail for pt to return my call regarding an appointment with orthopedics for bilateral shoulder pain.

## 2020-09-08 NOTE — TELEPHONE ENCOUNTER
----- Message from Simin Barbour sent at 9/8/2020  3:13 PM CDT -----  Contact: pt  Attn Julita    Please call pt at [phone]    Patient is returning your call regarding a future appt    Thank you

## 2020-09-08 NOTE — PROGRESS NOTES
Subjective:       Patient ID: Ross Rodriguez is a 75 y.o. female.    Chief Complaint: Annual Exam    Annual exam    Some ongoing shoulder issues, seeing Ortho.    No syncope, chest pain, pressure, tightness or shortness of breath.    Following in Cardiothoracic surgery.  Had a CT of the chest in August, stable aneurysmal dilatation.  Some mitral calcification.  No symptoms.  Alarm symptoms reviewed.    Due for flu and shingles vaccines, reviewed again.    Patient Active Problem List:     Gastroesophageal reflux disease without esophagitis     Nuclear sclerosis - Both Eyes     Tinnitus     Diverticulosis of small and large intestine see CT scan 7/15     Mixed hyperlipidemia     Aortic ectasia: ascending aorta 4.7 cm 2016; stable 2020 follows in CTS     Lung cyst: R side see CT 2009     Liver cyst: see CT 2009; NOT seen on u/s 2018     Primary insomnia     Age-related osteoporosis; see DEXA 2019     Aortic atherosclerosis: see CT scan 7/18; stable 8/20     ILD (interstitial lung disease): see CT 2019, also 2020      Review of Systems   Constitutional: Negative for activity change, appetite change, chills, fatigue and fever.   HENT: Negative for congestion, hearing loss, sinus pressure and sore throat.    Eyes: Negative for visual disturbance.   Respiratory: Negative for apnea, cough, shortness of breath and wheezing.         History of interstitial lung disease, no current symptoms   Cardiovascular: Negative for chest pain, palpitations and leg swelling.   Gastrointestinal: Negative for abdominal distention, abdominal pain, constipation, diarrhea, nausea and vomiting.   Genitourinary: Negative for dysuria, frequency, hematuria and vaginal bleeding.   Musculoskeletal: Positive for arthralgias. Negative for gait problem, joint swelling and myalgias.        Shoulder pain left, has had for several months to years but seems to be getting worse.  Minimal on right  No morning stiffness  No weakness  No trauma  No fever,  chills, sweats or weight loss   Skin: Negative for rash.   Neurological: Negative for dizziness, weakness, light-headedness and headaches.   Hematological: Negative for adenopathy. Does not bruise/bleed easily.   Psychiatric/Behavioral: Negative for confusion, hallucinations, sleep disturbance and suicidal ideas.       Objective:      Physical Exam  Vitals signs and nursing note reviewed.   Constitutional:       Appearance: She is well-developed.   HENT:      Head: Normocephalic and atraumatic.      Right Ear: External ear normal.      Left Ear: External ear normal.      Nose: Nose normal.      Mouth/Throat:      Pharynx: No oropharyngeal exudate.   Eyes:      General: No scleral icterus.     Extraocular Movements: Extraocular movements intact.      Conjunctiva/sclera: Conjunctivae normal.   Neck:      Musculoskeletal: Normal range of motion and neck supple.      Thyroid: No thyromegaly.      Vascular: No JVD.   Cardiovascular:      Rate and Rhythm: Normal rate and regular rhythm.      Heart sounds: Normal heart sounds. No murmur. No gallop.    Pulmonary:      Effort: Pulmonary effort is normal. No respiratory distress.      Breath sounds: Normal breath sounds. No wheezing.   Abdominal:      General: Bowel sounds are normal. There is no distension.      Palpations: Abdomen is soft. There is no mass.      Tenderness: There is no abdominal tenderness. There is no guarding or rebound.   Musculoskeletal: Normal range of motion.         General: No tenderness.      Comments: Slight decreased range of motion left shoulder   Lymphadenopathy:      Cervical: No cervical adenopathy.   Skin:     General: Skin is warm.      Findings: No erythema or rash.   Neurological:      Mental Status: She is alert and oriented to person, place, and time.      Cranial Nerves: No cranial nerve deficit.      Coordination: Coordination normal.   Psychiatric:         Behavior: Behavior normal.         Thought Content: Thought content normal.          Judgment: Judgment normal.         Assessment:       1. Annual physical exam    2. ILD (interstitial lung disease): see CT 2019, also 2020    3. Mixed hyperlipidemia    4. Aortic atherosclerosis: see CT scan 7/18; stable 8/20    5. Aortic ectasia: ascending aorta 4.7 cm 2016; stable 2020 follows in CTS    6. Age-related osteoporosis; see DEXA 2019    7. Liver cyst: see CT 2009; NOT seen on u/s 2018    8. Chronic pain of both shoulders        Plan:         Ross was seen today for annual exam.    Diagnoses and all orders for this visit:    Annual physical exam    ILD (interstitial lung disease): see CT 2019, also 2020:  No symptoms    Mixed hyperlipidemia; continue regimen    Aortic atherosclerosis: see CT scan 7/18; stable 8/20    Aortic ectasia: ascending aorta 4.7 cm 2016; stable 2020 follows in CTS    Age-related osteoporosis; see DEXA 2019; treatment issues reviewed.  She has been on Prolia.  Exercise and fall prevention reviewed.  Recheck 2021    Liver cyst: see CT 2009; NOT seen on u/s 2018; unclear significance, no cause for concern currently    Chronic pain of both shoulders  -     X-Ray Shoulder Trauma 3 view Right; Future  -     X-Ray Shoulder Trauma 3 view Left; Future  -     Ambulatory referral/consult to Orthopedics; Future    Flu shot and shingles vaccines recommended  I will review all studies and determine further tx depending on findings

## 2020-09-09 ENCOUNTER — HOSPITAL ENCOUNTER (OUTPATIENT)
Dept: RADIOLOGY | Facility: HOSPITAL | Age: 75
Discharge: HOME | End: 2020-09-09
Attending: INTERNAL MEDICINE
Payer: MEDICARE

## 2020-09-09 ENCOUNTER — TELEPHONE (OUTPATIENT)
Dept: SPORTS MEDICINE | Facility: CLINIC | Age: 75
End: 2020-09-09

## 2020-09-09 DIAGNOSIS — M25.512 CHRONIC PAIN OF BOTH SHOULDERS: ICD-10-CM

## 2020-09-09 DIAGNOSIS — G89.29 CHRONIC PAIN OF BOTH SHOULDERS: ICD-10-CM

## 2020-09-09 DIAGNOSIS — M25.511 CHRONIC PAIN OF BOTH SHOULDERS: ICD-10-CM

## 2020-09-09 PROCEDURE — 73030 X-RAY EXAM OF SHOULDER: CPT | Mod: TC,FY,PO,LT

## 2020-09-09 PROCEDURE — 73030 X-RAY EXAM OF SHOULDER: CPT | Mod: TC,FY,PO,RT

## 2020-09-09 NOTE — TELEPHONE ENCOUNTER
LVM if we can reschedule her appointment for tomorrow morning. Sorry for any conversance you can call the office at [phone] and ask for Magy or Brooks.

## 2020-09-10 ENCOUNTER — TELEPHONE (OUTPATIENT)
Dept: ORTHOPEDICS | Facility: CLINIC | Age: 75
End: 2020-09-10

## 2020-09-10 ENCOUNTER — TELEPHONE (OUTPATIENT)
Dept: OBSTETRICS AND GYNECOLOGY | Facility: CLINIC | Age: 75
End: 2020-09-10

## 2020-09-10 NOTE — TELEPHONE ENCOUNTER
Patient contacted to discuss Estrace cream- returned to [facility] script- cost $900/year. Will eval for lower tier estrogen cream alternative. Liver cyst was discussed with PCP, reported resolution on US in 2018.

## 2020-09-11 DIAGNOSIS — N95.2 ATROPHIC VAGINITIS: ICD-10-CM

## 2020-09-11 RX ORDER — ESTRADIOL 0.1 MG/G
1 CREAM VAGINAL
Qty: 42.5 G | Refills: 1 | Status: SHIPPED | OUTPATIENT
Start: 2020-09-14 | End: 2021-09-20

## 2020-09-21 NOTE — PROGRESS NOTES
CC: BILATERAL shoulder pain     75 y.o. Female presents as a new patient to me. She is retired. RHD with complaint of bilateral shoulder pain, left > right for approximately 20 years. Traumatic onset after 2 separate falls approximately 20 years ago. Pain localizes to the lateral shoulder at the rotator cuff insertion on the left with mild diffuse pain on the right. Worse with resisted shoulder motion. Pain is disruptive to sleep at night. Patient states that she saw an orthopedic surgeon after her falls back in the early 2000s and was told that she had a frozen shoulder on the right and a strain on the left.  The right shoulder did well with conservative treatment.  The doctor at that time recommended a cortisone injection on the left which was provided.  She also did physical therapy for both shoulders.  She states that this alleviated her pain bilaterally and she did not have pain until approximately 5 months ago without a new injury.  She has no other complaints at this time. Denies neck pain or radicular symptoms. Treatment thus far has included activity modifications, rest, and oral medication.  Here today to discuss diagnosis and treatment options.     PMHx notable for aortic atherosclerosis.   Negative for tobacco.   Negative for diabetes.     PAST MEDICAL HISTORY:   Past Medical History:   Diagnosis Date    Allergy     Anxiety     Aortic atherosclerosis: see CT scan 7/18 8/21/2018    Arthritis     Cataract     Depression     Diverticulosis 7/21/2015    GERD (gastroesophageal reflux disease)     Hyperlipidemia     Joint pain     Liver cyst: see CT 2009 6/21/2016    Lower back pain 7/29/2019    Lung cyst: R side see CT 2009 6/21/2016    Nuclear sclerosis - Both Eyes 2/14/2014    Osteoporosis     Primary insomnia 6/21/2016    Primary insomnia 6/21/2016     PAST SURGICAL HISTORY:  Past Surgical History:   Procedure Laterality Date    CHOLECYSTECTOMY      DILATION AND CURETTAGE OF UTERUS       WISDOM TOOTH EXTRACTION       FAMILY HISTORY:  Family History   Problem Relation Age of Onset    Stroke Mother     Heart disease Mother         rheumatic fever    Cancer Father         Bladder    COPD Father     Heart disease Father         CHF    Kidney disease Father     Diabetes Father     Hearing loss Father     Thrombosis Maternal Grandfather     Cancer Paternal Grandmother         stomach    Mary Alice's disease Paternal Grandfather     No Known Problems Son     Skin cancer Paternal Aunt     Melanoma Neg Hx     Amblyopia Neg Hx     Blindness Neg Hx     Cataracts Neg Hx     Glaucoma Neg Hx     Hypertension Neg Hx     Macular degeneration Neg Hx     Retinal detachment Neg Hx     Strabismus Neg Hx     Thyroid disease Neg Hx      MEDICATIONS:    Current Outpatient Medications:     acetaminophen (TYLENOL) 500 mg Cap, , Disp: , Rfl:     calcium citrate-vitamin D2 1,500-200 mg-unit Tab, , Disp: , Rfl:     clotrimazole-betamethasone 1-0.05% (LOTRISONE) cream, Apply to affected area 2 times daily, Disp: 15 g, Rfl: 1    CRESTOR 10 mg tablet, , Disp: , Rfl:     denosumab (PROLIA) 60 mg/mL Syrg, Inject 60 mg into the skin every 6 (six) months., Disp: , Rfl:     estradioL (ESTRACE) 0.01 % (0.1 mg/gram) vaginal cream, Place 1 g vaginally twice a week., Disp: 42.5 g, Rfl: 1    fish oil-omega-3 fatty acids 300-1,000 mg capsule, Take 2 g by mouth once daily. Takes 2 times daily, Disp: , Rfl:     FLAXSEED ORAL, Take 1 application by mouth once daily at 6am. Add seeds to oatmeal, Disp: , Rfl:     LORazepam (ATIVAN) 0.5 MG tablet, Take 1 tablet (0.5 mg total) by mouth as needed for Anxiety (May take 1-2 x weekly for anxiety or for travel)., Disp: 30 tablet, Rfl: 0    multivitamin (THERAGRAN) per tablet, Take 1 tablet by mouth once daily., Disp: , Rfl:     ALLERGIES:  Review of patient's allergies indicates:   Allergen Reactions    Fosamax [alendronate] Nausea Only    Nsaids (non-steroidal  anti-inflammatory drug) Hives    Sulfa (sulfonamide antibiotics) Hives     REVIEW OF SYSTEMS:  Constitution: Negative. Negative for chills, fever and night sweats.    Hematologic/Lymphatic: Negative for bleeding problem. Does not bruise/bleed easily.   Skin: Negative for dry skin, itching and rash.   Musculoskeletal: Negative for falls. Positive for bilateral shoulder pain and muscle weakness.     All other review of symptoms were reviewed and found to be noncontributory.     PHYSICAL EXAMINATION:  Vitals:  /76   Pulse 81   Ht 5' (1.524 m)   Wt 72.6 kg (160 lb)   BMI 31.25 kg/m²    General: Well-developed well-nourished 75 y.o. femalein no acute distress   Cardiovascular: Regular rhythm by palpation of distal pulse, normal color and temperature, no concerning varicosities on symptomatic side   Lungs: No labored breathing or wheezing appreciated   Neuro: Alert and oriented ×3   Psychiatric: well oriented to person, place and time, demonstrates normal mood and affect   Skin: No rashes, lesions or ulcers, normal temperature, turgor, and texture on uninvolved extremity    Ortho/SPM Exam  Examination of the left shoulder demonstrates active forward elevation to 150, ER with arm at side to 50, IR to T7. Passive FE to 160, ER to 60. Prominent tenderness along the proximal biceps tendon. Negative AC tenderness. 4/5 resisted supraspinatus testing. 5/5 resisted infraspinatus testing. Negative belly press test. Stable shoulder. No midline neck tenderness. Negative Spurling's maneuver. Minimal pain over Codman's point    Examination of the right shoulder demonstrates active forward elevation to 170, ER with arm at side to 50, IR to T5. Negative AC, proximal biceps tenderness. 5/5 resisted supraspinatus testing. 5/5 resisted infraspinatus testing. Negative belly press test. Stable shoulder. No midline neck tenderness. Negative Spurling's maneuver. No pain over Codman's point    IMAGING:  Xrays including AP, Outlet and  Axillary Lateral of bilateral shoulders are ordered / images reviewed by me:   Sclerosis over the greater tuberosity and the acromion undersurface more prominent on the right side.  Perhaps subtle proximal humeral head migration on Grashey view on the right.  Bilateral AC joint DJD.      ASSESSMENT:      ICD-10-CM ICD-9-CM   1. Chronic pain of both shoulders  M25.511 719.41    G89.29 338.29    M25.512      AC joint arthropathy    PLAN:     -Findings and treatment options were discussed with the patient.  No recent conservative treatment.  Conservative care recommended.  -CSI provided to left shoulder subacromial space.  The patient reported very good immediate relief.  -Rx provided for PT.  Focus on cuff strengthening and scapular stabilization  -RTC if no improvement after 6-8 weeks.  If pain is persistent or recurrent, next step would be MRI.  -All questions answered      Large Joint Aspiration/Injection: L subacromial bursa    Date/Time: 9/24/2020 3:30 PM  Performed by: LISA Alvarenga MD  Authorized by: LISA Alvarenga MD     Consent Done?:  Yes (Verbal)  Indications:  Pain  Site marked: the procedure site was marked    Timeout: prior to procedure the correct patient, procedure, and site was verified    Prep: patient was prepped and draped in usual sterile fashion      Local anesthesia used?: Yes    Local anesthetic:  Co-phenylcaine spray (0.2% Naropin)  Anesthetic total (ml):  4      Details:  Needle Size:  22 G  Ultrasonic Guidance for needle placement?: No    Approach:  Posterior  Location:  Shoulder  Site:  L subacromial bursa  Medications:  40 mg triamcinolone acetonide 40 mg/mL  Patient tolerance:  Patient tolerated the procedure well with no immediate complications

## 2020-09-24 ENCOUNTER — HOSPITAL ENCOUNTER (OUTPATIENT)
Dept: RADIOLOGY | Facility: HOSPITAL | Age: 75
Discharge: HOME | End: 2020-09-24
Attending: ORTHOPAEDIC SURGERY
Payer: MEDICARE

## 2020-09-24 ENCOUNTER — OFFICE VISIT (OUTPATIENT)
Dept: SPORTS MEDICINE | Facility: CLINIC | Age: 75
End: 2020-09-24
Payer: MEDICARE

## 2020-09-24 VITALS
BODY MASS INDEX: 31.41 KG/M2 | WEIGHT: 160 LBS | HEART RATE: 81 BPM | DIASTOLIC BLOOD PRESSURE: 76 MMHG | SYSTOLIC BLOOD PRESSURE: 135 MMHG | HEIGHT: 60 IN

## 2020-09-24 DIAGNOSIS — M25.512 BILATERAL SHOULDER PAIN, UNSPECIFIED CHRONICITY: ICD-10-CM

## 2020-09-24 DIAGNOSIS — M25.512 CHRONIC PAIN OF BOTH SHOULDERS: Primary | ICD-10-CM

## 2020-09-24 DIAGNOSIS — M25.511 CHRONIC PAIN OF BOTH SHOULDERS: Primary | ICD-10-CM

## 2020-09-24 DIAGNOSIS — M25.511 BILATERAL SHOULDER PAIN, UNSPECIFIED CHRONICITY: ICD-10-CM

## 2020-09-24 DIAGNOSIS — G89.29 CHRONIC PAIN OF BOTH SHOULDERS: Primary | ICD-10-CM

## 2020-09-24 PROCEDURE — 73030 X-RAY EXAM OF SHOULDER: CPT | Mod: 26,50,, | Performed by: RADIOLOGY

## 2020-09-24 PROCEDURE — 20610 LARGE JOINT ASPIRATION/INJECTION: L SUBACROMIAL BURSA: ICD-10-PCS | Mod: LT,S$GLB,, | Performed by: ORTHOPAEDIC SURGERY

## 2020-09-24 PROCEDURE — 99204 OFFICE O/P NEW MOD 45 MIN: CPT | Mod: 25,S$GLB,, | Performed by: ORTHOPAEDIC SURGERY

## 2020-09-24 PROCEDURE — 73030 X-RAY EXAM OF SHOULDER: CPT | Mod: TC,50

## 2020-09-24 PROCEDURE — 20610 DRAIN/INJ JOINT/BURSA W/O US: CPT | Mod: LT,S$GLB,, | Performed by: ORTHOPAEDIC SURGERY

## 2020-09-24 PROCEDURE — 1159F MED LIST DOCD IN RCRD: CPT | Mod: S$GLB,,, | Performed by: ORTHOPAEDIC SURGERY

## 2020-09-24 PROCEDURE — 99999 PR PBB SHADOW E&M-EST. PATIENT-LVL III: CPT | Mod: PBBFAC,,, | Performed by: ORTHOPAEDIC SURGERY

## 2020-09-24 PROCEDURE — 73030 XR SHOULDER COMPLETE 2 OR MORE VIEWS BILATERAL: ICD-10-PCS | Mod: 26,50,, | Performed by: RADIOLOGY

## 2020-09-24 PROCEDURE — 1101F PR PT FALLS ASSESS DOC 0-1 FALLS W/OUT INJ PAST YR: ICD-10-PCS | Mod: CPTII,S$GLB,, | Performed by: ORTHOPAEDIC SURGERY

## 2020-09-24 PROCEDURE — 1101F PT FALLS ASSESS-DOCD LE1/YR: CPT | Mod: CPTII,S$GLB,, | Performed by: ORTHOPAEDIC SURGERY

## 2020-09-24 PROCEDURE — 99999 PR PBB SHADOW E&M-EST. PATIENT-LVL III: ICD-10-PCS | Mod: PBBFAC,,, | Performed by: ORTHOPAEDIC SURGERY

## 2020-09-24 PROCEDURE — 1159F PR MEDICATION LIST DOCUMENTED IN MEDICAL RECORD: ICD-10-PCS | Mod: S$GLB,,, | Performed by: ORTHOPAEDIC SURGERY

## 2020-09-24 PROCEDURE — 99204 PR OFFICE/OUTPT VISIT, NEW, LEVL IV, 45-59 MIN: ICD-10-PCS | Mod: 25,S$GLB,, | Performed by: ORTHOPAEDIC SURGERY

## 2020-09-24 RX ADMIN — TRIAMCINOLONE ACETONIDE 40 MG: 40 INJECTION, SUSPENSION INTRA-ARTICULAR; INTRAMUSCULAR at 03:09

## 2020-10-05 ENCOUNTER — CLINICAL SUPPORT (OUTPATIENT)
Dept: REHABILITATION | Facility: HOSPITAL | Age: 75
End: 2020-10-05
Attending: STUDENT IN AN ORGANIZED HEALTH CARE EDUCATION/TRAINING PROGRAM
Payer: MEDICARE

## 2020-10-05 DIAGNOSIS — M25.612 DECREASED ROM OF LEFT SHOULDER: ICD-10-CM

## 2020-10-05 DIAGNOSIS — M25.512 BILATERAL SHOULDER PAIN, UNSPECIFIED CHRONICITY: ICD-10-CM

## 2020-10-05 DIAGNOSIS — M25.511 CHRONIC PAIN OF BOTH SHOULDERS: ICD-10-CM

## 2020-10-05 DIAGNOSIS — M25.511 BILATERAL SHOULDER PAIN, UNSPECIFIED CHRONICITY: ICD-10-CM

## 2020-10-05 DIAGNOSIS — M25.512 CHRONIC PAIN OF BOTH SHOULDERS: ICD-10-CM

## 2020-10-05 DIAGNOSIS — G89.29 CHRONIC PAIN OF BOTH SHOULDERS: ICD-10-CM

## 2020-10-05 DIAGNOSIS — M62.81 MUSCLE WEAKNESS OF LEFT ARM: ICD-10-CM

## 2020-10-05 PROCEDURE — 97110 THERAPEUTIC EXERCISES: CPT | Mod: PO

## 2020-10-05 PROCEDURE — 97161 PT EVAL LOW COMPLEX 20 MIN: CPT | Mod: PO

## 2020-10-05 NOTE — PLAN OF CARE
[facility]  Physical Therapy Initial Evaluation    Date: 10/5/2020   Name: Ross Rodriguez  Clinic Number: [id number]    Therapy Diagnosis:   Encounter Diagnoses   Name Primary?    Bilateral shoulder pain, unspecified chronicity     Chronic pain of both shoulders     Decreased ROM of left shoulder     Muscle weakness of left arm      Physician: Rambo Franks, *    Physician Orders: PT Eval and Treat   Medical Diagnosis from Referral: M25.511,M25.512 (ICD-10-CM) - Bilateral shoulder pain, unspecified chronicity  Evaluation Date: 10/5/2020  Authorization Period Expiration: 10/19/2020  Plan of Care Expiration: 12/5/2020  Visit # / Visits authorized: 1/ 6    Time In: 1:45  Time Out: 2:30  Total Appointment Time (timed & untimed codes): 45 minutes    Precautions: Standard    Subjective   Date of onset: chronic  History of current condition - Ross reports: history of bilateral shoulder pain with left shoulder pain increasing about 3 months ago.  Pt reports her shoulder pain has gradually got worse.  Pt receive injection which decreased pain to her normal pain level.  Pt has increased pain with reaching, quick movements, pushing, and pulling activities.       Medical History:   Past Medical History:   Diagnosis Date    Allergy     Anxiety     Aortic atherosclerosis: see CT scan 7/18 8/21/2018    Arthritis     Cataract     Depression     Diverticulosis 7/21/2015    GERD (gastroesophageal reflux disease)     Hyperlipidemia     Joint pain     Liver cyst: see CT 2009 6/21/2016    Lower back pain 7/29/2019    Lung cyst: R side see CT 2009 6/21/2016    Nuclear sclerosis - Both Eyes 2/14/2014    Osteoporosis     Primary insomnia 6/21/2016    Primary insomnia 6/21/2016       Surgical History:   Ross Rodriguez  has a past surgical history that includes Cholecystectomy; Dilation and curettage of uterus; and [place] tooth extraction.    Medications:   Ross has a current  medication list which includes the following prescription(s): acetaminophen, calcium citrate-vitamin d2, clotrimazole-betamethasone 1-0.05%, crestor, denosumab, estradiol, fish oil-omega-3 fatty acids, flaxseed, lorazepam, and multivitamin.    Allergies:   Review of patient's allergies indicates:   Allergen Reactions    Fosamax [alendronate] Nausea Only    Nsaids (non-steroidal anti-inflammatory drug) Hives    Sulfa (sulfonamide antibiotics) Hives        Imaging, x-ray: 9/24/2020  FINDINGS:  Right shoulder: No evidence of acute fracture or dislocation.  Acromioclavicular joint demonstrates mild arthrosis.  Soft tissue structures are within normal limits.     Left shoulder: No evidence of acute fracture or dislocation.  Acromioclavicular joint demonstrates moderate arthrosis.  Soft tissue structures are within normal limits.    Prior Therapy: yes  Social History:  lives alone  Occupation: not working  Prior Level of Function: independent  Current Level of Function: independent    Pain:  Current 3/10, worst 8/10, best 3/10   Location: bilateral shoulder, L>R  Description: constant nagging  Aggravating Factors: reaching, lifting, pushing, pulling  Easing Factors: Tylenol    Patients goals: return to 100%    Objective     Observation: pt is a 75 year old female that presents to therapy in no apparent distress    Posture: rounded shoulder posture      Passive Range of Motion:   Shoulder Left Right   Flexion 150 170   Abduction 150 170   ER at 90 70 90   IR 70 80      Active Range of Motion:   Shoulder Left Right   Flexion 150 170   Abduction 150 170   ER at 0 Reach T2 Reach T3   IR Reach L1 Reach T7     Upper Extremity Strength   (L) UE (R) UE   Shoulder flexion: 3+/5 4/5   Shoulder Abduction: 3+/5 4/5   Shoulder ER 3+/5 4/5   Shoulder IR 4-/5 4+/5   Middle Trap 3+/5 4-/5   Rhomboids 3+/5 4-/5         Joint Mobility: normal joint mobility testing of left GH joint, voluntary muscle guarding present    Palpation:  "tenderness to palpation noted in upper trapezius, levator scapulae, rhomboids, infraspinatus, teres minor, deltoids, pec major, pec minor    Sensation: intact to light touch    Flexibility: tightness in pec minor        Limitation/Restriction for FOTO Shoulder Survey    Therapist reviewed FOTO scores for Ross Rodriguez on 10/5/2020.   FOTO documents entered into [employer] - see Media section.    Limitation Score: 41%         TREATMENT   Treatment Time In: 2:20  Treatment Time Out: 2:30  Total Treatment time (time-based codes) separate from Evaluation: 10 minutes    Ross received therapeutic exercises to develop strength, ROM, flexibility and posture for 10 minutes including:    Date  10/5/2020   VISIT 1/6  10/19/2020       POC EXP. DATE 12/05/2020   VISIT AMOUNT  MEDICARE TOTAL 113.20   FACE-TO-FACE 11/5/2020   FOTO 1/5       UBE --   TABLE:    Scapular retractions 10 x 3"   Wand flexion 1 x 10   Wand ER at 45deg --   Scapular protractions --   Reverse codman's --   Side lying (L)sh:  Flexion  Abduction  ER   --  --  1 x 10                   STANDING:    Doorway stretch 1 x 20"   Functional IR stretch --   rows --   Sh. ER --   Sh. IR --           Initials MA       Home Exercises and Patient Education Provided    Education provided:   - role of PT and goals for therapy  - HEP    Written Home Exercises Provided: yes.  Exercises were reviewed and Ross was able to demonstrate them prior to the end of the session.  Ross demonstrated good  understanding of the education provided.     See EMR under Patient Instructions for exercises provided 10/5/2020.    Assessment   Ross is a 75 y.o. female referred to outpatient Physical Therapy with a medical diagnosis of bilateral shoulder pain. Patient presents to therapy with decreased ROM, decreased strength, rounded shoulder posture, tight and tender surrounding musculature, and pain limiting tolerance to ADL's.  Pt would benefit from manual therapy, UE stretching and " strengthening, scapular stabilization exercises, posture exercises, and modalities to decrease pain, improve functional mobility, and return to prior level of function.     Patient prognosis is Good.   Patientt will benefit from skilled outpatient Physical Therapy to address the deficits stated above and in the chart below, provide patient /family education, and to maximize patientt's level of independence.     Plan of care discussed with patient: Yes  Patient's spiritual, cultural and educational needs considered and patient is agreeable to the plan of care and goals as stated below:     Anticipated Barriers for therapy: none    Medical Necessity is demonstrated by the following  History  Co-morbidities and personal factors that may impact the plan of care Co-morbidities:   anxiety, depression and high BMI    Personal Factors:   age  coping style     moderate   Examination  Body Structures and Functions, activity limitations and participation restrictions that may impact the plan of care Body Regions:   upper extremities    Body Systems:    ROM  strength    Participation Restrictions:   none    Activity limitations:   Learning and applying knowledge  no deficits    General Tasks and Commands  no deficits    Communication  no deficits    Mobility  lifting and carrying objects    Self care  no deficits    Domestic Life  cooking  doing house work (cleaning house, washing dishes, laundry)    Interactions/Relationships  no deficits    Life Areas  no deficits    Community and Social Life  recreation and leisure         moderate   Clinical Presentation stable and uncomplicated low   Decision Making/ Complexity Score: low     Goals:  Short Term Goals:  4 weeks  1. Patient will be compliant with HEP to promote the independent management of current diagnosis.  2. Patient will increase left shoulder flexion to 160 degrees to improve reaching into overhead cabinets.  3. Patient will increase left shoulder functiona IR to reach  T10 for function of dressing.  4. Patient will report a decrease in complaints of left shoulder pain to 5/10 during performance of ADL's for independence of self care activities.       Long Term Goals:  8 weeks  1. Patient will increase left shoulder strength to 4+/5 to improve tolerance to normal house work.  2. Patient will increase left shoulder flexion to 170 degrees in order to place dishes in overhead cabinets independently for self care independence.   3. Patient will increase left shoulder functional IR to reach T7 for function of dressing.  4. Patient will increase scapular stabilization strength to 4+/5 to improve tolerance to normal lifting.  5. Patient will improve FOTO limitation status from 41% to 34% placing the patient in the 20-40% impaired, limited, or restricted category indicating increased functional mobility.      Plan   Plan of care Certification: 10/5/2020 to 12/5/2020.    Outpatient Physical Therapy 2 times weekly for 8 weeks to include the following interventions: Manual Therapy, Moist Heat/ Ice, Neuromuscular Re-ed, Patient Education, Therapeutic Activites, Therapeutic Exercise and IASTM, vacuum cupping, dry needling, and kinesiotaping.     Ty Trimble, PT

## 2020-10-06 RX ORDER — TRIAMCINOLONE ACETONIDE 40 MG/ML
40 INJECTION, SUSPENSION INTRA-ARTICULAR; INTRAMUSCULAR
Status: DISCONTINUED | OUTPATIENT
Start: 2020-09-24 | End: 2020-10-06 | Stop reason: HOSPADM

## 2020-10-12 ENCOUNTER — CLINICAL SUPPORT (OUTPATIENT)
Dept: REHABILITATION | Facility: HOSPITAL | Age: 75
End: 2020-10-12
Attending: STUDENT IN AN ORGANIZED HEALTH CARE EDUCATION/TRAINING PROGRAM
Payer: MEDICARE

## 2020-10-12 DIAGNOSIS — G89.29 CHRONIC PAIN OF BOTH SHOULDERS: ICD-10-CM

## 2020-10-12 DIAGNOSIS — M25.512 CHRONIC PAIN OF BOTH SHOULDERS: ICD-10-CM

## 2020-10-12 DIAGNOSIS — M62.81 MUSCLE WEAKNESS OF LEFT ARM: ICD-10-CM

## 2020-10-12 DIAGNOSIS — M25.612 DECREASED ROM OF LEFT SHOULDER: ICD-10-CM

## 2020-10-12 DIAGNOSIS — M25.511 CHRONIC PAIN OF BOTH SHOULDERS: ICD-10-CM

## 2020-10-12 PROCEDURE — 97110 THERAPEUTIC EXERCISES: CPT | Mod: PO

## 2020-10-12 NOTE — PATIENT INSTRUCTIONS
Rowing: Resisted (Sitting)        Long-sit with resistive band around feet, hands firmly holding ends. Pull elbows back.  Repeat 10 times per set. Do 3 sets per session. Do 2 sessions per day.     [email or web address]     Copyright © [employer]. All rights reserved.   Abduction (Side-Lying)        Lie on right side. Raise arm above head. Keep palm forward.  Repeat 10 times per set. Do 3 sets per session. Do 2 sessions per day.     [email or web address]     Copyright © [employer]. All rights reserved.     FLEXION: Side-Lying - Elbow Extended (Active)        Lie on right side, top arm straight. Raise arm forward and above head with elbow straight.   Complete 3 sets of 10 repetitions. Perform 2 sessions per day.    Copyright © [employer]. All rights reserved.     Progressive Resisted: External Rotation (Side-Lying)        Towel under arm, raise left forearm toward ceiling. Keep elbow bent and at side.  Repeat 10 times per set. Do 3 sets per session. Do 2 sessions per day.     [email or web address]     Copyright © [employer]. All rights reserved.

## 2020-10-16 NOTE — PROGRESS NOTES
"  Physical Therapy Treatment Note     Name: Ross Rodriguez  Clinic Number: [id number]    Therapy Diagnosis:   Encounter Diagnoses   Name Primary?    Chronic pain of both shoulders Yes    Decreased ROM of left shoulder     Muscle weakness of left arm      Physician: Rambo Franks *    Visit Date: 10/19/2020    Physician Orders: PT Eval and Treat   Medical Diagnosis from Referral: M25.511,M25.512 (ICD-10-CM) - Bilateral shoulder pain, unspecified chronicity  Evaluation Date: 10/5/2020  Authorization Period Expiration: 12/05/2020  Plan of Care Expiration: 12/05/2020  Visit # / Visits authorized: 2 / 12    Time In: 9:00 am  Time Out: 9:45 am  Total Billable Time: 45 Minutes    Precautions: Standard    Subjective     Pt reports: hurting today after carrying her purse on her L shoulder over the weekend. She described pain as dull and deep in shoulder blade and into front of shoulder.   She was compliant with home exercise program.  Response to previous treatment: no increase in soreness   Functional change: none    Pain: 5/10  Location: bilateral shoulder  L>R    Objective     Ross received therapeutic exercises to develop strength, ROM and flexibility for 45 minutes including:    Date  10/19/2020 10/12/2020 10/5/2020   VISIT 2/12 12/5/2020 2/6  10/9/2020 1/6  10/19/2020   POC EXP. DATE 12/05/2020 12/05/2020 12/05/2020   VISIT AMOUNT  MEDICARE TOTAL 90.96  295.12 90.96  204.16 113.20   FACE-TO-FACE 11/05/2020 11/05/2020 11/5/2020   FOTO 3/5 2/5 1/5           UBE 2'/2'  Fwd/back Next --   TABLE:       Scapular retractions 15 x 3" 15 x 3" 10 x 3"   Wand flexion 1 x 15 1 x 10 1 x 10   Wand ER at 45deg 1 x 15  Try 90 deg next 1 x 10 --   Scapular protractions 2 x 10 1 x 10 --   Reverse codman's 20 x ea cw/ccw 1 x 10 ea --   Side lying (L)sh:  - Flexion  - Abduction  - ER   2 x 10  2 x 10  2 x 10   1 x 10  Next?  1 x 15    --  --  1 x 10   Jerry wings 1 x 10              STANDING:       Doorway stretch 3 x 20" 5 x " "20" 1 x 20"   Functional IR stretch 10 x 10" 1 x 10 --   Rows 2 x 10 RTB 1 x 10 RTB --   Theraband:  - IR  - ER   1 x 15 RTB  1 x 10 YTB -- --                   Initials SB DEEPALI RICH       Home Exercises Provided and Patient Education Provided     Education provided:   - compliance with HEP    Written Home Exercises Provided: yes.  Exercises were reviewed and Ross was able to demonstrate them prior to the end of the session.  Ross demonstrated good  understanding of the education provided.     See EMR under Patient Instructions for exercises provided 10/12/2020.    Assessment     Ross returns to therapy with increased pain levels due to carrying her purse over the weekend on her L side, which aggravated it. Pt was able to complete all of today's activities and new exercises without increase in pain although UBE agitated her L shoulder a little bit. Pt demo'd weakness in both directions of rotations, although no pain reported. Pt struggles with scapular setting during exercises and required cueing to correct it.     Ross is progressing well towards her goals.   Pt prognosis is Good.     Pt will continue to benefit from skilled outpatient physical therapy to address the deficits listed in the problem list box on initial evaluation, provide pt/family education and to maximize pt's level of independence in the home and community environment.     Pt's spiritual, cultural and educational needs considered and pt agreeable to plan of care and goals.     Anticipated barriers to physical therapy: none    Goals:   Short Term Goals:  4 weeks  1. Patient will be compliant with HEP to promote the independent management of current diagnosis. In progress, not met  2. Patient will increase left shoulder flexion to 160 degrees to improve reaching into overhead cabinets.  In progress, not met  3. Patient will increase left shoulder functiona IR to reach T10 for function of dressing. In progress, not met  4. Patient will report " a decrease in complaints of left shoulder pain to 5/10 during performance of ADL's for independence of self care activities.  In progress, not met        Long Term Goals:  8 weeks  1. Patient will increase left shoulder strength to 4+/5 to improve tolerance to normal house work. In progress, not met  2. Patient will increase left shoulder flexion to 170 degrees in order to place dishes in overhead cabinets independently for self care independence.  In progress, not met  3. Patient will increase left shoulder functional IR to reach T7 for function of dressing. In progress, not met  4. Patient will increase scapular stabilization strength to 4+/5 to improve tolerance to normal lifting. In progress, not met  5. Patient will improve FOTO limitation status from 41% to 34% placing the patient in the 20-40% impaired, limited, or restricted category indicating increased functional mobility. In progress, not met    Plan     Increase reps with all exercises next visit. Add weight to sidelying abduction.     Cindy Farrell, PTA 1/6

## 2020-10-19 ENCOUNTER — CLINICAL SUPPORT (OUTPATIENT)
Dept: REHABILITATION | Facility: HOSPITAL | Age: 75
End: 2020-10-19
Attending: STUDENT IN AN ORGANIZED HEALTH CARE EDUCATION/TRAINING PROGRAM
Payer: MEDICARE

## 2020-10-19 DIAGNOSIS — M25.512 CHRONIC PAIN OF BOTH SHOULDERS: Primary | ICD-10-CM

## 2020-10-19 DIAGNOSIS — G89.29 CHRONIC PAIN OF BOTH SHOULDERS: Primary | ICD-10-CM

## 2020-10-19 DIAGNOSIS — M62.81 MUSCLE WEAKNESS OF LEFT ARM: ICD-10-CM

## 2020-10-19 DIAGNOSIS — M25.511 CHRONIC PAIN OF BOTH SHOULDERS: Primary | ICD-10-CM

## 2020-10-19 DIAGNOSIS — M25.612 DECREASED ROM OF LEFT SHOULDER: ICD-10-CM

## 2020-10-19 PROCEDURE — 97110 THERAPEUTIC EXERCISES: CPT | Mod: PO,CQ
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
## 2020-10-27 ENCOUNTER — OFFICE VISIT (OUTPATIENT)
Dept: ENDOCRINOLOGY | Facility: CLINIC | Age: 75
End: 2020-10-27
Payer: MEDICARE

## 2020-10-27 VITALS
WEIGHT: 158.63 LBS | BODY MASS INDEX: 31.14 KG/M2 | HEIGHT: 60 IN | SYSTOLIC BLOOD PRESSURE: 128 MMHG | DIASTOLIC BLOOD PRESSURE: 72 MMHG

## 2020-10-27 DIAGNOSIS — M81.0 AGE-RELATED OSTEOPOROSIS WITHOUT CURRENT PATHOLOGICAL FRACTURE: Primary | ICD-10-CM

## 2020-10-27 DIAGNOSIS — K21.9 GASTROESOPHAGEAL REFLUX DISEASE WITHOUT ESOPHAGITIS: ICD-10-CM

## 2020-10-27 DIAGNOSIS — E78.2 MIXED HYPERLIPIDEMIA: ICD-10-CM

## 2020-10-27 PROCEDURE — 1101F PR PT FALLS ASSESS DOC 0-1 FALLS W/OUT INJ PAST YR: ICD-10-PCS | Mod: CPTII,S$GLB,, | Performed by: NURSE PRACTITIONER

## 2020-10-27 PROCEDURE — 99999 PR PBB SHADOW E&M-EST. PATIENT-LVL III: CPT | Mod: PBBFAC,,, | Performed by: NURSE PRACTITIONER

## 2020-10-27 PROCEDURE — 99214 PR OFFICE/OUTPT VISIT, EST, LEVL IV, 30-39 MIN: ICD-10-PCS | Mod: S$GLB,,, | Performed by: NURSE PRACTITIONER

## 2020-10-27 PROCEDURE — 1126F PR PAIN SEVERITY QUANTIFIED, NO PAIN PRESENT: ICD-10-PCS | Mod: S$GLB,,, | Performed by: NURSE PRACTITIONER

## 2020-10-27 PROCEDURE — 99214 OFFICE O/P EST MOD 30 MIN: CPT | Mod: S$GLB,,, | Performed by: NURSE PRACTITIONER

## 2020-10-27 PROCEDURE — 99499 UNLISTED E&M SERVICE: CPT | Mod: S$GLB,,, | Performed by: NURSE PRACTITIONER

## 2020-10-27 PROCEDURE — 1159F PR MEDICATION LIST DOCUMENTED IN MEDICAL RECORD: ICD-10-PCS | Mod: S$GLB,,, | Performed by: NURSE PRACTITIONER

## 2020-10-27 PROCEDURE — 99999 PR PBB SHADOW E&M-EST. PATIENT-LVL III: ICD-10-PCS | Mod: PBBFAC,,, | Performed by: NURSE PRACTITIONER

## 2020-10-27 PROCEDURE — 1101F PT FALLS ASSESS-DOCD LE1/YR: CPT | Mod: CPTII,S$GLB,, | Performed by: NURSE PRACTITIONER

## 2020-10-27 PROCEDURE — 1159F MED LIST DOCD IN RCRD: CPT | Mod: S$GLB,,, | Performed by: NURSE PRACTITIONER

## 2020-10-27 PROCEDURE — 1126F AMNT PAIN NOTED NONE PRSNT: CPT | Mod: S$GLB,,, | Performed by: NURSE PRACTITIONER

## 2020-10-27 PROCEDURE — 99499 RISK ADDL DX/OHS AUDIT: ICD-10-PCS | Mod: S$GLB,,, | Performed by: NURSE PRACTITIONER

## 2020-10-27 NOTE — PATIENT INSTRUCTIONS
"High Cholesterol    Continue Crestor 10 mg daily    High cholesterol foods to avoid/limit:     C: Cheese, milk, dairy  A: Animal Fats: hot dogs and hamburgers  G: "Getting it away from home": going out to eat a lot  E: Extra Fat: cookies, candy, and sweets  F: Family History    Remember high cholesterol can clog your arteries and increase risk for heart attack or stroke.     Osteoporosis   Continue Prolia   Let me know if you fracture    Check labs on RTC  "

## 2020-10-27 NOTE — ASSESSMENT & PLAN NOTE
Controlled LDL and high TG  Given information on high cholesterol foods to avoid  On Fish Oil and statin

## 2020-10-30 NOTE — PROGRESS NOTES
"  Physical Therapy Treatment Note     Name: Ross Rodriguez  Clinic Number: [id number]    Therapy Diagnosis:   Encounter Diagnoses   Name Primary?    Chronic pain of both shoulders Yes    Decreased ROM of left shoulder     Muscle weakness of left arm      Physician: Rambo Franks *    Visit Date: 11/2/2020    Physician Orders: PT Eval and Treat   Medical Diagnosis from Referral: M25.511,M25.512 (ICD-10-CM) - Bilateral shoulder pain, unspecified chronicity  Evaluation Date: 10/5/2020  Authorization Period Expiration: 12/05/2020  Plan of Care Expiration: 12/05/2020  Visit # / Visits authorized: 3 / 12    Time In: 9:00 am  Time Out: 9:40 am  Total Billable Time: 40 Minutes    Precautions: Standard    Subjective     Pt reports: feeling really good today. She states that she had an episode when reaching outside [place] with L arm and it aggravated it, but it went away shortly    She was compliant with home exercise program.  Response to previous treatment: no increase in soreness   Functional change: none    Pain: 0/10  Location: bilateral shoulder  L>R    Objective     Ross received therapeutic exercises to develop strength, ROM and flexibility for 40 minutes including:    Date  11/02/2020 10/19/2020 10/12/2020 10/5/2020   VISIT 3/12  12/05/2020 2/12  12/5/2020 2/6  10/9/2020 1/6  10/19/2020   POC EXP. DATE 12/05/2020 12/05/2020 12/05/2020 12/05/2020   VISIT AMOUNT  MEDICARE TOTAL 90.96  386.08 90.96  295.12 90.96  204.16 113.20   FACE-TO-FACE 11/05/2020 11/05/2020 11/05/2020 11/5/2020   FOTO 4/5 3/5 2/5 1/5            UBE Hold 2'/2'  Fwd/back Next --   TABLE:        Scapular retractions 20 x 3" 15 x 3" 15 x 3" 10 x 3"   Wand flexion 2 x 10 1 x 15 1 x 10 1 x 10   Wand ER at 45deg 90 deg 2 x 10 1 x 15  Try 90 deg  1 x 10 --   Scapular protractions 2 x 10 x 1# 2 x 10 1 x 10 --   Reverse codman's 30 x ea x 1#  cw/ccw 20 x ea cw/ccw 1 x 10 ea --   Side lying (L)sh:  - Flexion  - Abduction  - ER   2 x 10 x 1#  2 x 10 " "x 1#  2 x 10 x 1#   2 x 10  2 x 10  2 x 10   1 x 10  Next?  1 x 15    --  --  1 x 10   Jerry wings 2 towels  2 x 10 1 x 10      Pec str 2 towels  1'              STANDING:        Doorway stretch 3 x 30" 3 x 20" 5 x 20" 1 x 20"   Functional IR stretch 10 x 10" 10 x 10" 1 x 10 --   Rows 2 x 10 GTB 2 x 10 RTB 1 x 10 RTB --   Theraband:  - IR  - ER  - Ext  - T's   3 x 10 RTB  3 x 10 RTB  2 x 10 RTB  2 x 10 RTB   1 x 15 RTB  1 x 10 YTB -- --                     Initials SB SB DG MA       Home Exercises Provided and Patient Education Provided     Education provided:   - compliance with HEP    Written Home Exercises Provided: yes.  Exercises were reviewed and Ross was able to demonstrate them prior to the end of the session.  Ross demonstrated good  understanding of the education provided.     See EMR under Patient Instructions for exercises provided 10/12/2020.    Assessment     Ross returns to therapy with no pain today and is feeling very good. Pt was able to complete all of today's activities and new exercises without increase in pain noted prior to leaving the clinic. Pt demo'd weakness in both directions of rotations, although no pain reported. Pt had the most difficulty and muscular fatigue with resisted external rotation noted. Pt reports she has been doing her exercises twice daily and really sees an improvement.     Ross is progressing well towards her goals.   Pt prognosis is Good.     Pt will continue to benefit from skilled outpatient physical therapy to address the deficits listed in the problem list box on initial evaluation, provide pt/family education and to maximize pt's level of independence in the home and community environment.     Pt's spiritual, cultural and educational needs considered and pt agreeable to plan of care and goals.     Anticipated barriers to physical therapy: none    Goals:   Short Term Goals:  4 weeks  1. Patient will be compliant with HEP to promote the independent " management of current diagnosis. In progress, not met  2. Patient will increase left shoulder flexion to 160 degrees to improve reaching into overhead cabinets.  In progress, not met  3. Patient will increase left shoulder functiona IR to reach T10 for function of dressing. In progress, not met  4. Patient will report a decrease in complaints of left shoulder pain to 5/10 during performance of ADL's for independence of self care activities.  In progress, not met        Long Term Goals:  8 weeks  1. Patient will increase left shoulder strength to 4+/5 to improve tolerance to normal house work. In progress, not met  2. Patient will increase left shoulder flexion to 170 degrees in order to place dishes in overhead cabinets independently for self care independence.  In progress, not met  3. Patient will increase left shoulder functional IR to reach T7 for function of dressing. In progress, not met  4. Patient will increase scapular stabilization strength to 4+/5 to improve tolerance to normal lifting. In progress, not met  5. Patient will improve FOTO limitation status from 41% to 34% placing the patient in the 20-40% impaired, limited, or restricted category indicating increased functional mobility. In progress, not met    Plan     Continue with planned PT POC as tolerated. Increase sets with all exercises next visit.     Cindy Farrell, PTA 2/6

## 2020-11-02 ENCOUNTER — CLINICAL SUPPORT (OUTPATIENT)
Dept: REHABILITATION | Facility: HOSPITAL | Age: 75
End: 2020-11-02
Attending: STUDENT IN AN ORGANIZED HEALTH CARE EDUCATION/TRAINING PROGRAM
Payer: MEDICARE

## 2020-11-02 DIAGNOSIS — M25.612 DECREASED ROM OF LEFT SHOULDER: ICD-10-CM

## 2020-11-02 DIAGNOSIS — G89.29 CHRONIC PAIN OF BOTH SHOULDERS: Primary | ICD-10-CM

## 2020-11-02 DIAGNOSIS — M25.511 CHRONIC PAIN OF BOTH SHOULDERS: Primary | ICD-10-CM

## 2020-11-02 DIAGNOSIS — M25.512 CHRONIC PAIN OF BOTH SHOULDERS: Primary | ICD-10-CM

## 2020-11-02 DIAGNOSIS — M62.81 MUSCLE WEAKNESS OF LEFT ARM: ICD-10-CM

## 2020-11-02 PROCEDURE — 97110 THERAPEUTIC EXERCISES: CPT | Mod: PO,CQ

## 2020-11-06 ENCOUNTER — DOCUMENTATION ONLY (OUTPATIENT)
Dept: REHABILITATION | Facility: HOSPITAL | Age: 75
End: 2020-11-06

## 2020-11-06 DIAGNOSIS — M25.512 CHRONIC PAIN OF BOTH SHOULDERS: Primary | ICD-10-CM

## 2020-11-06 DIAGNOSIS — M62.81 MUSCLE WEAKNESS OF LEFT ARM: ICD-10-CM

## 2020-11-06 DIAGNOSIS — M25.612 DECREASED ROM OF LEFT SHOULDER: ICD-10-CM

## 2020-11-06 DIAGNOSIS — M25.511 CHRONIC PAIN OF BOTH SHOULDERS: Primary | ICD-10-CM

## 2020-11-06 DIAGNOSIS — G89.29 CHRONIC PAIN OF BOTH SHOULDERS: Primary | ICD-10-CM

## 2020-11-06 NOTE — PROGRESS NOTES
Face to Face PTA Conference performed with Tiff Yi PT regarding patient's current status, overall progress, and plan of care. Pt will be seen by a physical therapist minimally every 6th visit or every 30 days.    Cindy Farrell, PTA  11/6/2020    Face to Face PTA Conference performed with Cindy Farrell PTA regarding patient's current status, overall progress, and plan of care. Pt will be seen by a physical therapist minimally every 6th visit or every 30 days.    Tiff Yi, PT  11/6/2020

## 2020-11-06 NOTE — PROGRESS NOTES
"  Physical Therapy Treatment Note     Name: Ross Rodriguez  Clinic Number: [id number]    Therapy Diagnosis:   Encounter Diagnoses   Name Primary?    Chronic pain of both shoulders Yes    Decreased ROM of left shoulder     Muscle weakness of left arm      Physician: Rambo Franks *    Visit Date: 11/9/2020    Physician Orders: PT Eval and Treat   Medical Diagnosis from Referral: M25.511,M25.512 (ICD-10-CM) - Bilateral shoulder pain, unspecified chronicity  Evaluation Date: 10/5/2020  Authorization Period Expiration: 12/05/2020  Plan of Care Expiration: 12/05/2020  Visit # / Visits authorized: 4 / 12    Time In: 9:45 am  Time Out: 10:30 am  Total Billable Time: 45 Minutes    Precautions: Standard    Subjective     Pt reports: went to the gym on Friday and feels like she overdid it. She has minimal soreness present in L upper trap.    She was compliant with home exercise program.  Response to previous treatment: no increase in soreness   Functional change: none    Pain: 2/10  Location: bilateral shoulder  L>R    Objective     Ross received therapeutic exercises to develop strength, ROM and flexibility for 45 minutes including:    Date  11/09/2020 11/02/2020 10/19/2020 10/12/2020 10/5/2020   VISIT 4/12  12/05/2020 3/12  12/05/2020 2/12  12/5/2020 2/6  10/9/2020 1/6  10/19/2020   POC EXP. DATE 12/05/2020 12/05/2020 12/05/2020 12/05/2020 12/05/2020   VISIT AMOUNT  MEDICARE TOTAL 90.96  477.04 90.96  386.08 90.96  295.12 90.96  204.16 113.20   FACE-TO-FACE 12/05/2020 11/05/2020 11/05/2020 11/05/2020 11/5/2020   FOTO 5/5 4/5 3/5 2/5 1/5             UBE -- Hold 2'/2'  Fwd/back Next --   TABLE:         UT str Next?       Scapular retractions 20 x 3" 20 x 3" 15 x 3" 15 x 3" 10 x 3"   Wand flexion 2 x 10 x 1# 2 x 10 1 x 15 1 x 10 1 x 10   Wand ER at 45deg 90 deg 2 x 10 90 deg 2 x 10 1 x 15  Try 90 deg  1 x 10 --   Scapular protractions 2 x 10 x 1# 2 x 10 x 1# 2 x 10 1 x 10 --   Reverse codman's 30 x ea x 1#  cw/ccw " "30 x ea x 1#  cw/ccw 20 x ea cw/ccw 1 x 10 ea --   Side lying (L)sh:  - Flexion  - Abduction  - ER   2 x 10 x 1#  2 x 10 x 1#   2 x 10 x 1#  2 x 10 x 1#  2 x 10 x 1#   2 x 10  2 x 10  2 x 10   1 x 10  Next?  1 x 15    --  --  1 x 10   Jerry wings 2 towels  2 x 10 2 towels  2 x 10 1 x 10      Pec str 2 towels  2' 2 towels  1'               STANDING:         Doorway stretch L 3 x 30" 3 x 30" 3 x 20" 5 x 20" 1 x 20"   Functional IR stretch 10 x 10" 10 x 10" 10 x 10" 1 x 10 --   Rows 3 x 10 GTB 2 x 10 GTB 2 x 10 RTB 1 x 10 RTB --   Theraband:  - IR  - ER  - Ext  - T's   3 x 10 RTB  3 x 10 RTB  2 x 10 RTB  2 x 10 RTB   3 x 10 RTB  3 x 10 RTB  2 x 10 RTB  2 x 10 RTB   1 x 15 RTB  1 x 10 YTB -- --                       Initials SB SB SB DG MA       Home Exercises Provided and Patient Education Provided     Education provided:   - compliance with HEP    Written Home Exercises Provided: yes.  Exercises were reviewed and Ross was able to demonstrate them prior to the end of the session.  Ross demonstrated good  understanding of the education provided.     See EMR under Patient Instructions for exercises provided 10/12/2020.    Assessment     Ross returns to therapy with increased muscle soreness from returning to the gym last Friday. Pt was able to complete all of today's activities well without increase in pain noted prior to leaving the clinic. Pt had increased trigger points and soft tissue restrictions noted in L upper trap, although reports that pain improved by end of session.     Ross is progressing well towards her goals.   Pt prognosis is Good.     Pt will continue to benefit from skilled outpatient physical therapy to address the deficits listed in the problem list box on initial evaluation, provide pt/family education and to maximize pt's level of independence in the home and community environment.     Pt's spiritual, cultural and educational needs considered and pt agreeable to plan of care and " goals.     Anticipated barriers to physical therapy: none    Goals:   Short Term Goals:  4 weeks  1. Patient will be compliant with HEP to promote the independent management of current diagnosis. In progress, not met  2. Patient will increase left shoulder flexion to 160 degrees to improve reaching into overhead cabinets.  In progress, not met  3. Patient will increase left shoulder functiona IR to reach T10 for function of dressing. In progress, not met  4. Patient will report a decrease in complaints of left shoulder pain to 5/10 during performance of ADL's for independence of self care activities.  In progress, not met        Long Term Goals:  8 weeks  1. Patient will increase left shoulder strength to 4+/5 to improve tolerance to normal house work. In progress, not met  2. Patient will increase left shoulder flexion to 170 degrees in order to place dishes in overhead cabinets independently for self care independence.  In progress, not met  3. Patient will increase left shoulder functional IR to reach T7 for function of dressing. In progress, not met  4. Patient will increase scapular stabilization strength to 4+/5 to improve tolerance to normal lifting. In progress, not met  5. Patient will improve FOTO limitation status from 41% to 34% placing the patient in the 20-40% impaired, limited, or restricted category indicating increased functional mobility. In progress, not met    Plan     Continue with planned PT POC as tolerated. Address upper trap tightness with manual therapy if persists next visit.     Cindy Farrell, PTA 3/6

## 2020-11-09 ENCOUNTER — CLINICAL SUPPORT (OUTPATIENT)
Dept: REHABILITATION | Facility: HOSPITAL | Age: 75
End: 2020-11-09
Attending: STUDENT IN AN ORGANIZED HEALTH CARE EDUCATION/TRAINING PROGRAM
Payer: MEDICARE

## 2020-11-09 DIAGNOSIS — M25.612 DECREASED ROM OF LEFT SHOULDER: ICD-10-CM

## 2020-11-09 DIAGNOSIS — M62.81 MUSCLE WEAKNESS OF LEFT ARM: ICD-10-CM

## 2020-11-09 DIAGNOSIS — G89.29 CHRONIC PAIN OF BOTH SHOULDERS: Primary | ICD-10-CM

## 2020-11-09 DIAGNOSIS — M25.511 CHRONIC PAIN OF BOTH SHOULDERS: Primary | ICD-10-CM

## 2020-11-09 DIAGNOSIS — M25.512 CHRONIC PAIN OF BOTH SHOULDERS: Primary | ICD-10-CM

## 2020-11-09 PROCEDURE — 97110 THERAPEUTIC EXERCISES: CPT | Mod: PO,CQ

## 2020-11-12 ENCOUNTER — PES CALL (OUTPATIENT)
Dept: ADMINISTRATIVE | Facility: CLINIC | Age: 75
End: 2020-11-12

## 2020-11-13 NOTE — PROGRESS NOTES
"  Physical Therapy Treatment Note     Name: Ross Rodriguez  Clinic Number: [id number]    Therapy Diagnosis:   Encounter Diagnoses   Name Primary?    Chronic pain of both shoulders Yes    Decreased ROM of left shoulder     Muscle weakness of left arm      Physician: Rambo Franks *    Visit Date: 11/16/2020    Physician Orders: PT Eval and Treat   Medical Diagnosis from Referral: M25.511,M25.512 (ICD-10-CM) - Bilateral shoulder pain, unspecified chronicity  Evaluation Date: 10/5/2020  Authorization Period Expiration: 12/05/2020  Plan of Care Expiration: 12/05/2020  Visit # / Visits authorized: 5 / 12    Time In: 9:00 am  Time Out: 9:45 am  Total Billable Time: 45 Minutes    Precautions: Standard    Subjective     Pt reports: she has been trying to go back to sleeping on L side and is having increased pain.   She was compliant with home exercise program.  Response to previous treatment: no increase in soreness   Functional change: none    Pain: 2/10  Location: bilateral shoulder  L>R    Objective     Ross received therapeutic exercises to develop strength, ROM and flexibility for 35 minutes including:    Date  11/16/2020 11/09/2020 11/02/2020 10/19/2020 10/12/2020 10/5/2020   VISIT 5/12  12/05/2020 4/12  12/05/2020 3/12  12/05/2020 2/12  12/5/2020 2/6  10/9/2020 1/6  10/19/2020   POC EXP. DATE 12/05/2020 12/05/2020 12/05/2020 12/05/2020 12/05/2020 12/05/2020   VISIT AMOUNT  MEDICARE TOTAL 90.96  568.00 90.96  477.04 90.96  386.08 90.96  295.12 90.96  204.16 113.20   FACE-TO-FACE 12/05/2020 12/05/2020 11/05/2020 11/05/2020 11/05/2020 11/5/2020   FOTO -- 5/5 4/5 3/5 2/5 1/5              UBE -- -- Hold 2'/2'  Fwd/back Next --   TABLE:          UT str B 3 x 20" Next?       Scapular retractions 20 x 3" 20 x 3" 20 x 3" 15 x 3" 15 x 3" 10 x 3"   Wand flexion 2 x 10 x 1# 2 x 10 x 1# 2 x 10 1 x 15 1 x 10 1 x 10   Wand ER at 45deg 90 deg 2 x 10 90 deg 2 x 10 90 deg 2 x 10 1 x 15  Try 90 deg  1 x 10 --   Scapular " "protractions 3 x 10 x 1# 2 x 10 x 1# 2 x 10 x 1# 2 x 10 1 x 10 --   Reverse codman's 30 x ea x 1#  cw/ccw 30 x ea x 1#  cw/ccw 30 x ea x 1#  cw/ccw 20 x ea cw/ccw 1 x 10 ea --   Side lying (L)sh:  - Flexion  - Abduction  - ER   3 x 10 x 1#  3 x 10 x 1#  3 x 10 x 1#   2 x 10 x 1#  2 x 10 x 1#   2 x 10 x 1#  2 x 10 x 1#  2 x 10 x 1#   2 x 10  2 x 10  2 x 10   1 x 10  Next?  1 x 15    --  --  1 x 10   Jerry wings 2 towels  2 x 10 2 towels  2 x 10 2 towels  2 x 10 1 x 10      Pec str 2 towels  2' 2 towels  2' 2 towels  1'                STANDING:          Doorway stretch NT L 3 x 30" 3 x 30" 3 x 20" 5 x 20" 1 x 20"   Functional IR stretch NT 10 x 10" 10 x 10" 10 x 10" 1 x 10 --   Rows 3 x 10 GTB 3 x 10 GTB 2 x 10 GTB 2 x 10 RTB 1 x 10 RTB --   Theraband:  - IR  - ER  - Ext  - T's GTB next  NT   3 x 10 RTB  3 x 10 RTB  2 x 10 RTB  2 x 10 RTB   3 x 10 RTB  3 x 10 RTB  2 x 10 RTB  2 x 10 RTB   1 x 15 RTB  1 x 10 YTB -- --                         Initials SB 4/6 SB SB SB DG MA       Ross received the following manual therapy techniques: Soft tissue Mobilization were applied to the: B cervical region for 10 minutes, including:  - IASTM to B upper traps for pain and tension relief      Home Exercises Provided and Patient Education Provided     Education provided:   - compliance with HEP  - towel roll for cervical support during side sleeping.     Written Home Exercises Provided: yes.  Exercises were reviewed and Ross was able to demonstrate them prior to the end of the session.  Ross demonstrated good  understanding of the education provided.     See EMR under Patient Instructions for exercises provided 10/12/2020.    Assessment     Ross returns to therapy with consistent pain levels due to trying to sleep on L side more recently. Pt was able to complete all of today's activities and progressions well without increase in pain noted prior to leaving the clinic. Pt had increased trigger points and soft tissue " restrictions noted in bilateral upper trap during manual therapy, although reported great relief post manual therapy with reduced pain levels. Pt has been progressing well with shoulder strength and ROM is WFL, although pain is still present on anterior portion of shoulder. Pt would benefit from manual therapy as needed moving forward to reduce radicular symptoms from upper trap tightness.     Ross is progressing well towards her goals.   Pt prognosis is Good.     Pt will continue to benefit from skilled outpatient physical therapy to address the deficits listed in the problem list box on initial evaluation, provide pt/family education and to maximize pt's level of independence in the home and community environment.     Pt's spiritual, cultural and educational needs considered and pt agreeable to plan of care and goals.     Anticipated barriers to physical therapy: none    Goals:   Short Term Goals:  4 weeks  1. Patient will be compliant with HEP to promote the independent management of current diagnosis. In progress, not met  2. Patient will increase left shoulder flexion to 160 degrees to improve reaching into overhead cabinets.  In progress, not met  3. Patient will increase left shoulder functiona IR to reach T10 for function of dressing. In progress, not met  4. Patient will report a decrease in complaints of left shoulder pain to 5/10 during performance of ADL's for independence of self care activities.  In progress, not met        Long Term Goals:  8 weeks  1. Patient will increase left shoulder strength to 4+/5 to improve tolerance to normal house work. In progress, not met  2. Patient will increase left shoulder flexion to 170 degrees in order to place dishes in overhead cabinets independently for self care independence.  In progress, not met  3. Patient will increase left shoulder functional IR to reach T7 for function of dressing. In progress, not met  4. Patient will increase scapular stabilization  strength to 4+/5 to improve tolerance to normal lifting. In progress, not met  5. Patient will improve FOTO limitation status from 41% to 34% placing the patient in the 20-40% impaired, limited, or restricted category indicating increased functional mobility. In progress, not met    Plan     Continue with planned PT POC as tolerated. Continue with manual therapy as needed.      Cindy Farrell, PTA

## 2020-11-16 ENCOUNTER — CLINICAL SUPPORT (OUTPATIENT)
Dept: REHABILITATION | Facility: HOSPITAL | Age: 75
End: 2020-11-16
Attending: STUDENT IN AN ORGANIZED HEALTH CARE EDUCATION/TRAINING PROGRAM
Payer: MEDICARE

## 2020-11-16 DIAGNOSIS — M25.511 CHRONIC PAIN OF BOTH SHOULDERS: Primary | ICD-10-CM

## 2020-11-16 DIAGNOSIS — M62.81 MUSCLE WEAKNESS OF LEFT ARM: ICD-10-CM

## 2020-11-16 DIAGNOSIS — G89.29 CHRONIC PAIN OF BOTH SHOULDERS: Primary | ICD-10-CM

## 2020-11-16 DIAGNOSIS — M25.512 CHRONIC PAIN OF BOTH SHOULDERS: Primary | ICD-10-CM

## 2020-11-16 DIAGNOSIS — M25.612 DECREASED ROM OF LEFT SHOULDER: ICD-10-CM

## 2020-11-16 PROCEDURE — 97110 THERAPEUTIC EXERCISES: CPT | Mod: PO,CQ

## 2020-11-16 PROCEDURE — 97140 MANUAL THERAPY 1/> REGIONS: CPT | Mod: PO,CQ

## 2020-11-23 ENCOUNTER — CLINICAL SUPPORT (OUTPATIENT)
Dept: REHABILITATION | Facility: HOSPITAL | Age: 75
End: 2020-11-23
Attending: STUDENT IN AN ORGANIZED HEALTH CARE EDUCATION/TRAINING PROGRAM
Payer: MEDICARE

## 2020-11-23 DIAGNOSIS — M25.512 CHRONIC PAIN OF BOTH SHOULDERS: ICD-10-CM

## 2020-11-23 DIAGNOSIS — M25.612 DECREASED ROM OF LEFT SHOULDER: ICD-10-CM

## 2020-11-23 DIAGNOSIS — M25.511 CHRONIC PAIN OF BOTH SHOULDERS: ICD-10-CM

## 2020-11-23 DIAGNOSIS — G89.29 CHRONIC PAIN OF BOTH SHOULDERS: ICD-10-CM

## 2020-11-23 DIAGNOSIS — M62.81 MUSCLE WEAKNESS OF LEFT ARM: ICD-10-CM

## 2020-11-23 PROCEDURE — 97110 THERAPEUTIC EXERCISES: CPT | Mod: PO,CQ

## 2020-11-23 NOTE — PROGRESS NOTES
"  Physical Therapy Treatment Note     Name: Ross Rodriguez  Clinic Number: [id number]    Therapy Diagnosis:   Encounter Diagnoses   Name Primary?    Chronic pain of both shoulders     Decreased ROM of left shoulder     Muscle weakness of left arm      Physician: Rambo Franks *    Visit Date: 11/23/2020    Physician Orders: PT Eval and Treat   Medical Diagnosis from Referral: M25.511,M25.512 (ICD-10-CM) - Bilateral shoulder pain, unspecified chronicity  Evaluation Date: 10/5/2020  Authorization Period Expiration: 12/05/2020  Plan of Care Expiration: 12/05/2020  Visit # / Visits authorized: 6 / 12    Time In: 9:00 am  Time Out: 9:45 am  Total Billable Time: 45 Minutes    Precautions: Standard    Subjective     Pt reports: had an episode with increased shoulder pain last week when lifting a case of light canned goods and reaching OH.   She was compliant with home exercise program.  Response to previous treatment: no increase in soreness   Functional change: none    Pain: 1/10  Location: bilateral shoulder  L>R    Objective     Ross received therapeutic exercises to develop strength, ROM and flexibility for 45 minutes including:    Date  11/23/2020 11/16/2020 11/09/2020 11/02/2020 10/19/2020 10/12/2020 10/5/2020   VISIT 6/12  12/05/2020 5/12  12/05/2020 4/12  12/05/2020 3/12  12/05/2020 2/12  12/5/2020 2/6  10/9/2020 1/6  10/19/2020   POC EXP. DATE 12/05/2020 12/05/2020 12/05/2020 12/05/2020 12/05/2020 12/05/2020 12/05/2020   VISIT AMOUNT  MEDICARE TOTAL 90.96  656.10 88.10  565.14 90.96  477.04 90.96  386.08 90.96  295.12 90.96  204.16 113.20   FACE-TO-FACE 12/05/2020 12/05/2020 12/05/2020 11/05/2020 11/05/2020 11/05/2020 11/5/2020   FOTO -- -- 5/5 4/5 3/5 2/5 1/5               UBE -- -- -- Hold 2'/2'  Fwd/back Next --   TABLE:           UT str B 3 x 30" B 3 x 20" Next?       Scapular retractions NT 20 x 3" 20 x 3" 20 x 3" 15 x 3" 15 x 3" 10 x 3"   Wand flexion NT 2 x 10 x 1# 2 x 10 x 1# 2 x 10 1 x 15 1 x " "10 1 x 10   Wand ER at 45deg NT 90 deg 2 x 10 90 deg 2 x 10 90 deg 2 x 10 1 x 15  Try 90 deg  1 x 10 --   Scapular protractions 3 x 10 x 1# 3 x 10 x 1# 2 x 10 x 1# 2 x 10 x 1# 2 x 10 1 x 10 --   Reverse codman's 30 x ea x 1#  cw/ccw 30 x ea x 1#  cw/ccw 30 x ea x 1#  cw/ccw 30 x ea x 1#  cw/ccw 20 x ea cw/ccw 1 x 10 ea --   Side lying (L)sh:  - Flexion  - Abduction  - ER NT   3 x 10 x 1#  3 x 10 x 1#  3 x 10 x 1#   2 x 10 x 1#  2 x 10 x 1#   2 x 10 x 1#  2 x 10 x 1#  2 x 10 x 1#   2 x 10  2 x 10  2 x 10   1 x 10  Next?  1 x 15    --  --  1 x 10   Jerry wings NT 2 towels  2 x 10 2 towels  2 x 10 2 towels  2 x 10 1 x 10      Pec str NT 2 towels  2' 2 towels  2' 2 towels  1'                 STANDING:           Doorway stretch L 3 x 30" NT L 3 x 30" 3 x 30" 3 x 20" 5 x 20" 1 x 20"   Functional IR stretch 10 x 10" NT 10 x 10" 10 x 10" 10 x 10" 1 x 10 --   Rows 3 x 10 BTB 3 x 10 GTB 3 x 10 GTB 2 x 10 GTB 2 x 10 RTB 1 x 10 RTB --   Theraband:  - IR  - ER  - Ext  - T's  - Punch out   3 x 10 GTB  3 x 10 GTB  3 x 10 GTB  2 x 10 GTB  2 x 10 GTB GTB next  NT   3 x 10 RTB  3 x 10 RTB  2 x 10 RTB  2 x 10 RTB   3 x 10 RTB  3 x 10 RTB  2 x 10 RTB  2 x 10 RTB   1 x 15 RTB  1 x 10 YTB -- --   Shldr ROM:  - Flexion  - Scaption  - Abduction   2 x 10  2 x 10  2 x 10          OH Press 2 x 10                    Initials SB 5/6 SB 4/6 SB SB SB DG MA       Joyclyn received the following manual therapy techniques: Soft tissue Mobilization were applied to the: B cervical region for 0 minutes, including:  - IASTM to B upper traps for pain and tension relief      Home Exercises Provided and Patient Education Provided     Education provided:   - compliance with HEP  - towel roll for cervical support during side sleeping.     Written Home Exercises Provided: yes.  Exercises were reviewed and Ross was able to demonstrate them prior to the end of the session.  Ross demonstrated good  understanding of the education provided.     See EMR under " Patient Instructions for exercises provided 11/16/2020.    Assessment     Ross returns to therapy with improved pain levels, although had an episode of increased pain last week when performing certain activities. Pt was able to complete all of today's progressions and new exercises well with muscular fatigue noted during scaption, abduction, and OH press. Pt required further instruction on cervical stretching HEP she received last week today to ensure proper technique. Pt continues to have limitations with certain ADL's due to increased pain in upper trap/scapular region on L side that subsides after a day or two.     Ross is progressing well towards her goals.   Pt prognosis is Good.     Pt will continue to benefit from skilled outpatient physical therapy to address the deficits listed in the problem list box on initial evaluation, provide pt/family education and to maximize pt's level of independence in the home and community environment.     Pt's spiritual, cultural and educational needs considered and pt agreeable to plan of care and goals.     Anticipated barriers to physical therapy: none    Goals:   Short Term Goals:  4 weeks  1. Patient will be compliant with HEP to promote the independent management of current diagnosis. In progress, not met  2. Patient will increase left shoulder flexion to 160 degrees to improve reaching into overhead cabinets.  In progress, not met  3. Patient will increase left shoulder functiona IR to reach T10 for function of dressing. In progress, not met  4. Patient will report a decrease in complaints of left shoulder pain to 5/10 during performance of ADL's for independence of self care activities.  In progress, not met        Long Term Goals:  8 weeks  1. Patient will increase left shoulder strength to 4+/5 to improve tolerance to normal house work. In progress, not met  2. Patient will increase left shoulder flexion to 170 degrees in order to place dishes in overhead  cabinets independently for self care independence.  In progress, not met  3. Patient will increase left shoulder functional IR to reach T7 for function of dressing. In progress, not met  4. Patient will increase scapular stabilization strength to 4+/5 to improve tolerance to normal lifting. In progress, not met  5. Patient will improve FOTO limitation status from 41% to 34% placing the patient in the 20-40% impaired, limited, or restricted category indicating increased functional mobility. In progress, not met    Plan     Continue with planned PT POC as tolerated. Continue with manual therapy as needed.      Cindy Farrell, PTA

## 2020-11-30 ENCOUNTER — CLINICAL SUPPORT (OUTPATIENT)
Dept: REHABILITATION | Facility: HOSPITAL | Age: 75
End: 2020-11-30
Attending: STUDENT IN AN ORGANIZED HEALTH CARE EDUCATION/TRAINING PROGRAM
Payer: MEDICARE

## 2020-11-30 DIAGNOSIS — G89.29 CHRONIC PAIN OF BOTH SHOULDERS: ICD-10-CM

## 2020-11-30 DIAGNOSIS — M62.81 MUSCLE WEAKNESS OF LEFT ARM: ICD-10-CM

## 2020-11-30 DIAGNOSIS — M25.612 DECREASED ROM OF LEFT SHOULDER: ICD-10-CM

## 2020-11-30 DIAGNOSIS — M25.511 CHRONIC PAIN OF BOTH SHOULDERS: ICD-10-CM

## 2020-11-30 DIAGNOSIS — M25.512 CHRONIC PAIN OF BOTH SHOULDERS: ICD-10-CM

## 2020-11-30 PROCEDURE — 97110 THERAPEUTIC EXERCISES: CPT | Mod: PO

## 2020-11-30 NOTE — PROGRESS NOTES
"  Physical Therapy Treatment Note     Name: Ross Rodriguez  Clinic Number: [id number]    Therapy Diagnosis:   Encounter Diagnoses   Name Primary?    Chronic pain of both shoulders     Decreased ROM of left shoulder     Muscle weakness of left arm      Physician: Rambo Franks *    Visit Date: 11/30/2020    Physician Orders: PT Eval and Treat   Medical Diagnosis from Referral: M25.511,M25.512 (ICD-10-CM) - Bilateral shoulder pain, unspecified chronicity  Evaluation Date: 10/5/2020  Authorization Period Expiration: 12/05/2020  Plan of Care Expiration: 12/05/2020  Visit # / Visits authorized: 7 / 12    Time In: 9:00 am  Time Out: 9:45 am  Total Billable Time: 45 Minutes    Precautions: Standard    Subjective     Pt reports: she did not exercise on Thursday and her shoulder felt better. She is still having pain with her stretches, her HEP, and when lifting anything overhead.  She was compliant with home exercise program.  Response to previous treatment: no increase in soreness   Functional change: none    Pain: 0/10, 5-8/10 with movement  Location: bilateral shoulder  L>R    Objective     Ross received therapeutic exercises to develop strength, ROM and flexibility for 45 minutes including:    Date  11/30/2020 11/23/2020 11/16/2020 11/09/2020 11/02/2020 10/19/2020 10/12/2020 10/5/2020   VISIT 7/12  12/05/2020 6/12  12/05/2020 5/12  12/05/2020 4/12  12/05/2020 3/12  12/05/2020 2/12  12/5/2020 2/6  10/9/2020 1/6  10/19/2020   POC EXP. DATE 12/05/2020 12/05/2020 12/05/2020 12/05/2020 12/05/2020 12/05/2020 12/05/2020 12/05/2020   VISIT AMOUNT  MEDICARE TOTAL-  90.96  656.10 88.10  565.14 90.96  477.04 90.96  386.08 90.96  295.12 90.96  204.16 113.20   FACE-TO-FACE 12/05/2020 12/05/2020 12/05/2020 12/05/2020 11/05/2020 11/05/2020 11/05/2020 11/5/2020   FOTO -- -- -- 5/5 4/5 3/5 2/5 1/5                UBE -- -- -- -- Hold 2'/2'  Fwd/back Next --   TABLE:            UT str B 3 x 30" B 3 x 30" B 3 x 20" Next?     " "  Scapular retractions Not today NT 20 x 3" 20 x 3" 20 x 3" 15 x 3" 15 x 3" 10 x 3"   Wand flexion 1 x 10 NT 2 x 10 x 1# 2 x 10 x 1# 2 x 10 1 x 15 1 x 10 1 x 10   Wand ER at 45deg Not today NT 90 deg 2 x 10 90 deg 2 x 10 90 deg 2 x 10 1 x 15  Try 90 deg  1 x 10 --   Scapular protractions Not today 3 x 10 x 1# 3 x 10 x 1# 2 x 10 x 1# 2 x 10 x 1# 2 x 10 1 x 10 --   Reverse codman's Not today 30 x ea x 1#  cw/ccw 30 x ea x 1#  cw/ccw 30 x ea x 1#  cw/ccw 30 x ea x 1#  cw/ccw 20 x ea cw/ccw 1 x 10 ea --   Side lying (L)sh:  - Flexion  - Abduction  - ER Not today NT   3 x 10 x 1#  3 x 10 x 1#  3 x 10 x 1#   2 x 10 x 1#  2 x 10 x 1#   2 x 10 x 1#  2 x 10 x 1#  2 x 10 x 1#   2 x 10  2 x 10  2 x 10   1 x 10  Next?  1 x 15    --  --  1 x 10   Jerry wings Not today NT 2 towels  2 x 10 2 towels  2 x 10 2 towels  2 x 10 1 x 10      Pec str Not today NT 2 towels  2' 2 towels  2' 2 towels  1'                  STANDING:            Doorway stretch L 3 x 30" L 3 x 30" NT L 3 x 30" 3 x 30" 3 x 20" 5 x 20" 1 x 20"   Functional IR stretch  Not today 10 x 10" NT 10 x 10" 10 x 10" 10 x 10" 1 x 10 --   Rows 3 x 10 GTB 3 x 10 BTB 3 x 10 GTB 3 x 10 GTB 2 x 10 GTB 2 x 10 RTB 1 x 10 RTB --   Theraband:  - IR  - ER  - Ext  - T's  - Punch out   3 x 10 GTB  3 x 10 GTB  3 x 10 GTB  2 x 10 GTB  2 x 10 GTB      3 x 10 GTB  3 x 10 GTB  3 x 10 GTB  2 x 10 GTB  2 x 10 GTB GTB next  NT   3 x 10 RTB  3 x 10 RTB  2 x 10 RTB  2 x 10 RTB   3 x 10 RTB  3 x 10 RTB  2 x 10 RTB  2 x 10 RTB   1 x 15 RTB  1 x 10 YTB -- --   Shldr ROM:  - Flexion  - Scaption  - Abduction   Not today  Not today  Not today   2 x 10  2 x 10  2 x 10          OH Press Not today 2 x 10                     Initials DG SB 5/6 SB 4/6 SB SB SB DG LAYLA Hawkins received the following manual therapy techniques: Soft tissue Mobilization were applied to the: B cervical region for 0 minutes, including:  - IASTM to B upper traps for pain and tension relief      Home Exercises Provided and " Patient Education Provided     Education provided:   - compliance with HEP  - towel roll for cervical support during side sleeping.   - avoid excessive neck flexion when lying in her recliner to watch television.     Written Home Exercises Provided: yes.  Exercises were reviewed and Ross was able to demonstrate them prior to the end of the session.  Ross demonstrated good  understanding of the education provided.     See EMR under Patient Instructions for exercises provided 11/16/2020.    Assessment     Ross continues to have complaints of pain with her HEP as she continues to push her stretches and exercises to the point of pain. She required frequent verbal and tactile cues for posture with all exercises to avoid impinging her left shoulder. She was instructed to perform her flexion activities and ER with theraband with her thumb up to avoid impinging her left shoulder. When performing her theraband exercises she required frequent verbal cues for scapula placement.     Ross is progressing well towards her goals.   Pt prognosis is Good.     Pt will continue to benefit from skilled outpatient physical therapy to address the deficits listed in the problem list box on initial evaluation, provide pt/family education and to maximize pt's level of independence in the home and community environment.     Pt's spiritual, cultural and educational needs considered and pt agreeable to plan of care and goals.     Anticipated barriers to physical therapy: none    Goals:   Short Term Goals:  4 weeks  1. Patient will be compliant with HEP to promote the independent management of current diagnosis. In progress, not met  2. Patient will increase left shoulder flexion to 160 degrees to improve reaching into overhead cabinets.  In progress, not met  3. Patient will increase left shoulder functiona IR to reach T10 for function of dressing. In progress, not met  4. Patient will report a decrease in complaints of left  shoulder pain to 5/10 during performance of ADL's for independence of self care activities.  In progress, not met        Long Term Goals:  8 weeks  1. Patient will increase left shoulder strength to 4+/5 to improve tolerance to normal house work. In progress, not met  2. Patient will increase left shoulder flexion to 170 degrees in order to place dishes in overhead cabinets independently for self care independence.  In progress, not met  3. Patient will increase left shoulder functional IR to reach T7 for function of dressing. In progress, not met  4. Patient will increase scapular stabilization strength to 4+/5 to improve tolerance to normal lifting. In progress, not met  5. Patient will improve FOTO limitation status from 41% to 34% placing the patient in the 20-40% impaired, limited, or restricted category indicating increased functional mobility. In progress, not met    Plan     Continue with planned PT POC as tolerated. Resume all of her usual exercises next visit.     Tiff Yi, PT

## 2020-12-01 ENCOUNTER — INFUSION (OUTPATIENT)
Dept: INFECTIOUS DISEASES | Facility: HOSPITAL | Age: 75
End: 2020-12-01
Attending: INTERNAL MEDICINE
Payer: MEDICARE

## 2020-12-01 VITALS
SYSTOLIC BLOOD PRESSURE: 128 MMHG | DIASTOLIC BLOOD PRESSURE: 67 MMHG | BODY MASS INDEX: 31.17 KG/M2 | WEIGHT: 158.75 LBS | HEIGHT: 60 IN

## 2020-12-01 DIAGNOSIS — M81.0 AGE-RELATED OSTEOPOROSIS WITHOUT CURRENT PATHOLOGICAL FRACTURE: Primary | ICD-10-CM

## 2020-12-01 PROCEDURE — 96372 THER/PROPH/DIAG INJ SC/IM: CPT

## 2020-12-01 PROCEDURE — 63600175 PHARM REV CODE 636 W HCPCS: Mod: JG | Performed by: NURSE PRACTITIONER

## 2020-12-01 RX ADMIN — DENOSUMAB 60 MG: 60 INJECTION SUBCUTANEOUS at 02:12

## 2020-12-07 ENCOUNTER — CLINICAL SUPPORT (OUTPATIENT)
Dept: REHABILITATION | Facility: HOSPITAL | Age: 75
End: 2020-12-07
Attending: STUDENT IN AN ORGANIZED HEALTH CARE EDUCATION/TRAINING PROGRAM
Payer: MEDICARE

## 2020-12-07 DIAGNOSIS — M62.81 MUSCLE WEAKNESS OF LEFT ARM: ICD-10-CM

## 2020-12-07 DIAGNOSIS — M25.612 DECREASED ROM OF LEFT SHOULDER: ICD-10-CM

## 2020-12-07 DIAGNOSIS — M25.512 CHRONIC PAIN OF BOTH SHOULDERS: ICD-10-CM

## 2020-12-07 DIAGNOSIS — M25.511 CHRONIC PAIN OF BOTH SHOULDERS: ICD-10-CM

## 2020-12-07 DIAGNOSIS — G89.29 CHRONIC PAIN OF BOTH SHOULDERS: ICD-10-CM

## 2020-12-07 PROCEDURE — 97164 PT RE-EVAL EST PLAN CARE: CPT | Mod: PO

## 2020-12-07 NOTE — PROGRESS NOTES
"  Physical Therapy Treatment Note     Name: Ross Rodriguez  Clinic Number: [id number]    Therapy Diagnosis:   No diagnosis found.  Physician: Rambo Franks, *    Visit Date: 12/7/2020    Physician Orders: PT Eval and Treat   Medical Diagnosis from Referral: M25.511,M25.512 (ICD-10-CM) - Bilateral shoulder pain, unspecified chronicity  Evaluation Date: 10/5/2020  Authorization Period Expiration: 12/05/2020  Plan of Care Expiration: 12/05/2020  Visit # / Visits authorized: 8 / 12    Time In: *** am  Time Out: *** am  Total Billable Time: *** Minutes    Precautions: Standard    Subjective     Pt reports: ***  She was compliant with home exercise program.  Response to previous treatment: no increase in soreness   Functional change: none    Pain: 0/10, 5-8/10 with movement  Location: bilateral shoulder  L>R    Objective     Ross received therapeutic exercises to develop strength, ROM and flexibility for *** minutes including:    Date  12/7/2020 11/30/2020 11/23/2020 11/16/2020 11/09/2020 11/02/2020 10/19/2020 10/12/2020 10/5/2020   VISIT 8/12  12/05/2020 7/12  12/05/2020 6/12  12/05/2020 5/12  12/05/2020 4/12  12/05/2020 3/12  12/05/2020 2/12  12/5/2020 2/6  10/9/2020 1/6  10/19/2020   POC EXP. DATE 12/05/2020 12/05/2020 12/05/2020 12/05/2020 12/05/2020 12/05/2020 12/05/2020 12/05/2020 12/05/2020   VISIT AMOUNT  MEDICARE TOTAL-   90.96  656.10 88.10  565.14 90.96  477.04 90.96  386.08 90.96  295.12 90.96  204.16 113.20   FACE-TO-FACE 12/05/2020 12/05/2020 12/05/2020 12/05/2020 12/05/2020 11/05/2020 11/05/2020 11/05/2020 11/5/2020   FOTO  -- -- -- 5/5 4/5 3/5 2/5 1/5                 UBE  -- -- -- -- Hold 2'/2'  Fwd/back Next --   TABLE:             UT str  B 3 x 30" B 3 x 30" B 3 x 20" Next?       Scapular retractions  Not today NT 20 x 3" 20 x 3" 20 x 3" 15 x 3" 15 x 3" 10 x 3"   Wand flexion  1 x 10 NT 2 x 10 x 1# 2 x 10 x 1# 2 x 10 1 x 15 1 x 10 1 x 10   Wand ER at 45deg  Not today NT 90 deg 2 x 10 90 deg 2 x " "10 90 deg 2 x 10 1 x 15  Try 90 deg  1 x 10 --   Scapular protractions  Not today 3 x 10 x 1# 3 x 10 x 1# 2 x 10 x 1# 2 x 10 x 1# 2 x 10 1 x 10 --   Reverse codman's  Not today 30 x ea x 1#  cw/ccw 30 x ea x 1#  cw/ccw 30 x ea x 1#  cw/ccw 30 x ea x 1#  cw/ccw 20 x ea cw/ccw 1 x 10 ea --   Side lying (L)sh:  - Flexion  - Abduction  - ER  Not today NT   3 x 10 x 1#  3 x 10 x 1#  3 x 10 x 1#   2 x 10 x 1#  2 x 10 x 1#   2 x 10 x 1#  2 x 10 x 1#  2 x 10 x 1#   2 x 10  2 x 10  2 x 10   1 x 10  Next?  1 x 15    --  --  1 x 10   Jerry wings  Not today NT 2 towels  2 x 10 2 towels  2 x 10 2 towels  2 x 10 1 x 10      Pec str  Not today NT 2 towels  2' 2 towels  2' 2 towels  1'                   STANDING:             Doorway stretch  L 3 x 30" L 3 x 30" NT L 3 x 30" 3 x 30" 3 x 20" 5 x 20" 1 x 20"   Functional IR stretch   Not today 10 x 10" NT 10 x 10" 10 x 10" 10 x 10" 1 x 10 --   Rows  3 x 10 GTB 3 x 10 BTB 3 x 10 GTB 3 x 10 GTB 2 x 10 GTB 2 x 10 RTB 1 x 10 RTB --   Theraband:  - IR  - ER  - Ext  - T's  - Punch out    3 x 10 GTB  3 x 10 GTB  3 x 10 GTB  2 x 10 GTB  2 x 10 GTB      3 x 10 GTB  3 x 10 GTB  3 x 10 GTB  2 x 10 GTB  2 x 10 GTB GTB next  NT   3 x 10 RTB  3 x 10 RTB  2 x 10 RTB  2 x 10 RTB   3 x 10 RTB  3 x 10 RTB  2 x 10 RTB  2 x 10 RTB   1 x 15 RTB  1 x 10 YTB -- --   Shldr ROM:  - Flexion  - Scaption  - Abduction    Not today  Not today  Not today   2 x 10  2 x 10  2 x 10          OH Press  Not today 2 x 10                      Initials LT DG SB 5/6 SB 4/6 SB SB SB DG LAYLA Hawkins received the following manual therapy techniques: Soft tissue Mobilization were applied to the: B cervical region for 0 minutes, including: ***  - IASTM to B upper traps for pain and tension relief      Home Exercises Provided and Patient Education Provided     Education provided:   - compliance with HEP  - towel roll for cervical support during side sleeping.   - avoid excessive neck flexion when lying in her recliner to watch " television.     Written Home Exercises Provided: yes.  Exercises were reviewed and Ross was able to demonstrate them prior to the end of the session.  Ross demonstrated good  understanding of the education provided.     See EMR under Patient Instructions for exercises provided 11/16/2020.    Assessment     Ross ***    Ross is progressing well towards her goals.   Pt prognosis is Good.     Pt will continue to benefit from skilled outpatient physical therapy to address the deficits listed in the problem list box on initial evaluation, provide pt/family education and to maximize pt's level of independence in the home and community environment.     Pt's spiritual, cultural and educational needs considered and pt agreeable to plan of care and goals.     Anticipated barriers to physical therapy: none    Goals:   Short Term Goals:  4 weeks  1. Patient will be compliant with HEP to promote the independent management of current diagnosis. In progress, not met  2. Patient will increase left shoulder flexion to 160 degrees to improve reaching into overhead cabinets.  In progress, not met  3. Patient will increase left shoulder functiona IR to reach T10 for function of dressing. In progress, not met  4. Patient will report a decrease in complaints of left shoulder pain to 5/10 during performance of ADL's for independence of self care activities.  In progress, not met        Long Term Goals:  8 weeks  1. Patient will increase left shoulder strength to 4+/5 to improve tolerance to normal house work. In progress, not met  2. Patient will increase left shoulder flexion to 170 degrees in order to place dishes in overhead cabinets independently for self care independence.  In progress, not met  3. Patient will increase left shoulder functional IR to reach T7 for function of dressing. In progress, not met  4. Patient will increase scapular stabilization strength to 4+/5 to improve tolerance to normal lifting. In progress,  not met  5. Patient will improve FOTO limitation status from 41% to 34% placing the patient in the 20-40% impaired, limited, or restricted category indicating increased functional mobility. In progress, not met    Plan     Continue with planned PT POC as tolerated. Resume all of her usual exercises next visit. ***    Karon Mao, PT

## 2020-12-07 NOTE — PROGRESS NOTES
Physical Therapy Treatment Note     Name: Ross Rodriguez  Clinic Number: [id number]    Therapy Diagnosis:   Encounter Diagnoses   Name Primary?    Chronic pain of both shoulders     Decreased ROM of left shoulder     Muscle weakness of left arm      Physician: Rambo Franks *    Visit Date: 12/7/2020    Physician Orders: PT Eval and Treat   Medical Diagnosis from Referral: M25.511,M25.512 (ICD-10-CM) - Bilateral shoulder pain, unspecified chronicity  Evaluation Date: 10/5/2020  Authorization Period Expiration: 12/05/2020  Plan of Care Expiration: 12/05/2020  Visit # / Visits authorized: 8 / 12    Time In: 1:00 pm  Time Out: 1:25 pm  Total Billable Time: 25 Minutes    Precautions: Standard    Subjective     Pt reports: she continues to have left shoulder pain and believes she has hit a plateau with therapy.  She has been having increased pain after performing HEP and recently held off doing exercises.    She was compliant with home exercise program.  Response to previous treatment: no increase in soreness   Functional change: none    Pain: 0/10, 5-8/10 with movement  Location: bilateral shoulder  L>R    Objective     Ross received therapeutic exercises to develop strength, ROM and flexibility for 0 minutes including:    Date  12/7/2020 11/30/2020 11/23/2020 11/16/2020 11/09/2020 11/02/2020 10/19/2020 10/12/2020 10/5/2020   VISIT 8/12  12/05/2020 7/12  12/05/2020 6/12  12/05/2020 5/12  12/05/2020 4/12  12/05/2020 3/12  12/05/2020 2/12  12/5/2020 2/6  10/9/2020 1/6  10/19/2020   POC EXP. DATE 12/5/2020 12/05/2020 12/05/2020 12/05/2020 12/05/2020 12/05/2020 12/05/2020 12/05/2020 12/05/2020   VISIT AMOUNT  MEDICARE TOTAL- 55.98  90.96  656.10 88.10  565.14 90.96  477.04 90.96  386.08 90.96  295.12 90.96  204.16 113.20   FACE-TO-FACE 12/5/2020 12/05/2020 12/05/2020 12/05/2020/05/2020 12/05/2020 11/05/2020 11/05/2020 11/05/2020 11/5/2020   FOTO  -- -- -- 5/5 4/5 3/5 2/5 1/5                 SCOTTY  -- -- -- -- Hold  "2'/2'  Fwd/back Next --   TABLE:             UT str  B 3 x 30" B 3 x 30" B 3 x 20" Next?       Scapular retractions  Not today NT 20 x 3" 20 x 3" 20 x 3" 15 x 3" 15 x 3" 10 x 3"   Wand flexion  1 x 10 NT 2 x 10 x 1# 2 x 10 x 1# 2 x 10 1 x 15 1 x 10 1 x 10   Wand ER at 45deg  Not today NT 90 deg 2 x 10 90 deg 2 x 10 90 deg 2 x 10 1 x 15  Try 90 deg  1 x 10 --   Scapular protractions  Not today 3 x 10 x 1# 3 x 10 x 1# 2 x 10 x 1# 2 x 10 x 1# 2 x 10 1 x 10 --   Reverse codman's  Not today 30 x ea x 1#  cw/ccw 30 x ea x 1#  cw/ccw 30 x ea x 1#  cw/ccw 30 x ea x 1#  cw/ccw 20 x ea cw/ccw 1 x 10 ea --   Side lying (L)sh:  - Flexion  - Abduction  - ER  Not today NT   3 x 10 x 1#  3 x 10 x 1#  3 x 10 x 1#   2 x 10 x 1#  2 x 10 x 1#   2 x 10 x 1#  2 x 10 x 1#  2 x 10 x 1#   2 x 10  2 x 10  2 x 10   1 x 10  Next?  1 x 15    --  --  1 x 10   Jerry wings  Not today NT 2 towels  2 x 10 2 towels  2 x 10 2 towels  2 x 10 1 x 10      Pec str  Not today NT 2 towels  2' 2 towels  2' 2 towels  1'                   STANDING:             Doorway stretch  L 3 x 30" L 3 x 30" NT L 3 x 30" 3 x 30" 3 x 20" 5 x 20" 1 x 20"   Functional IR stretch   Not today 10 x 10" NT 10 x 10" 10 x 10" 10 x 10" 1 x 10 --   Rows  3 x 10 GTB 3 x 10 BTB 3 x 10 GTB 3 x 10 GTB 2 x 10 GTB 2 x 10 RTB 1 x 10 RTB --   Theraband:  - IR  - ER  - Ext  - T's  - Punch out    3 x 10 GTB  3 x 10 GTB  3 x 10 GTB  2 x 10 GTB  2 x 10 GTB      3 x 10 GTB  3 x 10 GTB  3 x 10 GTB  2 x 10 GTB  2 x 10 GTB GTB next  NT   3 x 10 RTB  3 x 10 RTB  2 x 10 RTB  2 x 10 RTB   3 x 10 RTB  3 x 10 RTB  2 x 10 RTB  2 x 10 RTB   1 x 15 RTB  1 x 10 YTB -- --   Shldr ROM:  - Flexion  - Scaption  - Abduction    Not today  Not today  Not today   2 x 10  2 x 10  2 x 10          OH Press  Not today 2 x 10                      Initials MA DG SB 5/6 SB 4/6 SB SB SB DG MA       Joyclyn received the following manual therapy techniques: Soft tissue Mobilization were applied to the: B cervical region for " 0 minutes, including:  - IASTM to B upper traps for pain and tension relief    Passive Range of Motion:   Shoulder Left Right   Flexion 150 170   Abduction 150 170   ER at 90 90 90   IR 90 80      Active Range of Motion:   Shoulder Left Right   Flexion 165 170   Abduction 160 170   ER at 0 Reach T3 Reach T3   IR Reach T8 Reach T7      Upper Extremity Strength    (L) UE (R) UE   Shoulder flexion: 4-/5 pain 4+/5   Shoulder Abduction: 3+/5 pain 4/5   Shoulder ER 4/5 4/5   Shoulder IR 4/5 pain 4+/5   Middle Trap 4-/5 4-/5   Rhomboids 4-/5 4-/5            Joint Mobility: normal joint mobility testing of left GH joint, voluntary muscle guarding present     Palpation: tenderness to palpation noted in infraspinatus, teres minor     Sensation: intact to light touch     Flexibility: tightness in pec minor     Functional limitation reporting disability percentage was obtained through use of FOTO Shoulder Survey indicating 47% disability       Home Exercises Provided and Patient Education Provided     Education provided:   - compliance with HEP  - towel roll for cervical support during side sleeping.   - avoid excessive neck flexion when lying in her recliner to watch television.     Written Home Exercises Provided: yes.  Exercises were reviewed and Xochitlyassinevelvet was able to demonstrate them prior to the end of the session.  Ross demonstrated good  understanding of the education provided.     See EMR under Patient Instructions for exercises provided 11/16/2020.    Assessment     Ross has been compliant with HEP and attending therapy, but reporting continued posterior shoulder pain during certain daily activities.  She is reporting a plateau with treatment and would like to return to MD for follow up.  Pt is noted to have improved AROM and strength as compared to evaluation but continues to have tenderness in posterior rotator cuff musculature.  She met STG's #1,2,and 3.  Pt will be discharged from PT at this time due to lack of  progression and refer back to MD for further medical evaluation.     Ross is progressing well towards her goals.   Pt prognosis is Good.     Pt will continue to benefit from skilled outpatient physical therapy to address the deficits listed in the problem list box on initial evaluation, provide pt/family education and to maximize pt's level of independence in the home and community environment.     Pt's spiritual, cultural and educational needs considered and pt agreeable to plan of care and goals.     Anticipated barriers to physical therapy: none    Goals:   Short Term Goals:  4 weeks  1. Patient will be compliant with HEP to promote the independent management of current diagnosis. met  2. Patient will increase left shoulder flexion to 160 degrees to improve reaching into overhead cabinets.  met  3. Patient will increase left shoulder functiona IR to reach T10 for function of dressing. met  4. Patient will report a decrease in complaints of left shoulder pain to 5/10 during performance of ADL's for independence of self care activities.   not met        Long Term Goals:  8 weeks  1. Patient will increase left shoulder strength to 4+/5 to improve tolerance to normal house work. not met  2. Patient will increase left shoulder flexion to 170 degrees in order to place dishes in overhead cabinets independently for self care independence.  not met  3. Patient will increase left shoulder functional IR to reach T7 for function of dressing. met  4. Patient will increase scapular stabilization strength to 4+/5 to improve tolerance to normal lifting. not met  5. Patient will improve FOTO limitation status from 41% to 34% placing the patient in the 20-40% impaired, limited, or restricted category indicating increased functional mobility.  not met    Plan     Discharge pt from PT to HEP.     Ty Trimble, PT

## 2020-12-07 NOTE — PLAN OF CARE
Outpatient Therapy Discharge Summary     Name: Ross Rodriguez  Clinic Number: [id number]    Therapy Diagnosis:   Encounter Diagnoses   Name Primary?    Chronic pain of both shoulders     Decreased ROM of left shoulder     Muscle weakness of left arm      Physician: Rambo Franks, *    Physician Orders: PT Eval and Treat   Medical Diagnosis from Referral: M25.511,M25.512 (ICD-10-CM) - Bilateral shoulder pain, unspecified chronicity  Evaluation Date: 10/5/2020    Date of Last visit: 12/7/2020  Total Visits Received: 8  Cancelled Visits: 1  No Show Visits: 0    Assessment    Goals:   Short Term Goals:  4 weeks  1. Patient will be compliant with HEP to promote the independent management of current diagnosis. met  2. Patient will increase left shoulder flexion to 160 degrees to improve reaching into overhead cabinets.  met  3. Patient will increase left shoulder functiona IR to reach T10 for function of dressing. met  4. Patient will report a decrease in complaints of left shoulder pain to 5/10 during performance of ADL's for independence of self care activities.   not met        Long Term Goals:  8 weeks  1. Patient will increase left shoulder strength to 4+/5 to improve tolerance to normal house work. not met  2. Patient will increase left shoulder flexion to 170 degrees in order to place dishes in overhead cabinets independently for self care independence.  not met  3. Patient will increase left shoulder functional IR to reach T7 for function of dressing. met  4. Patient will increase scapular stabilization strength to 4+/5 to improve tolerance to normal lifting. not met  5. Patient will improve FOTO limitation status from 41% to 34% placing the patient in the 20-40% impaired, limited, or restricted category indicating increased functional mobility.  not met    Discharge reason: Patient has reached the maximum rehab potential for the present time    Plan   This patient is discharged from Physical Therapy

## 2020-12-14 NOTE — PROGRESS NOTES
CC: Left shoulder F/U     75 y.o. Female returns today for follow-up of of her left shoulder.  She complains of continued pain over the lateral subdeltoid recess and also deep in the shoulder.  Worse with overhead activity.  Better with rest.  It bothers her on a daily basis.  Activity limiting.  Also wakes her up at night.  Treatment to this point has included a left subacromial corticosteroid injection in September which provided around 50% pain relief.  She also has had some therapy, oral medication, and activity modifications.  Pain has been present off and on for many years.  No neck pain today.  She recently has had some numbness and tingling complaints down the arm but she describes this as a separate complaint.  Pain Score: 0-No pain  PAST MEDICAL HISTORY:   Past Medical History:   Diagnosis Date    Allergy     Anxiety     Aortic atherosclerosis: see CT scan 7/18 8/21/2018    Arthritis     Cataract     Depression     Diverticulosis 7/21/2015    GERD (gastroesophageal reflux disease)     Hyperlipidemia     Joint pain     Liver cyst: see CT 2009 6/21/2016    Lower back pain 7/29/2019    Lung cyst: R side see CT 2009 6/21/2016    Nuclear sclerosis - Both Eyes 2/14/2014    Osteoporosis     Primary insomnia 6/21/2016    Primary insomnia 6/21/2016     PAST SURGICAL HISTORY:  Past Surgical History:   Procedure Laterality Date    CHOLECYSTECTOMY      DILATION AND CURETTAGE OF UTERUS      WISDOM TOOTH EXTRACTION       FAMILY HISTORY:  Family History   Problem Relation Age of Onset    Stroke Mother     Heart disease Mother         rheumatic fever    Cancer Father         Bladder    COPD Father     Heart disease Father         CHF    Kidney disease Father     Diabetes Father     Hearing loss Father     Thrombosis Maternal Grandfather     Cancer Paternal Grandmother         stomach    [place]'s disease Paternal Grandfather     No Known Problems Son     Skin cancer Paternal Aunt      Melanoma Neg Hx     Amblyopia Neg Hx     Blindness Neg Hx     Cataracts Neg Hx     Glaucoma Neg Hx     Hypertension Neg Hx     Macular degeneration Neg Hx     Retinal detachment Neg Hx     Strabismus Neg Hx     Thyroid disease Neg Hx      MEDICATIONS:    Current Outpatient Medications:     acetaminophen (TYLENOL) 500 mg Cap, , Disp: , Rfl:     calcium citrate-vitamin D2 1,500-200 mg-unit Tab, , Disp: , Rfl:     clotrimazole-betamethasone 1-0.05% (LOTRISONE) cream, Apply to affected area 2 times daily, Disp: 15 g, Rfl: 1    CRESTOR 10 mg tablet, , Disp: , Rfl:     denosumab (PROLIA) 60 mg/mL Syrg, Inject 60 mg into the skin every 6 (six) months., Disp: , Rfl:     fish oil-omega-3 fatty acids 300-1,000 mg capsule, Take 2 g by mouth once daily. Takes 2 times daily, Disp: , Rfl:     FLAXSEED ORAL, Take 1 application by mouth once daily at 6am. Add seeds to oatmeal, Disp: , Rfl:     multivitamin (THERAGRAN) per tablet, Take 1 tablet by mouth once daily., Disp: , Rfl:     estradioL (ESTRACE) 0.01 % (0.1 mg/gram) vaginal cream, Place 1 g vaginally twice a week., Disp: 42.5 g, Rfl: 1    LORazepam (ATIVAN) 0.5 MG tablet, Take 1 tablet (0.5 mg total) by mouth as needed for Anxiety (May take 1-2 x weekly for anxiety or for travel)., Disp: 30 tablet, Rfl: 0    ALLERGIES:  Review of patient's allergies indicates:   Allergen Reactions    Fosamax [alendronate] Nausea Only    Nsaids (non-steroidal anti-inflammatory drug) Hives    Sulfa (sulfonamide antibiotics) Hives     REVIEW OF SYSTEMS:  Constitution: Negative. Negative for chills, fever and night sweats.    Hematologic/Lymphatic: Negative for bleeding problem. Does not bruise/bleed easily.   Skin: Negative for dry skin, itching and rash.   Musculoskeletal: Negative for falls. Positive for left shoulder pain and muscle weakness.     All other review of symptoms were reviewed and found to be noncontributory.    PHYSICAL EXAMINATION:  Vitals:  BP (!) 141/71    Pulse 70   Ht 5' (1.524 m)   Wt 72.1 kg (159 lb)   BMI 31.05 kg/m²    General: Well-developed well-nourished 75 y.o. femalein no acute distress   Cardiovascular: Regular rhythm by palpation of distal pulse, normal color and temperature, no concerning varicosities on symptomatic side   Lungs: No labored breathing or wheezing appreciated   Neuro: Alert and oriented ×3   Psychiatric: well oriented to person, place and time, demonstrates normal mood and affect   Skin: No rashes, lesions or ulcers, normal temperature, turgor, and texture on uninvolved extremity    Ortho/SPM Exam  Examination of the left shoulder demonstrates active forward elevation to 150, ER with arm at side to 50, IR to T7. Passive FE to 160, ER to 60. Prominent tenderness along the proximal biceps tendon. Negative AC tenderness. 4/5 resisted supraspinatus testing. 4/5 resisted infraspinatus testing. Negative belly press test. Stable shoulder.  +Neer / Ching / + Compression rotation, +Posterior gh pain +[place] point pain.     No midline neck tenderness. Negative Spurling's maneuver.      IMAGING:  Prior Xrays including AP, Outlet and Axillary Lateral of bilateral shoulders are ordered / images reviewed by me:              Sclerosis over the greater tuberosity and the acromion undersurface more prominent on the right side.  Perhaps subtle proximal humeral head migration on Grashey view on the right.  Bilateral AC joint DJD.      ASSESSMENT:      ICD-10-CM ICD-9-CM   1. Chronic left shoulder pain  M25.512 719.41    G89.29 338.29     PLAN:     Findings discussed with the patient.  She has continued pain in the shoulder despite initial conservative treatment.  Findings suggestive of possible rotator cuff pathology.  Impingement findings also present.  Pain has been present for several years.  MRI is indicated at this time for further assessment.  Return to clinic after study to discuss results and treatment options.    Procedures

## 2020-12-17 ENCOUNTER — OFFICE VISIT (OUTPATIENT)
Dept: SPORTS MEDICINE | Facility: CLINIC | Age: 75
End: 2020-12-17
Payer: MEDICARE

## 2020-12-17 VITALS
SYSTOLIC BLOOD PRESSURE: 141 MMHG | HEIGHT: 60 IN | DIASTOLIC BLOOD PRESSURE: 71 MMHG | WEIGHT: 159 LBS | BODY MASS INDEX: 31.22 KG/M2 | HEART RATE: 70 BPM

## 2020-12-17 DIAGNOSIS — M25.512 CHRONIC LEFT SHOULDER PAIN: Primary | ICD-10-CM

## 2020-12-17 DIAGNOSIS — G89.29 CHRONIC LEFT SHOULDER PAIN: Primary | ICD-10-CM

## 2020-12-17 PROCEDURE — 99213 PR OFFICE/OUTPT VISIT, EST, LEVL III, 20-29 MIN: ICD-10-PCS | Mod: S$GLB,,, | Performed by: ORTHOPAEDIC SURGERY

## 2020-12-17 PROCEDURE — 1101F PT FALLS ASSESS-DOCD LE1/YR: CPT | Mod: CPTII,S$GLB,, | Performed by: ORTHOPAEDIC SURGERY

## 2020-12-17 PROCEDURE — 99999 PR PBB SHADOW E&M-EST. PATIENT-LVL III: ICD-10-PCS | Mod: PBBFAC,,, | Performed by: ORTHOPAEDIC SURGERY

## 2020-12-17 PROCEDURE — 1159F PR MEDICATION LIST DOCUMENTED IN MEDICAL RECORD: ICD-10-PCS | Mod: S$GLB,,, | Performed by: ORTHOPAEDIC SURGERY

## 2020-12-17 PROCEDURE — 1159F MED LIST DOCD IN RCRD: CPT | Mod: S$GLB,,, | Performed by: ORTHOPAEDIC SURGERY

## 2020-12-17 PROCEDURE — 99213 OFFICE O/P EST LOW 20 MIN: CPT | Mod: S$GLB,,, | Performed by: ORTHOPAEDIC SURGERY

## 2020-12-17 PROCEDURE — 3288F PR FALLS RISK ASSESSMENT DOCUMENTED: ICD-10-PCS | Mod: CPTII,S$GLB,, | Performed by: ORTHOPAEDIC SURGERY

## 2020-12-17 PROCEDURE — 1126F PR PAIN SEVERITY QUANTIFIED, NO PAIN PRESENT: ICD-10-PCS | Mod: S$GLB,,, | Performed by: ORTHOPAEDIC SURGERY

## 2020-12-17 PROCEDURE — 1126F AMNT PAIN NOTED NONE PRSNT: CPT | Mod: S$GLB,,, | Performed by: ORTHOPAEDIC SURGERY

## 2020-12-17 PROCEDURE — 99999 PR PBB SHADOW E&M-EST. PATIENT-LVL III: CPT | Mod: PBBFAC,,, | Performed by: ORTHOPAEDIC SURGERY

## 2020-12-17 PROCEDURE — 3288F FALL RISK ASSESSMENT DOCD: CPT | Mod: CPTII,S$GLB,, | Performed by: ORTHOPAEDIC SURGERY

## 2020-12-17 PROCEDURE — 1101F PR PT FALLS ASSESS DOC 0-1 FALLS W/OUT INJ PAST YR: ICD-10-PCS | Mod: CPTII,S$GLB,, | Performed by: ORTHOPAEDIC SURGERY

## 2020-12-26 ENCOUNTER — HOSPITAL ENCOUNTER (OUTPATIENT)
Dept: RADIOLOGY | Facility: HOSPITAL | Age: 75
Discharge: HOME | End: 2020-12-26
Attending: STUDENT IN AN ORGANIZED HEALTH CARE EDUCATION/TRAINING PROGRAM
Payer: MEDICARE

## 2020-12-26 DIAGNOSIS — M25.512 CHRONIC LEFT SHOULDER PAIN: ICD-10-CM

## 2020-12-26 DIAGNOSIS — G89.29 CHRONIC LEFT SHOULDER PAIN: ICD-10-CM

## 2020-12-26 PROCEDURE — 73221 MRI SHOULDER WITHOUT CONTRAST LEFT: ICD-10-PCS | Mod: 26,LT,, | Performed by: RADIOLOGY

## 2020-12-26 PROCEDURE — 73221 MRI JOINT UPR EXTREM W/O DYE: CPT | Mod: 26,LT,, | Performed by: RADIOLOGY

## 2020-12-26 PROCEDURE — 73221 MRI JOINT UPR EXTREM W/O DYE: CPT | Mod: TC,LT

## 2020-12-31 ENCOUNTER — OFFICE VISIT (OUTPATIENT)
Dept: SPORTS MEDICINE | Facility: CLINIC | Age: 75
End: 2020-12-31
Payer: MEDICARE

## 2020-12-31 VITALS
WEIGHT: 158.63 LBS | SYSTOLIC BLOOD PRESSURE: 140 MMHG | DIASTOLIC BLOOD PRESSURE: 68 MMHG | HEIGHT: 60 IN | BODY MASS INDEX: 31.14 KG/M2 | HEART RATE: 71 BPM

## 2020-12-31 DIAGNOSIS — M75.42 IMPINGEMENT SYNDROME OF LEFT SHOULDER: ICD-10-CM

## 2020-12-31 DIAGNOSIS — M25.512 TRIGGER POINT OF LEFT SHOULDER REGION: Primary | ICD-10-CM

## 2020-12-31 DIAGNOSIS — M75.52 SUBACROMIAL BURSITIS OF LEFT SHOULDER JOINT: ICD-10-CM

## 2020-12-31 PROCEDURE — 99213 OFFICE O/P EST LOW 20 MIN: CPT | Mod: 25,S$GLB,, | Performed by: ORTHOPAEDIC SURGERY

## 2020-12-31 PROCEDURE — 1159F MED LIST DOCD IN RCRD: CPT | Mod: S$GLB,,, | Performed by: ORTHOPAEDIC SURGERY

## 2020-12-31 PROCEDURE — 1159F PR MEDICATION LIST DOCUMENTED IN MEDICAL RECORD: ICD-10-PCS | Mod: S$GLB,,, | Performed by: ORTHOPAEDIC SURGERY

## 2020-12-31 PROCEDURE — 1125F PR PAIN SEVERITY QUANTIFIED, PAIN PRESENT: ICD-10-PCS | Mod: S$GLB,,, | Performed by: ORTHOPAEDIC SURGERY

## 2020-12-31 PROCEDURE — 1101F PT FALLS ASSESS-DOCD LE1/YR: CPT | Mod: CPTII,S$GLB,, | Performed by: ORTHOPAEDIC SURGERY

## 2020-12-31 PROCEDURE — 99999 PR PBB SHADOW E&M-EST. PATIENT-LVL III: CPT | Mod: PBBFAC,,, | Performed by: ORTHOPAEDIC SURGERY

## 2020-12-31 PROCEDURE — 96372 THER/PROPH/DIAG INJ SC/IM: CPT | Mod: S$GLB,,, | Performed by: ORTHOPAEDIC SURGERY

## 2020-12-31 PROCEDURE — 96372 PR INJECTION,THERAP/PROPH/DIAG2ST, IM OR SUBCUT: ICD-10-PCS | Mod: S$GLB,,, | Performed by: ORTHOPAEDIC SURGERY

## 2020-12-31 PROCEDURE — 3288F PR FALLS RISK ASSESSMENT DOCUMENTED: ICD-10-PCS | Mod: CPTII,S$GLB,, | Performed by: ORTHOPAEDIC SURGERY

## 2020-12-31 PROCEDURE — 99213 PR OFFICE/OUTPT VISIT, EST, LEVL III, 20-29 MIN: ICD-10-PCS | Mod: 25,S$GLB,, | Performed by: ORTHOPAEDIC SURGERY

## 2020-12-31 PROCEDURE — 1101F PR PT FALLS ASSESS DOC 0-1 FALLS W/OUT INJ PAST YR: ICD-10-PCS | Mod: CPTII,S$GLB,, | Performed by: ORTHOPAEDIC SURGERY

## 2020-12-31 PROCEDURE — 1125F AMNT PAIN NOTED PAIN PRSNT: CPT | Mod: S$GLB,,, | Performed by: ORTHOPAEDIC SURGERY

## 2020-12-31 PROCEDURE — 99999 PR PBB SHADOW E&M-EST. PATIENT-LVL III: ICD-10-PCS | Mod: PBBFAC,,, | Performed by: ORTHOPAEDIC SURGERY

## 2020-12-31 PROCEDURE — 3288F FALL RISK ASSESSMENT DOCD: CPT | Mod: CPTII,S$GLB,, | Performed by: ORTHOPAEDIC SURGERY

## 2020-12-31 RX ADMIN — TRIAMCINOLONE ACETONIDE 40 MG: 40 INJECTION, SUSPENSION INTRA-ARTICULAR; INTRAMUSCULAR at 10:12

## 2021-01-01 ENCOUNTER — PATIENT MESSAGE (OUTPATIENT)
Dept: SPORTS MEDICINE | Facility: CLINIC | Age: 76
End: 2021-01-01

## 2021-01-01 RX ORDER — TRIAMCINOLONE ACETONIDE 40 MG/ML
40 INJECTION, SUSPENSION INTRA-ARTICULAR; INTRAMUSCULAR
Status: DISCONTINUED | OUTPATIENT
Start: 2020-12-31 | End: 2021-01-01 | Stop reason: HOSPADM

## 2021-01-02 ENCOUNTER — PATIENT MESSAGE (OUTPATIENT)
Dept: SPORTS MEDICINE | Facility: CLINIC | Age: 76
End: 2021-01-02

## 2021-01-09 ENCOUNTER — IMMUNIZATION (OUTPATIENT)
Dept: INTERNAL MEDICINE | Facility: CLINIC | Age: 76
End: 2021-01-09
Payer: MEDICARE

## 2021-01-09 DIAGNOSIS — Z23 NEED FOR VACCINATION: ICD-10-CM

## 2021-01-09 PROCEDURE — 91300 COVID-19, MRNA, LNP-S, PF, 30 MCG/0.3 ML DOSE VACCINE: CPT | Mod: PBBFAC | Performed by: FAMILY MEDICINE

## 2021-01-30 ENCOUNTER — IMMUNIZATION (OUTPATIENT)
Dept: INTERNAL MEDICINE | Facility: CLINIC | Age: 76
End: 2021-01-30
Payer: MEDICARE

## 2021-01-30 DIAGNOSIS — Z23 NEED FOR VACCINATION: Primary | ICD-10-CM

## 2021-01-30 PROCEDURE — 91300 COVID-19, MRNA, LNP-S, PF, 30 MCG/0.3 ML DOSE VACCINE: CPT | Mod: PBBFAC | Performed by: FAMILY MEDICINE

## 2021-01-30 PROCEDURE — 0002A COVID-19, MRNA, LNP-S, PF, 30 MCG/0.3 ML DOSE VACCINE: CPT | Mod: PBBFAC | Performed by: FAMILY MEDICINE

## 2021-02-23 ENCOUNTER — OFFICE VISIT (OUTPATIENT)
Dept: OPTOMETRY | Facility: CLINIC | Age: 76
End: 2021-02-23
Payer: MEDICARE

## 2021-02-23 DIAGNOSIS — H25.13 NUCLEAR SCLEROSIS, BILATERAL: Primary | ICD-10-CM

## 2021-02-23 DIAGNOSIS — H52.4 BILATERAL PRESBYOPIA: ICD-10-CM

## 2021-02-23 DIAGNOSIS — H04.123 BILATERAL DRY EYES: ICD-10-CM

## 2021-02-23 PROCEDURE — 92015 DETERMINE REFRACTIVE STATE: CPT | Mod: S$GLB,,, | Performed by: OPTOMETRIST

## 2021-02-23 PROCEDURE — 92014 COMPRE OPH EXAM EST PT 1/>: CPT | Mod: S$GLB,,, | Performed by: OPTOMETRIST

## 2021-02-23 PROCEDURE — 92014 PR EYE EXAM, EST PATIENT,COMPREHESV: ICD-10-PCS | Mod: S$GLB,,, | Performed by: OPTOMETRIST

## 2021-02-23 PROCEDURE — 99999 PR PBB SHADOW E&M-EST. PATIENT-LVL II: ICD-10-PCS | Mod: PBBFAC,,, | Performed by: OPTOMETRIST

## 2021-02-23 PROCEDURE — 92015 PR REFRACTION: ICD-10-PCS | Mod: S$GLB,,, | Performed by: OPTOMETRIST

## 2021-02-23 PROCEDURE — 99999 PR PBB SHADOW E&M-EST. PATIENT-LVL II: CPT | Mod: PBBFAC,,, | Performed by: OPTOMETRIST

## 2021-03-05 ENCOUNTER — PATIENT MESSAGE (OUTPATIENT)
Dept: OBSTETRICS AND GYNECOLOGY | Facility: CLINIC | Age: 76
End: 2021-03-05

## 2021-03-05 DIAGNOSIS — N76.0 ACUTE VAGINITIS: ICD-10-CM

## 2021-03-05 RX ORDER — CLOTRIMAZOLE AND BETAMETHASONE DIPROPIONATE 10; .64 MG/G; MG/G
CREAM TOPICAL
Qty: 45 G | Refills: 1 | Status: SHIPPED | OUTPATIENT
Start: 2021-03-05 | End: 2022-06-13 | Stop reason: SDUPTHER

## 2021-06-02 ENCOUNTER — INFUSION (OUTPATIENT)
Dept: INFECTIOUS DISEASES | Facility: HOSPITAL | Age: 76
End: 2021-06-02
Payer: MEDICARE

## 2021-06-02 VITALS
BODY MASS INDEX: 31.34 KG/M2 | WEIGHT: 159.63 LBS | SYSTOLIC BLOOD PRESSURE: 155 MMHG | OXYGEN SATURATION: 97 % | DIASTOLIC BLOOD PRESSURE: 70 MMHG | TEMPERATURE: 98 F | HEART RATE: 64 BPM | RESPIRATION RATE: 18 BRPM | HEIGHT: 60 IN

## 2021-06-02 DIAGNOSIS — M81.0 AGE-RELATED OSTEOPOROSIS WITHOUT CURRENT PATHOLOGICAL FRACTURE: Primary | ICD-10-CM

## 2021-06-02 PROCEDURE — 96372 THER/PROPH/DIAG INJ SC/IM: CPT

## 2021-06-02 PROCEDURE — 63600175 PHARM REV CODE 636 W HCPCS: Mod: JG | Performed by: NURSE PRACTITIONER

## 2021-06-02 RX ADMIN — DENOSUMAB 60 MG: 60 INJECTION SUBCUTANEOUS at 10:06

## 2021-07-15 DIAGNOSIS — I71.21 ASCENDING AORTIC ANEURYSM: Primary | ICD-10-CM

## 2021-07-20 ENCOUNTER — PATIENT MESSAGE (OUTPATIENT)
Dept: CARDIOTHORACIC SURGERY | Facility: CLINIC | Age: 76
End: 2021-07-20

## 2021-08-18 ENCOUNTER — PATIENT OUTREACH (OUTPATIENT)
Dept: ADMINISTRATIVE | Facility: OTHER | Age: 76
End: 2021-08-18

## 2021-08-19 ENCOUNTER — OFFICE VISIT (OUTPATIENT)
Dept: CARDIOTHORACIC SURGERY | Facility: CLINIC | Age: 76
End: 2021-08-19
Payer: MEDICARE

## 2021-08-19 ENCOUNTER — HOSPITAL ENCOUNTER (OUTPATIENT)
Dept: RADIOLOGY | Facility: HOSPITAL | Age: 76
Discharge: HOME | End: 2021-08-19
Attending: THORACIC SURGERY (CARDIOTHORACIC VASCULAR SURGERY)
Payer: MEDICARE

## 2021-08-19 VITALS
HEART RATE: 73 BPM | WEIGHT: 159.5 LBS | SYSTOLIC BLOOD PRESSURE: 146 MMHG | DIASTOLIC BLOOD PRESSURE: 67 MMHG | BODY MASS INDEX: 31.31 KG/M2 | HEIGHT: 60 IN | OXYGEN SATURATION: 98 % | TEMPERATURE: 98 F

## 2021-08-19 DIAGNOSIS — I77.819 AORTIC ECTASIA: Primary | ICD-10-CM

## 2021-08-19 DIAGNOSIS — I71.21 ASCENDING AORTIC ANEURYSM: ICD-10-CM

## 2021-08-19 PROCEDURE — 71250 CT CHEST WITHOUT CONTRAST: ICD-10-PCS | Mod: 26,,, | Performed by: INTERNAL MEDICINE

## 2021-08-19 PROCEDURE — 3077F SYST BP >= 140 MM HG: CPT | Mod: CPTII,S$GLB,, | Performed by: THORACIC SURGERY (CARDIOTHORACIC VASCULAR SURGERY)

## 2021-08-19 PROCEDURE — 1126F AMNT PAIN NOTED NONE PRSNT: CPT | Mod: CPTII,S$GLB,, | Performed by: THORACIC SURGERY (CARDIOTHORACIC VASCULAR SURGERY)

## 2021-08-19 PROCEDURE — 3078F PR MOST RECENT DIASTOLIC BLOOD PRESSURE < 80 MM HG: ICD-10-PCS | Mod: CPTII,S$GLB,, | Performed by: THORACIC SURGERY (CARDIOTHORACIC VASCULAR SURGERY)

## 2021-08-19 PROCEDURE — 71250 CT THORAX DX C-: CPT | Mod: TC

## 2021-08-19 PROCEDURE — 1160F PR REVIEW ALL MEDS BY PRESCRIBER/CLIN PHARMACIST DOCUMENTED: ICD-10-PCS | Mod: CPTII,S$GLB,, | Performed by: THORACIC SURGERY (CARDIOTHORACIC VASCULAR SURGERY)

## 2021-08-19 PROCEDURE — 3078F DIAST BP <80 MM HG: CPT | Mod: CPTII,S$GLB,, | Performed by: THORACIC SURGERY (CARDIOTHORACIC VASCULAR SURGERY)

## 2021-08-19 PROCEDURE — 71250 CT THORAX DX C-: CPT | Mod: 26,,, | Performed by: INTERNAL MEDICINE

## 2021-08-19 PROCEDURE — 99499 RISK ADDL DX/OHS AUDIT: ICD-10-PCS | Mod: S$GLB,,, | Performed by: THORACIC SURGERY (CARDIOTHORACIC VASCULAR SURGERY)

## 2021-08-19 PROCEDURE — 1126F PR PAIN SEVERITY QUANTIFIED, NO PAIN PRESENT: ICD-10-PCS | Mod: CPTII,S$GLB,, | Performed by: THORACIC SURGERY (CARDIOTHORACIC VASCULAR SURGERY)

## 2021-08-19 PROCEDURE — 3288F FALL RISK ASSESSMENT DOCD: CPT | Mod: CPTII,S$GLB,, | Performed by: THORACIC SURGERY (CARDIOTHORACIC VASCULAR SURGERY)

## 2021-08-19 PROCEDURE — 1101F PT FALLS ASSESS-DOCD LE1/YR: CPT | Mod: CPTII,S$GLB,, | Performed by: THORACIC SURGERY (CARDIOTHORACIC VASCULAR SURGERY)

## 2021-08-19 PROCEDURE — 99214 PR OFFICE/OUTPT VISIT, EST, LEVL IV, 30-39 MIN: ICD-10-PCS | Mod: S$GLB,,, | Performed by: THORACIC SURGERY (CARDIOTHORACIC VASCULAR SURGERY)

## 2021-08-19 PROCEDURE — 1159F MED LIST DOCD IN RCRD: CPT | Mod: CPTII,S$GLB,, | Performed by: THORACIC SURGERY (CARDIOTHORACIC VASCULAR SURGERY)

## 2021-08-19 PROCEDURE — 99214 OFFICE O/P EST MOD 30 MIN: CPT | Mod: S$GLB,,, | Performed by: THORACIC SURGERY (CARDIOTHORACIC VASCULAR SURGERY)

## 2021-08-19 PROCEDURE — 3077F PR MOST RECENT SYSTOLIC BLOOD PRESSURE >= 140 MM HG: ICD-10-PCS | Mod: CPTII,S$GLB,, | Performed by: THORACIC SURGERY (CARDIOTHORACIC VASCULAR SURGERY)

## 2021-08-19 PROCEDURE — 99999 PR PBB SHADOW E&M-EST. PATIENT-LVL IV: CPT | Mod: PBBFAC,,, | Performed by: THORACIC SURGERY (CARDIOTHORACIC VASCULAR SURGERY)

## 2021-08-19 PROCEDURE — 3288F PR FALLS RISK ASSESSMENT DOCUMENTED: ICD-10-PCS | Mod: CPTII,S$GLB,, | Performed by: THORACIC SURGERY (CARDIOTHORACIC VASCULAR SURGERY)

## 2021-08-19 PROCEDURE — 1101F PR PT FALLS ASSESS DOC 0-1 FALLS W/OUT INJ PAST YR: ICD-10-PCS | Mod: CPTII,S$GLB,, | Performed by: THORACIC SURGERY (CARDIOTHORACIC VASCULAR SURGERY)

## 2021-08-19 PROCEDURE — 99999 PR PBB SHADOW E&M-EST. PATIENT-LVL IV: ICD-10-PCS | Mod: PBBFAC,,, | Performed by: THORACIC SURGERY (CARDIOTHORACIC VASCULAR SURGERY)

## 2021-08-19 PROCEDURE — 99499 UNLISTED E&M SERVICE: CPT | Mod: S$GLB,,, | Performed by: THORACIC SURGERY (CARDIOTHORACIC VASCULAR SURGERY)

## 2021-08-19 PROCEDURE — 1159F PR MEDICATION LIST DOCUMENTED IN MEDICAL RECORD: ICD-10-PCS | Mod: CPTII,S$GLB,, | Performed by: THORACIC SURGERY (CARDIOTHORACIC VASCULAR SURGERY)

## 2021-08-19 PROCEDURE — 1160F RVW MEDS BY RX/DR IN RCRD: CPT | Mod: CPTII,S$GLB,, | Performed by: THORACIC SURGERY (CARDIOTHORACIC VASCULAR SURGERY)

## 2021-08-20 DIAGNOSIS — I35.8 OTHER NONRHEUMATIC AORTIC VALVE DISORDERS: ICD-10-CM

## 2021-08-20 DIAGNOSIS — I35.8 OTHER NONRHEUMATIC AORTIC VALVE DISORDERS: Primary | ICD-10-CM

## 2021-09-20 ENCOUNTER — PATIENT MESSAGE (OUTPATIENT)
Dept: CARDIOTHORACIC SURGERY | Facility: CLINIC | Age: 76
End: 2021-09-20

## 2021-09-20 DIAGNOSIS — R73.01 IFG (IMPAIRED FASTING GLUCOSE): ICD-10-CM

## 2021-09-20 DIAGNOSIS — M81.0 AGE-RELATED OSTEOPOROSIS WITHOUT CURRENT PATHOLOGICAL FRACTURE: ICD-10-CM

## 2021-09-20 DIAGNOSIS — R53.83 FATIGUE, UNSPECIFIED TYPE: ICD-10-CM

## 2021-09-20 DIAGNOSIS — E78.2 MIXED HYPERLIPIDEMIA: ICD-10-CM

## 2021-09-20 DIAGNOSIS — E55.9 VITAMIN D DEFICIENCY DISEASE: ICD-10-CM

## 2021-09-20 DIAGNOSIS — Z12.31 SCREENING MAMMOGRAM, ENCOUNTER FOR: Primary | ICD-10-CM

## 2021-09-20 RX ORDER — ZOSTER VACCINE RECOMBINANT, ADJUVANTED 50 MCG/0.5
KIT INTRAMUSCULAR
COMMUNITY
Start: 2021-04-12 | End: 2021-10-05

## 2021-09-21 ENCOUNTER — PATIENT MESSAGE (OUTPATIENT)
Dept: INTERNAL MEDICINE | Facility: CLINIC | Age: 76
End: 2021-09-21

## 2021-09-28 ENCOUNTER — HOSPITAL ENCOUNTER (OUTPATIENT)
Dept: RADIOLOGY | Facility: HOSPITAL | Age: 76
Discharge: HOME | End: 2021-09-28
Attending: INTERNAL MEDICINE
Payer: MEDICARE

## 2021-09-28 VITALS — HEIGHT: 61 IN | WEIGHT: 158 LBS | BODY MASS INDEX: 29.83 KG/M2

## 2021-09-28 DIAGNOSIS — Z12.31 SCREENING MAMMOGRAM, ENCOUNTER FOR: ICD-10-CM

## 2021-09-28 PROCEDURE — 77067 MAMMO DIGITAL SCREENING BILAT WITH TOMO: ICD-10-PCS | Mod: 26,,, | Performed by: RADIOLOGY

## 2021-09-28 PROCEDURE — 77067 SCR MAMMO BI INCL CAD: CPT | Mod: TC

## 2021-09-28 PROCEDURE — 77067 SCR MAMMO BI INCL CAD: CPT | Mod: 26,,, | Performed by: RADIOLOGY

## 2021-09-28 PROCEDURE — 77063 BREAST TOMOSYNTHESIS BI: CPT | Mod: 26,,, | Performed by: RADIOLOGY

## 2021-09-28 PROCEDURE — 77063 MAMMO DIGITAL SCREENING BILAT WITH TOMO: ICD-10-PCS | Mod: 26,,, | Performed by: RADIOLOGY

## 2021-10-01 ENCOUNTER — HOSPITAL ENCOUNTER (OUTPATIENT)
Dept: RADIOLOGY | Facility: CLINIC | Age: 76
Discharge: HOME | End: 2021-10-01
Attending: INTERNAL MEDICINE
Payer: MEDICARE

## 2021-10-01 DIAGNOSIS — M81.0 AGE-RELATED OSTEOPOROSIS WITHOUT CURRENT PATHOLOGICAL FRACTURE: ICD-10-CM

## 2021-10-01 PROCEDURE — 77080 DEXA BONE DENSITY SPINE HIP: ICD-10-PCS | Mod: 26,,, | Performed by: INTERNAL MEDICINE

## 2021-10-01 PROCEDURE — 77080 DXA BONE DENSITY AXIAL: CPT | Mod: TC

## 2021-10-01 PROCEDURE — 77080 DXA BONE DENSITY AXIAL: CPT | Mod: 26,,, | Performed by: INTERNAL MEDICINE

## 2021-10-05 ENCOUNTER — IMMUNIZATION (OUTPATIENT)
Dept: INTERNAL MEDICINE | Facility: CLINIC | Age: 76
End: 2021-10-05
Payer: MEDICARE

## 2021-10-05 ENCOUNTER — OFFICE VISIT (OUTPATIENT)
Dept: INTERNAL MEDICINE | Facility: CLINIC | Age: 76
End: 2021-10-05
Payer: MEDICARE

## 2021-10-05 VITALS
HEIGHT: 61 IN | BODY MASS INDEX: 29.45 KG/M2 | WEIGHT: 156 LBS | DIASTOLIC BLOOD PRESSURE: 75 MMHG | SYSTOLIC BLOOD PRESSURE: 125 MMHG

## 2021-10-05 DIAGNOSIS — I77.819 AORTIC ECTASIA: ICD-10-CM

## 2021-10-05 DIAGNOSIS — M81.0 AGE-RELATED OSTEOPOROSIS WITHOUT CURRENT PATHOLOGICAL FRACTURE: ICD-10-CM

## 2021-10-05 DIAGNOSIS — Z00.00 ANNUAL PHYSICAL EXAM: Primary | ICD-10-CM

## 2021-10-05 DIAGNOSIS — K76.89 LIVER CYST: ICD-10-CM

## 2021-10-05 DIAGNOSIS — Z23 NEED FOR VACCINATION: Primary | ICD-10-CM

## 2021-10-05 DIAGNOSIS — E78.2 MIXED HYPERLIPIDEMIA: ICD-10-CM

## 2021-10-05 DIAGNOSIS — K57.50 DIVERTICULOSIS OF BOTH SMALL AND LARGE INTESTINE WITHOUT BLEEDING: ICD-10-CM

## 2021-10-05 PROCEDURE — 1160F PR REVIEW ALL MEDS BY PRESCRIBER/CLIN PHARMACIST DOCUMENTED: ICD-10-PCS | Mod: CPTII,S$GLB,, | Performed by: INTERNAL MEDICINE

## 2021-10-05 PROCEDURE — 3288F FALL RISK ASSESSMENT DOCD: CPT | Mod: CPTII,S$GLB,, | Performed by: INTERNAL MEDICINE

## 2021-10-05 PROCEDURE — 3078F DIAST BP <80 MM HG: CPT | Mod: CPTII,S$GLB,, | Performed by: INTERNAL MEDICINE

## 2021-10-05 PROCEDURE — 99214 OFFICE O/P EST MOD 30 MIN: CPT | Mod: S$GLB,,, | Performed by: INTERNAL MEDICINE

## 2021-10-05 PROCEDURE — 1125F AMNT PAIN NOTED PAIN PRSNT: CPT | Mod: CPTII,S$GLB,, | Performed by: INTERNAL MEDICINE

## 2021-10-05 PROCEDURE — 99214 PR OFFICE/OUTPT VISIT, EST, LEVL IV, 30-39 MIN: ICD-10-PCS | Mod: S$GLB,,, | Performed by: INTERNAL MEDICINE

## 2021-10-05 PROCEDURE — 99999 PR PBB SHADOW E&M-EST. PATIENT-LVL III: CPT | Mod: PBBFAC,,, | Performed by: INTERNAL MEDICINE

## 2021-10-05 PROCEDURE — 0003A COVID-19, MRNA, LNP-S, PF, 30 MCG/0.3 ML DOSE VACCINE: CPT | Mod: CV19,PBBFAC | Performed by: INTERNAL MEDICINE

## 2021-10-05 PROCEDURE — 3074F SYST BP LT 130 MM HG: CPT | Mod: CPTII,S$GLB,, | Performed by: INTERNAL MEDICINE

## 2021-10-05 PROCEDURE — 99999 PR PBB SHADOW E&M-EST. PATIENT-LVL III: ICD-10-PCS | Mod: PBBFAC,,, | Performed by: INTERNAL MEDICINE

## 2021-10-05 PROCEDURE — 1125F PR PAIN SEVERITY QUANTIFIED, PAIN PRESENT: ICD-10-PCS | Mod: CPTII,S$GLB,, | Performed by: INTERNAL MEDICINE

## 2021-10-05 PROCEDURE — 1159F MED LIST DOCD IN RCRD: CPT | Mod: CPTII,S$GLB,, | Performed by: INTERNAL MEDICINE

## 2021-10-05 PROCEDURE — 1101F PT FALLS ASSESS-DOCD LE1/YR: CPT | Mod: CPTII,S$GLB,, | Performed by: INTERNAL MEDICINE

## 2021-10-05 PROCEDURE — 3288F PR FALLS RISK ASSESSMENT DOCUMENTED: ICD-10-PCS | Mod: CPTII,S$GLB,, | Performed by: INTERNAL MEDICINE

## 2021-10-05 PROCEDURE — 1159F PR MEDICATION LIST DOCUMENTED IN MEDICAL RECORD: ICD-10-PCS | Mod: CPTII,S$GLB,, | Performed by: INTERNAL MEDICINE

## 2021-10-05 PROCEDURE — 1160F RVW MEDS BY RX/DR IN RCRD: CPT | Mod: CPTII,S$GLB,, | Performed by: INTERNAL MEDICINE

## 2021-10-05 PROCEDURE — 91300 COVID-19, MRNA, LNP-S, PF, 30 MCG/0.3 ML DOSE VACCINE: CPT | Mod: PBBFAC | Performed by: INTERNAL MEDICINE

## 2021-10-05 PROCEDURE — 3074F PR MOST RECENT SYSTOLIC BLOOD PRESSURE < 130 MM HG: ICD-10-PCS | Mod: CPTII,S$GLB,, | Performed by: INTERNAL MEDICINE

## 2021-10-05 PROCEDURE — 1101F PR PT FALLS ASSESS DOC 0-1 FALLS W/OUT INJ PAST YR: ICD-10-PCS | Mod: CPTII,S$GLB,, | Performed by: INTERNAL MEDICINE

## 2021-10-05 PROCEDURE — 3078F PR MOST RECENT DIASTOLIC BLOOD PRESSURE < 80 MM HG: ICD-10-PCS | Mod: CPTII,S$GLB,, | Performed by: INTERNAL MEDICINE

## 2021-10-05 RX ORDER — HYDROXYZINE HYDROCHLORIDE 25 MG/1
25 TABLET, FILM COATED ORAL 3 TIMES DAILY PRN
Qty: 60 TABLET | Refills: 0 | Status: SHIPPED | OUTPATIENT
Start: 2021-10-05 | End: 2021-11-11

## 2021-10-11 ENCOUNTER — PATIENT MESSAGE (OUTPATIENT)
Dept: INTERNAL MEDICINE | Facility: CLINIC | Age: 76
End: 2021-10-11

## 2021-10-11 RX ORDER — METOPROLOL SUCCINATE 25 MG/1
25 TABLET, EXTENDED RELEASE ORAL DAILY
Qty: 30 TABLET | Refills: 11 | Status: SHIPPED | OUTPATIENT
Start: 2021-10-11 | End: 2022-11-05

## 2021-10-11 RX ORDER — METOPROLOL SUCCINATE 25 MG/1
25 TABLET, EXTENDED RELEASE ORAL DAILY
Qty: 30 TABLET | Refills: 11 | Status: SHIPPED | OUTPATIENT
Start: 2021-10-11 | End: 2021-10-11 | Stop reason: SDUPTHER

## 2021-10-13 ENCOUNTER — PATIENT MESSAGE (OUTPATIENT)
Dept: INTERNAL MEDICINE | Facility: CLINIC | Age: 76
End: 2021-10-13

## 2021-10-14 ENCOUNTER — PATIENT MESSAGE (OUTPATIENT)
Dept: INTERNAL MEDICINE | Facility: CLINIC | Age: 76
End: 2021-10-14
Payer: MEDICARE

## 2021-10-20 ENCOUNTER — PES CALL (OUTPATIENT)
Dept: ADMINISTRATIVE | Facility: CLINIC | Age: 76
End: 2021-10-20

## 2021-10-21 ENCOUNTER — PES CALL (OUTPATIENT)
Dept: ADMINISTRATIVE | Facility: CLINIC | Age: 76
End: 2021-10-21

## 2021-11-08 ENCOUNTER — PATIENT MESSAGE (OUTPATIENT)
Dept: INTERNAL MEDICINE | Facility: CLINIC | Age: 76
End: 2021-11-08
Payer: MEDICARE

## 2021-11-11 ENCOUNTER — PATIENT OUTREACH (OUTPATIENT)
Dept: ADMINISTRATIVE | Facility: OTHER | Age: 76
End: 2021-11-11
Payer: MEDICARE

## 2021-11-12 ENCOUNTER — OFFICE VISIT (OUTPATIENT)
Dept: ENDOCRINOLOGY | Facility: CLINIC | Age: 76
End: 2021-11-12
Payer: MEDICARE

## 2021-11-12 ENCOUNTER — OFFICE VISIT (OUTPATIENT)
Dept: INTERNAL MEDICINE | Facility: CLINIC | Age: 76
End: 2021-11-12
Payer: MEDICARE

## 2021-11-12 VITALS
BODY MASS INDEX: 29.77 KG/M2 | DIASTOLIC BLOOD PRESSURE: 79 MMHG | HEART RATE: 70 BPM | OXYGEN SATURATION: 97 % | HEIGHT: 61 IN | SYSTOLIC BLOOD PRESSURE: 130 MMHG | WEIGHT: 157.69 LBS

## 2021-11-12 VITALS
WEIGHT: 156.75 LBS | BODY MASS INDEX: 29.59 KG/M2 | SYSTOLIC BLOOD PRESSURE: 132 MMHG | HEART RATE: 63 BPM | HEIGHT: 61 IN | DIASTOLIC BLOOD PRESSURE: 60 MMHG

## 2021-11-12 DIAGNOSIS — I70.0 AORTIC ATHEROSCLEROSIS: ICD-10-CM

## 2021-11-12 DIAGNOSIS — F51.01 PRIMARY INSOMNIA: ICD-10-CM

## 2021-11-12 DIAGNOSIS — E78.2 MIXED HYPERLIPIDEMIA: ICD-10-CM

## 2021-11-12 DIAGNOSIS — K21.9 GASTROESOPHAGEAL REFLUX DISEASE WITHOUT ESOPHAGITIS: ICD-10-CM

## 2021-11-12 DIAGNOSIS — J84.9 ILD (INTERSTITIAL LUNG DISEASE): ICD-10-CM

## 2021-11-12 DIAGNOSIS — Z00.00 ENCOUNTER FOR PREVENTIVE HEALTH EXAMINATION: Primary | ICD-10-CM

## 2021-11-12 DIAGNOSIS — E66.3 OVERWEIGHT (BMI 25.0-29.9): ICD-10-CM

## 2021-11-12 DIAGNOSIS — I77.819 AORTIC ECTASIA: ICD-10-CM

## 2021-11-12 DIAGNOSIS — M81.0 AGE-RELATED OSTEOPOROSIS WITHOUT CURRENT PATHOLOGICAL FRACTURE: ICD-10-CM

## 2021-11-12 PROCEDURE — G0439 PR MEDICARE ANNUAL WELLNESS SUBSEQUENT VISIT: ICD-10-PCS | Mod: S$GLB,,, | Performed by: NURSE PRACTITIONER

## 2021-11-12 PROCEDURE — 1101F PR PT FALLS ASSESS DOC 0-1 FALLS W/OUT INJ PAST YR: ICD-10-PCS | Mod: CPTII,S$GLB,, | Performed by: NURSE PRACTITIONER

## 2021-11-12 PROCEDURE — 1170F FXNL STATUS ASSESSED: CPT | Mod: CPTII,S$GLB,, | Performed by: NURSE PRACTITIONER

## 2021-11-12 PROCEDURE — 99214 OFFICE O/P EST MOD 30 MIN: CPT | Mod: S$GLB,,, | Performed by: NURSE PRACTITIONER

## 2021-11-12 PROCEDURE — 1126F AMNT PAIN NOTED NONE PRSNT: CPT | Mod: CPTII,S$GLB,, | Performed by: NURSE PRACTITIONER

## 2021-11-12 PROCEDURE — 1170F PR FUNCTIONAL STATUS ASSESSED: ICD-10-PCS | Mod: CPTII,S$GLB,, | Performed by: NURSE PRACTITIONER

## 2021-11-12 PROCEDURE — 99999 PR PBB SHADOW E&M-EST. PATIENT-LVL IV: ICD-10-PCS | Mod: PBBFAC,,, | Performed by: NURSE PRACTITIONER

## 2021-11-12 PROCEDURE — 1160F RVW MEDS BY RX/DR IN RCRD: CPT | Mod: CPTII,S$GLB,, | Performed by: NURSE PRACTITIONER

## 2021-11-12 PROCEDURE — 1126F PR PAIN SEVERITY QUANTIFIED, NO PAIN PRESENT: ICD-10-PCS | Mod: CPTII,S$GLB,, | Performed by: NURSE PRACTITIONER

## 2021-11-12 PROCEDURE — 1159F MED LIST DOCD IN RCRD: CPT | Mod: CPTII,S$GLB,, | Performed by: NURSE PRACTITIONER

## 2021-11-12 PROCEDURE — 3288F PR FALLS RISK ASSESSMENT DOCUMENTED: ICD-10-PCS | Mod: CPTII,S$GLB,, | Performed by: NURSE PRACTITIONER

## 2021-11-12 PROCEDURE — 99214 PR OFFICE/OUTPT VISIT, EST, LEVL IV, 30-39 MIN: ICD-10-PCS | Mod: S$GLB,,, | Performed by: NURSE PRACTITIONER

## 2021-11-12 PROCEDURE — 1101F PT FALLS ASSESS-DOCD LE1/YR: CPT | Mod: CPTII,S$GLB,, | Performed by: NURSE PRACTITIONER

## 2021-11-12 PROCEDURE — 3078F DIAST BP <80 MM HG: CPT | Mod: CPTII,S$GLB,, | Performed by: NURSE PRACTITIONER

## 2021-11-12 PROCEDURE — 3075F SYST BP GE 130 - 139MM HG: CPT | Mod: CPTII,S$GLB,, | Performed by: NURSE PRACTITIONER

## 2021-11-12 PROCEDURE — 3078F PR MOST RECENT DIASTOLIC BLOOD PRESSURE < 80 MM HG: ICD-10-PCS | Mod: CPTII,S$GLB,, | Performed by: NURSE PRACTITIONER

## 2021-11-12 PROCEDURE — 1160F PR REVIEW ALL MEDS BY PRESCRIBER/CLIN PHARMACIST DOCUMENTED: ICD-10-PCS | Mod: CPTII,S$GLB,, | Performed by: NURSE PRACTITIONER

## 2021-11-12 PROCEDURE — G0439 PPPS, SUBSEQ VISIT: HCPCS | Mod: S$GLB,,, | Performed by: NURSE PRACTITIONER

## 2021-11-12 PROCEDURE — 99999 PR PBB SHADOW E&M-EST. PATIENT-LVL IV: CPT | Mod: PBBFAC,,, | Performed by: NURSE PRACTITIONER

## 2021-11-12 PROCEDURE — 3288F FALL RISK ASSESSMENT DOCD: CPT | Mod: CPTII,S$GLB,, | Performed by: NURSE PRACTITIONER

## 2021-11-12 PROCEDURE — 99999 PR PBB SHADOW E&M-EST. PATIENT-LVL III: ICD-10-PCS | Mod: PBBFAC,,, | Performed by: NURSE PRACTITIONER

## 2021-11-12 PROCEDURE — 3075F PR MOST RECENT SYSTOLIC BLOOD PRESS GE 130-139MM HG: ICD-10-PCS | Mod: CPTII,S$GLB,, | Performed by: NURSE PRACTITIONER

## 2021-11-12 PROCEDURE — 1159F PR MEDICATION LIST DOCUMENTED IN MEDICAL RECORD: ICD-10-PCS | Mod: CPTII,S$GLB,, | Performed by: NURSE PRACTITIONER

## 2021-11-12 PROCEDURE — 99499 RISK ADDL DX/OHS AUDIT: ICD-10-PCS | Mod: S$GLB,,, | Performed by: NURSE PRACTITIONER

## 2021-11-12 PROCEDURE — 99499 UNLISTED E&M SERVICE: CPT | Mod: S$GLB,,, | Performed by: NURSE PRACTITIONER

## 2021-11-12 PROCEDURE — 99999 PR PBB SHADOW E&M-EST. PATIENT-LVL III: CPT | Mod: PBBFAC,,, | Performed by: NURSE PRACTITIONER

## 2021-12-03 RX ORDER — ROSUVASTATIN CALCIUM 10 MG/1
TABLET, COATED ORAL
Qty: 90 TABLET | Refills: 3 | Status: SHIPPED | OUTPATIENT
Start: 2021-12-03 | End: 2022-11-08

## 2021-12-06 ENCOUNTER — INFUSION (OUTPATIENT)
Dept: INFECTIOUS DISEASES | Facility: HOSPITAL | Age: 76
End: 2021-12-06
Attending: INTERNAL MEDICINE
Payer: MEDICARE

## 2021-12-06 VITALS
DIASTOLIC BLOOD PRESSURE: 62 MMHG | BODY MASS INDEX: 29.84 KG/M2 | TEMPERATURE: 98 F | WEIGHT: 158.06 LBS | SYSTOLIC BLOOD PRESSURE: 151 MMHG | RESPIRATION RATE: 18 BRPM | HEIGHT: 61 IN | HEART RATE: 64 BPM | OXYGEN SATURATION: 96 %

## 2021-12-06 DIAGNOSIS — M81.0 AGE-RELATED OSTEOPOROSIS WITHOUT CURRENT PATHOLOGICAL FRACTURE: Primary | ICD-10-CM

## 2021-12-06 PROCEDURE — 96372 THER/PROPH/DIAG INJ SC/IM: CPT

## 2021-12-06 PROCEDURE — 63600175 PHARM REV CODE 636 W HCPCS: Mod: JG | Performed by: NURSE PRACTITIONER

## 2021-12-06 RX ADMIN — DENOSUMAB 60 MG: 60 INJECTION SUBCUTANEOUS at 10:12

## 2021-12-29 ENCOUNTER — PATIENT MESSAGE (OUTPATIENT)
Dept: ADMINISTRATIVE | Facility: OTHER | Age: 76
End: 2021-12-29
Payer: MEDICARE

## 2021-12-29 ENCOUNTER — PATIENT MESSAGE (OUTPATIENT)
Dept: INTERNAL MEDICINE | Facility: CLINIC | Age: 76
End: 2021-12-29
Payer: MEDICARE

## 2022-01-25 ENCOUNTER — TELEPHONE (OUTPATIENT)
Dept: CARDIOTHORACIC SURGERY | Facility: CLINIC | Age: 77
End: 2022-01-25
Payer: MEDICARE

## 2022-01-25 ENCOUNTER — PATIENT MESSAGE (OUTPATIENT)
Dept: CARDIOTHORACIC SURGERY | Facility: CLINIC | Age: 77
End: 2022-01-25
Payer: MEDICARE

## 2022-01-25 NOTE — TELEPHONE ENCOUNTER
Scheduled patient for 6 month f/u with CT prior to appt with Dr. Mcdaniel. Patient verbalized understanding. Appt letter mailed

## 2022-02-24 ENCOUNTER — OFFICE VISIT (OUTPATIENT)
Dept: CARDIOTHORACIC SURGERY | Facility: CLINIC | Age: 77
End: 2022-02-24
Payer: MEDICARE

## 2022-02-24 ENCOUNTER — HOSPITAL ENCOUNTER (OUTPATIENT)
Dept: RADIOLOGY | Facility: HOSPITAL | Age: 77
Discharge: HOME | End: 2022-02-24
Attending: THORACIC SURGERY (CARDIOTHORACIC VASCULAR SURGERY)
Payer: MEDICARE

## 2022-02-24 VITALS
BODY MASS INDEX: 29.13 KG/M2 | WEIGHT: 154.31 LBS | OXYGEN SATURATION: 97 % | HEIGHT: 61 IN | SYSTOLIC BLOOD PRESSURE: 144 MMHG | DIASTOLIC BLOOD PRESSURE: 67 MMHG | TEMPERATURE: 98 F | HEART RATE: 67 BPM

## 2022-02-24 DIAGNOSIS — I77.819 AORTIC ECTASIA: Primary | ICD-10-CM

## 2022-02-24 DIAGNOSIS — I35.8 OTHER NONRHEUMATIC AORTIC VALVE DISORDERS: ICD-10-CM

## 2022-02-24 PROCEDURE — 71250 CT CHEST WITHOUT CONTRAST: ICD-10-PCS | Mod: 26,,, | Performed by: RADIOLOGY

## 2022-02-24 PROCEDURE — 1101F PR PT FALLS ASSESS DOC 0-1 FALLS W/OUT INJ PAST YR: ICD-10-PCS | Mod: CPTII,S$GLB,, | Performed by: THORACIC SURGERY (CARDIOTHORACIC VASCULAR SURGERY)

## 2022-02-24 PROCEDURE — 99215 PR OFFICE/OUTPT VISIT, EST, LEVL V, 40-54 MIN: ICD-10-PCS | Mod: S$GLB,,, | Performed by: THORACIC SURGERY (CARDIOTHORACIC VASCULAR SURGERY)

## 2022-02-24 PROCEDURE — 71250 CT THORAX DX C-: CPT | Mod: 26,,, | Performed by: RADIOLOGY

## 2022-02-24 PROCEDURE — 3288F FALL RISK ASSESSMENT DOCD: CPT | Mod: CPTII,S$GLB,, | Performed by: THORACIC SURGERY (CARDIOTHORACIC VASCULAR SURGERY)

## 2022-02-24 PROCEDURE — 99999 PR PBB SHADOW E&M-EST. PATIENT-LVL III: ICD-10-PCS | Mod: PBBFAC,,, | Performed by: THORACIC SURGERY (CARDIOTHORACIC VASCULAR SURGERY)

## 2022-02-24 PROCEDURE — 3077F PR MOST RECENT SYSTOLIC BLOOD PRESSURE >= 140 MM HG: ICD-10-PCS | Mod: CPTII,S$GLB,, | Performed by: THORACIC SURGERY (CARDIOTHORACIC VASCULAR SURGERY)

## 2022-02-24 PROCEDURE — 1159F PR MEDICATION LIST DOCUMENTED IN MEDICAL RECORD: ICD-10-PCS | Mod: CPTII,S$GLB,, | Performed by: THORACIC SURGERY (CARDIOTHORACIC VASCULAR SURGERY)

## 2022-02-24 PROCEDURE — 3078F PR MOST RECENT DIASTOLIC BLOOD PRESSURE < 80 MM HG: ICD-10-PCS | Mod: CPTII,S$GLB,, | Performed by: THORACIC SURGERY (CARDIOTHORACIC VASCULAR SURGERY)

## 2022-02-24 PROCEDURE — 1126F PR PAIN SEVERITY QUANTIFIED, NO PAIN PRESENT: ICD-10-PCS | Mod: CPTII,S$GLB,, | Performed by: THORACIC SURGERY (CARDIOTHORACIC VASCULAR SURGERY)

## 2022-02-24 PROCEDURE — 3077F SYST BP >= 140 MM HG: CPT | Mod: CPTII,S$GLB,, | Performed by: THORACIC SURGERY (CARDIOTHORACIC VASCULAR SURGERY)

## 2022-02-24 PROCEDURE — 99499 RISK ADDL DX/OHS AUDIT: ICD-10-PCS | Mod: S$GLB,,, | Performed by: THORACIC SURGERY (CARDIOTHORACIC VASCULAR SURGERY)

## 2022-02-24 PROCEDURE — 1101F PT FALLS ASSESS-DOCD LE1/YR: CPT | Mod: CPTII,S$GLB,, | Performed by: THORACIC SURGERY (CARDIOTHORACIC VASCULAR SURGERY)

## 2022-02-24 PROCEDURE — 3078F DIAST BP <80 MM HG: CPT | Mod: CPTII,S$GLB,, | Performed by: THORACIC SURGERY (CARDIOTHORACIC VASCULAR SURGERY)

## 2022-02-24 PROCEDURE — 1126F AMNT PAIN NOTED NONE PRSNT: CPT | Mod: CPTII,S$GLB,, | Performed by: THORACIC SURGERY (CARDIOTHORACIC VASCULAR SURGERY)

## 2022-02-24 PROCEDURE — 99999 PR PBB SHADOW E&M-EST. PATIENT-LVL III: CPT | Mod: PBBFAC,,, | Performed by: THORACIC SURGERY (CARDIOTHORACIC VASCULAR SURGERY)

## 2022-02-24 PROCEDURE — 99215 OFFICE O/P EST HI 40 MIN: CPT | Mod: S$GLB,,, | Performed by: THORACIC SURGERY (CARDIOTHORACIC VASCULAR SURGERY)

## 2022-02-24 PROCEDURE — 3288F PR FALLS RISK ASSESSMENT DOCUMENTED: ICD-10-PCS | Mod: CPTII,S$GLB,, | Performed by: THORACIC SURGERY (CARDIOTHORACIC VASCULAR SURGERY)

## 2022-02-24 PROCEDURE — 99499 UNLISTED E&M SERVICE: CPT | Mod: S$GLB,,, | Performed by: THORACIC SURGERY (CARDIOTHORACIC VASCULAR SURGERY)

## 2022-02-24 PROCEDURE — 1159F MED LIST DOCD IN RCRD: CPT | Mod: CPTII,S$GLB,, | Performed by: THORACIC SURGERY (CARDIOTHORACIC VASCULAR SURGERY)

## 2022-02-24 PROCEDURE — 71250 CT THORAX DX C-: CPT | Mod: TC
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
## 2022-02-24 NOTE — LETTER
February 24, 2022        Nahomi Nolasco MD  [address]  [facility] [address]             [facility] 2nd Fl  [address]  [facility] [address]  Phone: [phone]   Patient: Ross Rodriguez   MR Number: [id number]   YOB: 1945   Date of Visit: 2/24/2022       Dear Dr. Nolasco:    Thank you for referring Ross Rodriguez to me for evaluation. Below are the relevant portions of my assessment and plan of care.            If you have questions, please do not hesitate to call me. I look forward to following Ross along with you.    Sincerely,      Armando Mcdaniel MD           CC  No Recipients

## 2022-03-11 ENCOUNTER — PATIENT MESSAGE (OUTPATIENT)
Dept: INTERNAL MEDICINE | Facility: CLINIC | Age: 77
End: 2022-03-11
Payer: MEDICARE

## 2022-04-26 ENCOUNTER — OFFICE VISIT (OUTPATIENT)
Dept: OPTOMETRY | Facility: CLINIC | Age: 77
End: 2022-04-26
Payer: MEDICARE

## 2022-04-26 DIAGNOSIS — H04.123 BILATERAL DRY EYES: ICD-10-CM

## 2022-04-26 DIAGNOSIS — H52.4 BILATERAL PRESBYOPIA: ICD-10-CM

## 2022-04-26 DIAGNOSIS — H25.13 NUCLEAR SCLEROSIS, BILATERAL: Primary | ICD-10-CM

## 2022-04-26 PROCEDURE — 1160F PR REVIEW ALL MEDS BY PRESCRIBER/CLIN PHARMACIST DOCUMENTED: ICD-10-PCS | Mod: CPTII,S$GLB,, | Performed by: OPTOMETRIST

## 2022-04-26 PROCEDURE — 92014 COMPRE OPH EXAM EST PT 1/>: CPT | Mod: S$GLB,,, | Performed by: OPTOMETRIST

## 2022-04-26 PROCEDURE — 99499 RISK ADDL DX/OHS AUDIT: ICD-10-PCS | Mod: S$GLB,,, | Performed by: OPTOMETRIST

## 2022-04-26 PROCEDURE — 92014 PR EYE EXAM, EST PATIENT,COMPREHESV: ICD-10-PCS | Mod: S$GLB,,, | Performed by: OPTOMETRIST

## 2022-04-26 PROCEDURE — 1126F AMNT PAIN NOTED NONE PRSNT: CPT | Mod: CPTII,S$GLB,, | Performed by: OPTOMETRIST

## 2022-04-26 PROCEDURE — 1159F PR MEDICATION LIST DOCUMENTED IN MEDICAL RECORD: ICD-10-PCS | Mod: CPTII,S$GLB,, | Performed by: OPTOMETRIST

## 2022-04-26 PROCEDURE — 99499 UNLISTED E&M SERVICE: CPT | Mod: S$GLB,,, | Performed by: OPTOMETRIST

## 2022-04-26 PROCEDURE — 99999 PR PBB SHADOW E&M-EST. PATIENT-LVL III: CPT | Mod: PBBFAC,,, | Performed by: OPTOMETRIST

## 2022-04-26 PROCEDURE — 1126F PR PAIN SEVERITY QUANTIFIED, NO PAIN PRESENT: ICD-10-PCS | Mod: CPTII,S$GLB,, | Performed by: OPTOMETRIST

## 2022-04-26 PROCEDURE — 1160F RVW MEDS BY RX/DR IN RCRD: CPT | Mod: CPTII,S$GLB,, | Performed by: OPTOMETRIST

## 2022-04-26 PROCEDURE — 99999 PR PBB SHADOW E&M-EST. PATIENT-LVL III: ICD-10-PCS | Mod: PBBFAC,,, | Performed by: OPTOMETRIST

## 2022-04-26 PROCEDURE — 1101F PR PT FALLS ASSESS DOC 0-1 FALLS W/OUT INJ PAST YR: ICD-10-PCS | Mod: CPTII,S$GLB,, | Performed by: OPTOMETRIST

## 2022-04-26 PROCEDURE — 1101F PT FALLS ASSESS-DOCD LE1/YR: CPT | Mod: CPTII,S$GLB,, | Performed by: OPTOMETRIST

## 2022-04-26 PROCEDURE — 3288F FALL RISK ASSESSMENT DOCD: CPT | Mod: CPTII,S$GLB,, | Performed by: OPTOMETRIST

## 2022-04-26 PROCEDURE — 3288F PR FALLS RISK ASSESSMENT DOCUMENTED: ICD-10-PCS | Mod: CPTII,S$GLB,, | Performed by: OPTOMETRIST

## 2022-04-26 PROCEDURE — 1159F MED LIST DOCD IN RCRD: CPT | Mod: CPTII,S$GLB,, | Performed by: OPTOMETRIST

## 2022-04-26 NOTE — PROGRESS NOTES
HPI     Dry Eye      Additional comments: Patient Ross is a 76 year old female.              Comments     Pt here for annual eye exam. Pt states that her VA OU is stable since   HELGA. Pt states dry, tired, burning eyes from computer use. Pt uses Systane   qday and they help. Patient denies diplopia, headaches, flashes/floaters,   and pain.    DLS: 02/23/2021 with Dr. Lozano    Gtts: (+)Systane qday OU    POHx: (+)YENIFER OU (+)cataracts OU (+)drusen OU    FOHx: (+)cataracts - mother, father          Last edited by Yaima Redman on 4/26/2022  1:08 PM. (History)            Assessment /Plan     For exam results, see Encounter Report.    Nuclear sclerosis, bilateral    Bilateral dry eyes    Bilateral presbyopia      1. Educated pt on presence of cataracts and effects on vision. No surgery at this time. Recheck in one year.  2. Recommend artificial tears. 1 drop 2x per day. Chronicity of disease and treatment discussed.  3. Spectacle Rx given, discussed different options for glasses. RTC 1 year routine eye exam.

## 2022-05-03 ENCOUNTER — PES CALL (OUTPATIENT)
Dept: ADMINISTRATIVE | Facility: CLINIC | Age: 77
End: 2022-05-03
Payer: MEDICARE

## 2022-05-27 ENCOUNTER — PES CALL (OUTPATIENT)
Dept: ADMINISTRATIVE | Facility: CLINIC | Age: 77
End: 2022-05-27
Payer: MEDICARE

## 2022-06-08 ENCOUNTER — TELEPHONE (OUTPATIENT)
Dept: ADMINISTRATIVE | Facility: CLINIC | Age: 77
End: 2022-06-08
Payer: MEDICARE

## 2022-06-09 ENCOUNTER — OFFICE VISIT (OUTPATIENT)
Dept: INTERNAL MEDICINE | Facility: CLINIC | Age: 77
End: 2022-06-09
Payer: MEDICARE

## 2022-06-09 ENCOUNTER — INFUSION (OUTPATIENT)
Dept: INFECTIOUS DISEASES | Facility: HOSPITAL | Age: 77
End: 2022-06-09
Attending: INTERNAL MEDICINE
Payer: MEDICARE

## 2022-06-09 ENCOUNTER — TELEPHONE (OUTPATIENT)
Dept: INTERNAL MEDICINE | Facility: CLINIC | Age: 77
End: 2022-06-09

## 2022-06-09 VITALS
HEIGHT: 61 IN | BODY MASS INDEX: 30.09 KG/M2 | HEART RATE: 65 BPM | DIASTOLIC BLOOD PRESSURE: 68 MMHG | SYSTOLIC BLOOD PRESSURE: 135 MMHG | WEIGHT: 159.38 LBS | RESPIRATION RATE: 16 BRPM

## 2022-06-09 VITALS
HEART RATE: 60 BPM | WEIGHT: 158.75 LBS | OXYGEN SATURATION: 97 % | RESPIRATION RATE: 16 BRPM | DIASTOLIC BLOOD PRESSURE: 74 MMHG | SYSTOLIC BLOOD PRESSURE: 160 MMHG | BODY MASS INDEX: 29.99 KG/M2 | TEMPERATURE: 97 F

## 2022-06-09 DIAGNOSIS — E78.2 MIXED HYPERLIPIDEMIA: ICD-10-CM

## 2022-06-09 DIAGNOSIS — I70.0 AORTIC ATHEROSCLEROSIS: ICD-10-CM

## 2022-06-09 DIAGNOSIS — E66.9 OBESITY (BMI 30-39.9): ICD-10-CM

## 2022-06-09 DIAGNOSIS — J84.9 ILD (INTERSTITIAL LUNG DISEASE): ICD-10-CM

## 2022-06-09 DIAGNOSIS — J84.10 CALCIFIED GRANULOMA OF LUNG: ICD-10-CM

## 2022-06-09 DIAGNOSIS — Z00.00 ENCOUNTER FOR PREVENTIVE HEALTH EXAMINATION: Primary | ICD-10-CM

## 2022-06-09 DIAGNOSIS — M81.0 AGE-RELATED OSTEOPOROSIS WITHOUT CURRENT PATHOLOGICAL FRACTURE: ICD-10-CM

## 2022-06-09 DIAGNOSIS — F51.01 PRIMARY INSOMNIA: ICD-10-CM

## 2022-06-09 DIAGNOSIS — M81.0 AGE-RELATED OSTEOPOROSIS WITHOUT CURRENT PATHOLOGICAL FRACTURE: Primary | ICD-10-CM

## 2022-06-09 DIAGNOSIS — I77.819 AORTIC ECTASIA: ICD-10-CM

## 2022-06-09 DIAGNOSIS — K21.9 GASTROESOPHAGEAL REFLUX DISEASE WITHOUT ESOPHAGITIS: ICD-10-CM

## 2022-06-09 PROCEDURE — 1160F RVW MEDS BY RX/DR IN RCRD: CPT | Mod: CPTII,S$GLB,, | Performed by: NURSE PRACTITIONER

## 2022-06-09 PROCEDURE — 1160F PR REVIEW ALL MEDS BY PRESCRIBER/CLIN PHARMACIST DOCUMENTED: ICD-10-PCS | Mod: CPTII,S$GLB,, | Performed by: NURSE PRACTITIONER

## 2022-06-09 PROCEDURE — 96372 THER/PROPH/DIAG INJ SC/IM: CPT

## 2022-06-09 PROCEDURE — 3288F FALL RISK ASSESSMENT DOCD: CPT | Mod: CPTII,S$GLB,, | Performed by: NURSE PRACTITIONER

## 2022-06-09 PROCEDURE — 63600175 PHARM REV CODE 636 W HCPCS: Mod: JG | Performed by: NURSE PRACTITIONER

## 2022-06-09 PROCEDURE — 99499 UNLISTED E&M SERVICE: CPT | Mod: S$GLB,,, | Performed by: NURSE PRACTITIONER

## 2022-06-09 PROCEDURE — 1101F PT FALLS ASSESS-DOCD LE1/YR: CPT | Mod: CPTII,S$GLB,, | Performed by: NURSE PRACTITIONER

## 2022-06-09 PROCEDURE — 1159F MED LIST DOCD IN RCRD: CPT | Mod: CPTII,S$GLB,, | Performed by: NURSE PRACTITIONER

## 2022-06-09 PROCEDURE — G0439 PR MEDICARE ANNUAL WELLNESS SUBSEQUENT VISIT: ICD-10-PCS | Mod: S$GLB,,, | Performed by: NURSE PRACTITIONER

## 2022-06-09 PROCEDURE — 99499 RISK ADDL DX/OHS AUDIT: ICD-10-PCS | Mod: S$GLB,,, | Performed by: NURSE PRACTITIONER

## 2022-06-09 PROCEDURE — 1126F PR PAIN SEVERITY QUANTIFIED, NO PAIN PRESENT: ICD-10-PCS | Mod: CPTII,S$GLB,, | Performed by: NURSE PRACTITIONER

## 2022-06-09 PROCEDURE — 99999 PR PBB SHADOW E&M-EST. PATIENT-LVL IV: CPT | Mod: PBBFAC,,, | Performed by: NURSE PRACTITIONER

## 2022-06-09 PROCEDURE — 1170F PR FUNCTIONAL STATUS ASSESSED: ICD-10-PCS | Mod: CPTII,S$GLB,, | Performed by: NURSE PRACTITIONER

## 2022-06-09 PROCEDURE — 1159F PR MEDICATION LIST DOCUMENTED IN MEDICAL RECORD: ICD-10-PCS | Mod: CPTII,S$GLB,, | Performed by: NURSE PRACTITIONER

## 2022-06-09 PROCEDURE — 1126F AMNT PAIN NOTED NONE PRSNT: CPT | Mod: CPTII,S$GLB,, | Performed by: NURSE PRACTITIONER

## 2022-06-09 PROCEDURE — 3288F PR FALLS RISK ASSESSMENT DOCUMENTED: ICD-10-PCS | Mod: CPTII,S$GLB,, | Performed by: NURSE PRACTITIONER

## 2022-06-09 PROCEDURE — 1101F PR PT FALLS ASSESS DOC 0-1 FALLS W/OUT INJ PAST YR: ICD-10-PCS | Mod: CPTII,S$GLB,, | Performed by: NURSE PRACTITIONER

## 2022-06-09 PROCEDURE — 99999 PR PBB SHADOW E&M-EST. PATIENT-LVL IV: ICD-10-PCS | Mod: PBBFAC,,, | Performed by: NURSE PRACTITIONER

## 2022-06-09 PROCEDURE — G0439 PPPS, SUBSEQ VISIT: HCPCS | Mod: S$GLB,,, | Performed by: NURSE PRACTITIONER

## 2022-06-09 PROCEDURE — 1170F FXNL STATUS ASSESSED: CPT | Mod: CPTII,S$GLB,, | Performed by: NURSE PRACTITIONER

## 2022-06-09 RX ADMIN — DENOSUMAB 60 MG: 60 INJECTION SUBCUTANEOUS at 11:06

## 2022-06-09 NOTE — PATIENT INSTRUCTIONS
1. You will receive a letter in the mail when it is time to schedule your next appointment with Dr. Nahomi Nolasco MD.    2. Eligible for additional covid booster if interested.    Counseling and Referral of Other Preventative  (Italic type indicates deductible and co-insurance are waived)    Patient Name: Ross Rodriguez  Today's Date: 6/9/2022    Health Maintenance       Date Due Completion Date    COVID-19 Vaccine (4 - Booster for Pfizer series) 01/05/2022 10/5/2021    DEXA Scan 10/01/2023 10/1/2021    Override on 1/24/2012: Done    TETANUS VACCINE 06/21/2026 6/21/2016    Lipid Panel 09/28/2026 9/28/2021        No orders of the defined types were placed in this encounter.    The following information is provided to all patients.  This information is to help you find resources for any of the problems found today that may be affecting your health:                Living healthy guide: www.[facility].[place].gov      Understanding Diabetes: www.diabetes.org      Eating healthy: www.cdc.gov/healthyweight      CDC home safety checklist: www.cdc.gov/steadi/patient.html      Agency on Aging: www.goea.[place].[facility]      Alcoholics anonymous (AA): www.aa.org      Physical Activity: www.lonnie.nih.gov/ww8chsc      Tobacco use: www.quitwithusla.org

## 2022-06-09 NOTE — PROGRESS NOTES
"Ross Rodriguez presented for a  Medicare AWV and comprehensive Health Risk Assessment today. The following components were reviewed and updated:    · Medical history  · Family History  · Social history  · Allergies and Current Medications  · Health Risk Assessment  · Health Maintenance  · Care Team         ** See Completed Assessments for Annual Wellness Visit within the encounter summary.**         The following assessments were completed:  · Living Situation  · CAGE  · Depression Screening  · Timed Get Up and Go  · Whisper Test  · Cognitive Function Screening    · Nutrition Screening  · ADL Screening  · PAQ Screening        Vitals:    06/09/22 1018   BP: 135/68   BP Location: Right arm   Patient Position: Sitting   BP Method: Large (Manual)   Pulse: 65   Resp: 16   Weight: 72.3 kg (159 lb 6.3 oz)   Height: 5' 1" (1.549 m)     Body mass index is 30.12 kg/m².     Physical Exam  Vitals reviewed.   Constitutional:       Appearance: Normal appearance.   HENT:      Head: Normocephalic.   Cardiovascular:      Rate and Rhythm: Normal rate.   Pulmonary:      Effort: Pulmonary effort is normal.   Abdominal:      General: Bowel sounds are normal.   Musculoskeletal:         General: Normal range of motion.      Cervical back: Normal range of motion.      Right lower leg: No edema.      Left lower leg: No edema.   Skin:     General: Skin is warm and dry.      Capillary Refill: Capillary refill takes less than 2 seconds.   Neurological:      Mental Status: She is alert and oriented to person, place, and time.   Psychiatric:         Behavior: Behavior normal.         Thought Content: Thought content normal.         Judgment: Judgment normal.               Diagnoses and health risks identified today and associated recommendations/orders:    1. Encounter for preventive health examination  Assessments completed.  HM recommendations reviewed. Eligible for additional covid booster if desired.  F/u with PCP as instructed.    2. ILD " (interstitial lung disease): see CT 2019, also 2020; stable 8/21  Chronic, stable on current regimen. Followed by PCP.    3. Calcified granuloma of lung  Chronic, stable on current regimen. Followed by PCP. Noted as stable on recent CT chest dated 2/24/2022.    4. Aortic atherosclerosis: see CT scan 7/18; stable 8/20; stable 8/21  Chronic, stable on current regimen. Followed by CT surgery / PCP. On statin.    5. Aortic ectasia: ascending aorta 4.7 cm 2016; stable 2020 follows in CTS; enlarged to 4.9 8/21  Chronic, stable on current regimen. Followed by CT surgery.    6. Mixed hyperlipidemia  Chronic, stable on current regimen. Followed by PCP. On statin.    7. Obesity (BMI 30-39.9)  Chronic, stable on current regimen. Followed by PCP.    8. Age-related osteoporosis; see DEXA 2019  Chronic, stable on current regimen. Followed by endocrinology. On prolia injections.    9. Gastroesophageal reflux disease without esophagitis  Chronic, stable on current regimen. Followed by PCP.    10. Primary insomnia  Chronic, stable on current regimen. Followed by PCP.      Provided Joyclyn with a 5-10 year written screening schedule and personal prevention plan. Recommendations were developed using the USPSTF age appropriate recommendations. Education, counseling, and referrals were provided as needed. After Visit Summary printed and given to patient which includes a list of additional screenings\tests needed.    Follow up in about 1 year (around 6/9/2023) for Medicare AWV and with PCP as instructed.       Milagro Roy NP     I offered to discuss advanced care planning, including how to pick a person who would make decisions for you if you were unable to make them for yourself, called a health care power of , and what kind of decisions you might make such as use of life sustaining treatments such as ventilators and tube feeding when faced with a life limiting illness recorded on a living will that they will need to know.  (How you want to be cared for as you near the end of your natural life)     X  Patient has advanced directives written and agrees to provide copies to the institution.

## 2022-06-09 NOTE — TELEPHONE ENCOUNTER
----- Message from Milagro Roy NP sent at 6/9/2022 10:31 AM CDT -----  Patient seen for AWV today. Requesting refill on Lotrisone cream, having vaginitis symptoms not relieved by usual care.    Thanks,  Milagro Roy NP   Internal Medicine

## 2022-06-09 NOTE — PROGRESS NOTES
Patient received Prolia 60 mg injection sub Q in the left arm. Denies dental surgery < 3 months. Patient tolerated injection well and left in NAD.    Patient is taking Calcium and Vitamin D           Patient

## 2022-06-10 ENCOUNTER — PATIENT MESSAGE (OUTPATIENT)
Dept: INTERNAL MEDICINE | Facility: CLINIC | Age: 77
End: 2022-06-10
Payer: MEDICARE

## 2022-06-13 ENCOUNTER — PATIENT MESSAGE (OUTPATIENT)
Dept: INTERNAL MEDICINE | Facility: CLINIC | Age: 77
End: 2022-06-13
Payer: MEDICARE

## 2022-06-13 DIAGNOSIS — N76.0 ACUTE VAGINITIS: ICD-10-CM

## 2022-06-13 RX ORDER — CLOTRIMAZOLE AND BETAMETHASONE DIPROPIONATE 10; .64 MG/G; MG/G
CREAM TOPICAL
Qty: 45 G | Refills: 1 | Status: SHIPPED | OUTPATIENT
Start: 2022-06-13 | End: 2023-06-13

## 2022-06-13 NOTE — TELEPHONE ENCOUNTER
----- Message from Koaml Toney sent at 6/13/2022  7:50 AM CDT -----  Contact: Pt Home [phone]  Patient is returning a phone call.  Who left a message for the patient:   Does patient know what this is regarding:    Would you like a call back, or a response through your [facility] portal?:   Portal   Comments:  Patient received a call back from Nahomi Nolasco on today.  Patient would like for you to send her script to   [facility]/pharmacy #[id number] - [address] AT CORNER OF [address] [address]  Phone: [phone] Fax: [phone]

## 2022-08-22 ENCOUNTER — PATIENT MESSAGE (OUTPATIENT)
Dept: CARDIOTHORACIC SURGERY | Facility: CLINIC | Age: 77
End: 2022-08-22
Payer: MEDICARE

## 2022-08-22 DIAGNOSIS — I77.819 AORTIC ECTASIA: Primary | ICD-10-CM

## 2022-09-08 ENCOUNTER — OFFICE VISIT (OUTPATIENT)
Dept: CARDIOTHORACIC SURGERY | Facility: CLINIC | Age: 77
End: 2022-09-08
Payer: MEDICARE

## 2022-09-08 ENCOUNTER — HOSPITAL ENCOUNTER (OUTPATIENT)
Dept: RADIOLOGY | Facility: HOSPITAL | Age: 77
Discharge: HOME | End: 2022-09-08
Attending: THORACIC SURGERY (CARDIOTHORACIC VASCULAR SURGERY)
Payer: MEDICARE

## 2022-09-08 VITALS
HEART RATE: 74 BPM | BODY MASS INDEX: 29.63 KG/M2 | HEIGHT: 61 IN | SYSTOLIC BLOOD PRESSURE: 133 MMHG | DIASTOLIC BLOOD PRESSURE: 63 MMHG | WEIGHT: 156.94 LBS | OXYGEN SATURATION: 96 % | RESPIRATION RATE: 18 BRPM

## 2022-09-08 DIAGNOSIS — I77.819 AORTIC ECTASIA: ICD-10-CM

## 2022-09-08 DIAGNOSIS — I77.819 AORTIC ECTASIA: Primary | ICD-10-CM

## 2022-09-08 PROCEDURE — 3075F SYST BP GE 130 - 139MM HG: CPT | Mod: CPTII,S$GLB,, | Performed by: THORACIC SURGERY (CARDIOTHORACIC VASCULAR SURGERY)

## 2022-09-08 PROCEDURE — 3288F FALL RISK ASSESSMENT DOCD: CPT | Mod: CPTII,S$GLB,, | Performed by: THORACIC SURGERY (CARDIOTHORACIC VASCULAR SURGERY)

## 2022-09-08 PROCEDURE — 1101F PR PT FALLS ASSESS DOC 0-1 FALLS W/OUT INJ PAST YR: ICD-10-PCS | Mod: CPTII,S$GLB,, | Performed by: THORACIC SURGERY (CARDIOTHORACIC VASCULAR SURGERY)

## 2022-09-08 PROCEDURE — 71250 CT THORAX DX C-: CPT | Mod: TC

## 2022-09-08 PROCEDURE — 1126F AMNT PAIN NOTED NONE PRSNT: CPT | Mod: CPTII,S$GLB,, | Performed by: THORACIC SURGERY (CARDIOTHORACIC VASCULAR SURGERY)

## 2022-09-08 PROCEDURE — 1159F PR MEDICATION LIST DOCUMENTED IN MEDICAL RECORD: ICD-10-PCS | Mod: CPTII,S$GLB,, | Performed by: THORACIC SURGERY (CARDIOTHORACIC VASCULAR SURGERY)

## 2022-09-08 PROCEDURE — 99999 PR PBB SHADOW E&M-EST. PATIENT-LVL III: CPT | Mod: PBBFAC,,, | Performed by: THORACIC SURGERY (CARDIOTHORACIC VASCULAR SURGERY)

## 2022-09-08 PROCEDURE — 3288F PR FALLS RISK ASSESSMENT DOCUMENTED: ICD-10-PCS | Mod: CPTII,S$GLB,, | Performed by: THORACIC SURGERY (CARDIOTHORACIC VASCULAR SURGERY)

## 2022-09-08 PROCEDURE — 71250 CT CHEST WITHOUT CONTRAST: ICD-10-PCS | Mod: 26,,, | Performed by: RADIOLOGY

## 2022-09-08 PROCEDURE — 99214 PR OFFICE/OUTPT VISIT, EST, LEVL IV, 30-39 MIN: ICD-10-PCS | Mod: S$GLB,,, | Performed by: THORACIC SURGERY (CARDIOTHORACIC VASCULAR SURGERY)

## 2022-09-08 PROCEDURE — 99999 PR PBB SHADOW E&M-EST. PATIENT-LVL III: ICD-10-PCS | Mod: PBBFAC,,, | Performed by: THORACIC SURGERY (CARDIOTHORACIC VASCULAR SURGERY)

## 2022-09-08 PROCEDURE — 71250 CT THORAX DX C-: CPT | Mod: 26,,, | Performed by: RADIOLOGY

## 2022-09-08 PROCEDURE — 3075F PR MOST RECENT SYSTOLIC BLOOD PRESS GE 130-139MM HG: ICD-10-PCS | Mod: CPTII,S$GLB,, | Performed by: THORACIC SURGERY (CARDIOTHORACIC VASCULAR SURGERY)

## 2022-09-08 PROCEDURE — 1101F PT FALLS ASSESS-DOCD LE1/YR: CPT | Mod: CPTII,S$GLB,, | Performed by: THORACIC SURGERY (CARDIOTHORACIC VASCULAR SURGERY)

## 2022-09-08 PROCEDURE — 3078F PR MOST RECENT DIASTOLIC BLOOD PRESSURE < 80 MM HG: ICD-10-PCS | Mod: CPTII,S$GLB,, | Performed by: THORACIC SURGERY (CARDIOTHORACIC VASCULAR SURGERY)

## 2022-09-08 PROCEDURE — 3078F DIAST BP <80 MM HG: CPT | Mod: CPTII,S$GLB,, | Performed by: THORACIC SURGERY (CARDIOTHORACIC VASCULAR SURGERY)

## 2022-09-08 PROCEDURE — 1159F MED LIST DOCD IN RCRD: CPT | Mod: CPTII,S$GLB,, | Performed by: THORACIC SURGERY (CARDIOTHORACIC VASCULAR SURGERY)

## 2022-09-08 PROCEDURE — 99214 OFFICE O/P EST MOD 30 MIN: CPT | Mod: S$GLB,,, | Performed by: THORACIC SURGERY (CARDIOTHORACIC VASCULAR SURGERY)

## 2022-09-08 PROCEDURE — 1126F PR PAIN SEVERITY QUANTIFIED, NO PAIN PRESENT: ICD-10-PCS | Mod: CPTII,S$GLB,, | Performed by: THORACIC SURGERY (CARDIOTHORACIC VASCULAR SURGERY)

## 2022-09-08 NOTE — PROGRESS NOTES
Subjective:      Patient ID: Ross Rodriguez is a 77 y.o. female.    Chief Complaint: No chief complaint on file.      HPI:  Ross Rodriguez is a 77 y.o. female who presents for follow up TAA. PMH of TAA (2009) which was not followed up per patient, HLD and GERD. Pt had CTA of chest from PCP, Dr. Nolasco and TAA was found to be 4.7cm in 2018. Blood pressure is well controlled. Reports always is elevated at Dr visits. Last seen in February 2022, at which time TAA was ~4.9cm, stable from prior. Pt has no complaints, does not require HTN medications, and is a nonsmoker. No change in symptoms. Still able to complete ADLs without issue. No chest pain. Some intermittent swelling in feet.     Family and social history reviewed    Review of patient's allergies indicates:   Allergen Reactions    Fosamax [alendronate] Nausea Only    Nsaids (non-steroidal anti-inflammatory drug) Hives    Sulfa (sulfonamide antibiotics) Hives     Past Medical History:   Diagnosis Date    Allergy     Anxiety     Aortic atherosclerosis: see CT scan 7/18 8/21/2018    Arthritis     Cataract     Depression     Diverticulosis 7/21/2015    GERD (gastroesophageal reflux disease)     Hyperlipidemia     Joint pain     Liver cyst: see CT 2009 6/21/2016    Lower back pain 7/29/2019    Lung cyst: R side see CT 2009 6/21/2016    Nuclear sclerosis - Both Eyes 2/14/2014    Osteoporosis     Primary insomnia 6/21/2016    Primary insomnia 6/21/2016     Past Surgical History:   Procedure Laterality Date    CHOLECYSTECTOMY      DILATION AND CURETTAGE OF UTERUS      WISDOM TOOTH EXTRACTION       Family History       Problem Relation (Age of Onset)    COPD Father    Cancer Father, Paternal Grandmother    Diabetes Father    Hearing loss Father    Heart disease Mother, Father    Cairo's disease Paternal Grandfather    Kidney disease Father    No Known Problems Son    Skin cancer Paternal Aunt    Stroke Mother    Thrombosis Maternal Grandfather          Social History      Socioeconomic History    Marital status:    Occupational History    Occupation: retired    Tobacco Use    Smoking status: Never    Smokeless tobacco: Never   Substance and Sexual Activity    Alcohol use: Yes     Comment: occasional, 1 drink every 3 months    Drug use: No    Sexual activity: Not Currently     Birth control/protection: Post-menopausal     Social Determinants of Health     Financial Resource Strain: Low Risk     Difficulty of Paying Living Expenses: Not hard at all   Food Insecurity: No Food Insecurity    Worried About Running Out of Food in the Last Year: Never true    Ran Out of Food in the Last Year: Never true   Transportation Needs: No Transportation Needs    Lack of Transportation (Medical): No    Lack of Transportation (Non-Medical): No   Physical Activity: Inactive    Days of Exercise per Week: 0 days    Minutes of Exercise per Session: 0 min   Stress: Stress Concern Present    Feeling of Stress : Very much   Social Connections: Moderately Integrated    Frequency of Communication with Friends and Family: More than three times a week    Frequency of Social Gatherings with Friends and Family: More than three times a week    Attends [employer] Services: More than 4 times per year    Active Member of Clubs or Organizations: Yes    Attends Club or Organization Meetings: More than 4 times per year    Marital Status:    Housing Stability: Low Risk     Unable to Pay for Housing in the Last Year: No    Number of Places Lived in the Last Year: 1    Unstable Housing in the Last Year: No     Current Outpatient Medications   Medication Instructions    acetaminophen (TYLENOL) 500 mg Cap No dose, route, or frequency recorded.    calcium citrate-vitamin D2 1,500-200 mg-unit Tab No dose, route, or frequency recorded.    clotrimazole-betamethasone 1-0.05% (LOTRISONE) cream Apply to affected area 2 times daily    denosumab (PROLIA) 60 mg, Subcutaneous, Every 6 months    fish oil-omega-3 fatty  acids 300-1,000 mg capsule 2 g, Oral, Daily, Takes 2 times daily     FLAXSEED ORAL 1 application, Oral, Daily, Add seeds to oatmeal     LORazepam (ATIVAN) 0.5 mg, Oral, As needed (PRN)    metoprolol succinate (TOPROL-XL) 25 mg, Oral, Daily    multivitamin (THERAGRAN) per tablet 1 tablet, Oral, Daily    rosuvastatin (CRESTOR) 10 MG tablet TAKE 1 TABLET BY MOUTH ONCE DAILY         Review of Systems   Constitutional:  Negative for activity change and fatigue.   HENT:  Negative for nosebleeds.    Eyes:  Negative for visual disturbance.   Respiratory:  Negative for shortness of breath.    Cardiovascular:  Positive for leg swelling. Negative for chest pain.   Gastrointestinal:  Negative for nausea.   Musculoskeletal:  Negative for gait problem.   Skin:  Negative for color change.   Neurological:  Negative for dizziness.   Hematological:  Does not bruise/bleed easily.   Psychiatric/Behavioral:  Negative for sleep disturbance.    Objective:   Physical Exam  Constitutional:       General: She is not in acute distress.  HENT:      Head: Normocephalic and atraumatic.   Eyes:      Pupils: Pupils are equal, round, and reactive to light.   Cardiovascular:      Rate and Rhythm: Normal rate.   Pulmonary:      Effort: Pulmonary effort is normal. No respiratory distress.   Abdominal:      General: There is no distension.   Musculoskeletal:         General: Normal range of motion.      Cervical back: Normal range of motion.   Skin:     Coloration: Skin is not pale.   Neurological:      General: No focal deficit present.      Mental Status: She is alert.   Psychiatric:         Mood and Affect: Mood normal.         Behavior: Behavior normal.       Diagnostic Results: reviewed   CT 9/8/22 reviewed     CT 2/2022  Fusiform dilatation of ascending aorta measuring up to 4.7 cm, similar to prior measuring 4.7 cm.    CT 8/6/2020  1.  Fusiform dilatation of the ascending aorta measuring on the order of 4.4-4.5 cm, no larger than on the recent  study of 08/13/2019.  No saccular aneurysm.  Aorta has normal caliber and contour distally.  Modest calcific atherosclerosis in the isthmus and descending aorta.  No calcification in the ascending aortic wall or arch.     2.  Mitral annular calcification with or without circumflex calcific atherosclerosis.     3.  No pulmonary edema.  Mild peripheral pulmonary parenchymal reticulation is noted at the lateral aspects of the middle lobe, lingula and lower lobes, no worse than on 08/13/2019.      TTE 11/6/2019  Normal left ventricular systolic function. The estimated ejection fraction is 55%  Grade I (mild) left ventricular diastolic dysfunction consistent with impaired relaxation.  Concentric left ventricular hypertrophy.  No wall motion abnormalities.  Normal right ventricular systolic function.  Mild left atrial enlargement.  Mild mitral regurgitation.  Mild tricuspid regurgitation.  Moderate aortic regurgitation. - Central  The estimated PA systolic pressure is 31 mm Hg  Intermediate central venous pressure (8 mm Hg).  There is an ascending aortic aneursym measuring between 4.3-4.6 cm. Suggest CT for better evalulation  Assessment:   TAA ~4.9cm   Plan:     CTS Attending Note:     I have personally taken the history and examined this patient and agree with the NISH's note as stated above.  Very pleasant 77-year-old woman whom I have been following for an ascending aortic aneurysm.  She remains below the threshold at which we would recommend surgical correction.  However, she is close.  I will plan to see her back in 6 months time with a repeat CT.

## 2022-09-09 ENCOUNTER — TELEPHONE (OUTPATIENT)
Dept: INTERNAL MEDICINE | Facility: CLINIC | Age: 77
End: 2022-09-09
Payer: MEDICARE

## 2022-09-09 DIAGNOSIS — R73.01 IFG (IMPAIRED FASTING GLUCOSE): ICD-10-CM

## 2022-09-09 DIAGNOSIS — R53.83 FATIGUE, UNSPECIFIED TYPE: ICD-10-CM

## 2022-09-09 DIAGNOSIS — E78.2 MIXED HYPERLIPIDEMIA: Primary | ICD-10-CM

## 2022-09-09 DIAGNOSIS — E55.9 VITAMIN D DEFICIENCY DISEASE: ICD-10-CM

## 2022-09-09 NOTE — TELEPHONE ENCOUNTER
ASSISTING  Shaaron Lanes , RN NAVIGATOR. I CALLED TO CHECK IN WITH PATIENT TO SEE HOW SHE IS DOING. I SPOKE WITH PATIENT AND SHE STATES THAT SHE IS DOING WELL. [address] [place], [place] [address] IS JUST MONITORING HER AND NOT SURE IF SHE IS DUE FOR ANY TESTING TO SEE IF THERE ARE ANY CHANGES IN HER BREAST. SHE STATES SHE HAS DR. Jen Sánchez OFFICE NUMBER AND WILL CALL THEM TO CHECK. I REMINDED HER THAT SHE CAN CALL [facility] AT ANY TIME.   SHE THANKED ME FOR CALLING AND WE HUNG UP. Apt scheduled and mailed to pt

## 2022-09-14 ENCOUNTER — TELEPHONE (OUTPATIENT)
Dept: INTERNAL MEDICINE | Facility: CLINIC | Age: 77
End: 2022-09-14
Payer: MEDICARE

## 2022-09-14 DIAGNOSIS — Z12.31 SCREENING MAMMOGRAM, ENCOUNTER FOR: Primary | ICD-10-CM

## 2022-09-15 ENCOUNTER — TELEPHONE (OUTPATIENT)
Dept: INTERNAL MEDICINE | Facility: CLINIC | Age: 77
End: 2022-09-15
Payer: MEDICARE

## 2022-09-15 NOTE — TELEPHONE ENCOUNTER
Pt needs her [facility] opportunity letter signed. She states that she has it fully filled out , but just needs the provider's signature. I notified the pt to drop the form off at the check in desk tomorrow, and that it should be ready by Monday. Pt understood.

## 2022-09-15 NOTE — TELEPHONE ENCOUNTER
----- Message from Christina Lopez sent at 9/15/2022  4:14 PM CDT -----  Contact: Patient [phone]  Good Afternoon  Patient need a letter sign so she can travel    Please call and advise

## 2022-11-04 ENCOUNTER — LAB VISIT (OUTPATIENT)
Dept: LAB | Facility: HOSPITAL | Age: 77
End: 2022-11-04
Attending: INTERNAL MEDICINE
Payer: MEDICARE

## 2022-11-04 ENCOUNTER — HOSPITAL ENCOUNTER (OUTPATIENT)
Dept: RADIOLOGY | Facility: HOSPITAL | Age: 77
Discharge: HOME | End: 2022-11-04
Attending: INTERNAL MEDICINE
Payer: MEDICARE

## 2022-11-04 VITALS — WEIGHT: 158 LBS | BODY MASS INDEX: 29.85 KG/M2

## 2022-11-04 DIAGNOSIS — E55.9 VITAMIN D DEFICIENCY DISEASE: ICD-10-CM

## 2022-11-04 DIAGNOSIS — Z12.31 SCREENING MAMMOGRAM, ENCOUNTER FOR: ICD-10-CM

## 2022-11-04 DIAGNOSIS — R73.01 IFG (IMPAIRED FASTING GLUCOSE): ICD-10-CM

## 2022-11-04 DIAGNOSIS — R53.83 FATIGUE, UNSPECIFIED TYPE: ICD-10-CM

## 2022-11-04 DIAGNOSIS — E78.2 MIXED HYPERLIPIDEMIA: ICD-10-CM

## 2022-11-04 LAB
ALBUMIN SERPL BCP-MCNC: 4.5 G/DL (ref 3.5–5.2)
ALP SERPL-CCNC: 50 U/L (ref 38–126)
ALT SERPL W/O P-5'-P-CCNC: 27 U/L (ref 10–44)
ANION GAP SERPL CALC-SCNC: 8 MMOL/L (ref 8–16)
AST SERPL-CCNC: 28 U/L (ref 15–46)
BASOPHILS # BLD AUTO: 0.03 K/UL (ref 0–0.2)
BASOPHILS NFR BLD: 0.6 % (ref 0–1.9)
BILIRUB SERPL-MCNC: 0.6 MG/DL (ref 0.1–1)
CALCIUM SERPL-MCNC: 9.3 MG/DL (ref 8.7–10.5)
CHLORIDE SERPL-SCNC: 101 MMOL/L (ref 95–110)
CHOLEST SERPL-MCNC: 136 MG/DL (ref 120–199)
CHOLEST/HDLC SERPL: 2.3 {RATIO} (ref 2–5)
CO2 SERPL-SCNC: 31 MMOL/L (ref 23–29)
CREAT SERPL-MCNC: 0.82 MG/DL (ref 0.5–1.4)
DIFFERENTIAL METHOD: NORMAL
EOSINOPHIL # BLD AUTO: 0.1 K/UL (ref 0–0.5)
EOSINOPHIL NFR BLD: 2 % (ref 0–8)
ERYTHROCYTE [DISTWIDTH] IN BLOOD BY AUTOMATED COUNT: 13.1 % (ref 11.5–14.5)
EST. GFR  (NO RACE VARIABLE): >60 ML/MIN/1.73 M^2
ESTIMATED AVG GLUCOSE: 111 MG/DL (ref 68–131)
GLUCOSE SERPL-MCNC: 99 MG/DL (ref 70–110)
HBA1C MFR BLD: 5.5 % (ref 4–5.6)
HCT VFR BLD AUTO: 38.8 % (ref 37–48.5)
HDLC SERPL-MCNC: 59 MG/DL (ref 40–75)
HDLC SERPL: 43.4 % (ref 20–50)
HGB BLD-MCNC: 12.7 G/DL (ref 12–16)
IMM GRANULOCYTES # BLD AUTO: 0.01 K/UL (ref 0–0.04)
IMM GRANULOCYTES NFR BLD AUTO: 0.2 % (ref 0–0.5)
LDLC SERPL CALC-MCNC: 52.4 MG/DL (ref 63–159)
LYMPHOCYTES # BLD AUTO: 2 K/UL (ref 1–4.8)
LYMPHOCYTES NFR BLD: 36.8 % (ref 18–48)
MCH RBC QN AUTO: 29.4 PG (ref 27–31)
MCHC RBC AUTO-ENTMCNC: 32.7 G/DL (ref 32–36)
MCV RBC AUTO: 90 FL (ref 82–98)
MONOCYTES # BLD AUTO: 0.5 K/UL (ref 0.3–1)
MONOCYTES NFR BLD: 8.3 % (ref 4–15)
NEUTROPHILS # BLD AUTO: 2.8 K/UL (ref 1.8–7.7)
NEUTROPHILS NFR BLD: 52.1 % (ref 38–73)
NONHDLC SERPL-MCNC: 77 MG/DL
NRBC BLD-RTO: 0 /100 WBC
PLATELET # BLD AUTO: 184 K/UL (ref 150–450)
PMV BLD AUTO: 10 FL (ref 9.2–12.9)
POTASSIUM SERPL-SCNC: 4.3 MMOL/L (ref 3.5–5.1)
PROT SERPL-MCNC: 7.2 G/DL (ref 6–8.4)
RBC # BLD AUTO: 4.32 M/UL (ref 4–5.4)
SODIUM SERPL-SCNC: 140 MMOL/L (ref 136–145)
TRIGL SERPL-MCNC: 123 MG/DL (ref 30–150)
TSH SERPL DL<=0.005 MIU/L-ACNC: 2.64 UIU/ML (ref 0.4–4)
UUN UR-MCNC: 21 MG/DL (ref 7–17)
WBC # BLD AUTO: 5.44 K/UL (ref 3.9–12.7)

## 2022-11-04 PROCEDURE — 80061 LIPID PANEL: CPT | Performed by: INTERNAL MEDICINE

## 2022-11-04 PROCEDURE — 80053 COMPREHEN METABOLIC PANEL: CPT | Mod: PO | Performed by: INTERNAL MEDICINE

## 2022-11-04 PROCEDURE — 77067 SCR MAMMO BI INCL CAD: CPT | Mod: 26,,, | Performed by: RADIOLOGY

## 2022-11-04 PROCEDURE — 77063 BREAST TOMOSYNTHESIS BI: CPT | Mod: 26,,, | Performed by: RADIOLOGY

## 2022-11-04 PROCEDURE — 77063 MAMMO DIGITAL SCREENING BILAT WITH TOMO: ICD-10-PCS | Mod: 26,,, | Performed by: RADIOLOGY

## 2022-11-04 PROCEDURE — 36415 COLL VENOUS BLD VENIPUNCTURE: CPT | Mod: PO | Performed by: INTERNAL MEDICINE

## 2022-11-04 PROCEDURE — 77067 MAMMO DIGITAL SCREENING BILAT WITH TOMO: ICD-10-PCS | Mod: 26,,, | Performed by: RADIOLOGY

## 2022-11-04 PROCEDURE — 85025 COMPLETE CBC W/AUTO DIFF WBC: CPT | Mod: PO | Performed by: INTERNAL MEDICINE

## 2022-11-04 PROCEDURE — 84443 ASSAY THYROID STIM HORMONE: CPT | Mod: PO | Performed by: INTERNAL MEDICINE

## 2022-11-04 PROCEDURE — 83036 HEMOGLOBIN GLYCOSYLATED A1C: CPT | Performed by: INTERNAL MEDICINE

## 2022-11-04 PROCEDURE — 77063 BREAST TOMOSYNTHESIS BI: CPT | Mod: TC

## 2022-11-08 RX ORDER — ROSUVASTATIN CALCIUM 10 MG/1
10 TABLET, COATED ORAL DAILY
Qty: 90 TABLET | Refills: 0 | Status: SHIPPED | OUTPATIENT
Start: 2022-11-08 | End: 2022-11-11 | Stop reason: SDUPTHER

## 2022-11-08 NOTE — TELEPHONE ENCOUNTER
Refill Decision Note   Ross Rodriguez  is requesting a refill authorization.  Brief Assessment and Rationale for Refill:  Approve     Medication Therapy Plan:       Medication Reconciliation Completed: No   Comments:     No Care Gaps recommended.     Note composed:11:24 AM 11/08/2022

## 2022-11-08 NOTE — TELEPHONE ENCOUNTER
No new care gaps identified.  [facility] Embedded Care Gaps. Reference number: [id number]. 11/07/2022   9:57:46 PM CST

## 2022-11-11 ENCOUNTER — HOSPITAL ENCOUNTER (OUTPATIENT)
Dept: RADIOLOGY | Facility: HOSPITAL | Age: 77
Discharge: HOME | End: 2022-11-11
Attending: INTERNAL MEDICINE
Payer: MEDICARE

## 2022-11-11 ENCOUNTER — OFFICE VISIT (OUTPATIENT)
Dept: INTERNAL MEDICINE | Facility: CLINIC | Age: 77
End: 2022-11-11
Payer: MEDICARE

## 2022-11-11 ENCOUNTER — TELEPHONE (OUTPATIENT)
Dept: INTERNAL MEDICINE | Facility: CLINIC | Age: 77
End: 2022-11-11
Payer: MEDICARE

## 2022-11-11 VITALS
WEIGHT: 157.63 LBS | DIASTOLIC BLOOD PRESSURE: 74 MMHG | OXYGEN SATURATION: 96 % | BODY MASS INDEX: 30.95 KG/M2 | HEART RATE: 67 BPM | SYSTOLIC BLOOD PRESSURE: 112 MMHG | HEIGHT: 60 IN

## 2022-11-11 DIAGNOSIS — M81.0 AGE-RELATED OSTEOPOROSIS WITHOUT CURRENT PATHOLOGICAL FRACTURE: ICD-10-CM

## 2022-11-11 DIAGNOSIS — J84.9 ILD (INTERSTITIAL LUNG DISEASE): ICD-10-CM

## 2022-11-11 DIAGNOSIS — M25.559 HIP PAIN: ICD-10-CM

## 2022-11-11 DIAGNOSIS — M54.9 DORSALGIA, UNSPECIFIED: ICD-10-CM

## 2022-11-11 DIAGNOSIS — Z00.00 ANNUAL PHYSICAL EXAM: Primary | ICD-10-CM

## 2022-11-11 DIAGNOSIS — I27.20 PULMONARY HYPERTENSION, UNSPECIFIED: ICD-10-CM

## 2022-11-11 DIAGNOSIS — M54.9 BILATERAL BACK PAIN, UNSPECIFIED BACK LOCATION, UNSPECIFIED CHRONICITY: ICD-10-CM

## 2022-11-11 DIAGNOSIS — I77.819 AORTIC ECTASIA: ICD-10-CM

## 2022-11-11 DIAGNOSIS — F41.9 ANXIETY: ICD-10-CM

## 2022-11-11 DIAGNOSIS — E78.2 MIXED HYPERLIPIDEMIA: ICD-10-CM

## 2022-11-11 PROBLEM — M62.81 MUSCLE WEAKNESS OF LEFT ARM: Status: RESOLVED | Noted: 2020-10-05 | Resolved: 2022-11-11

## 2022-11-11 PROCEDURE — 1101F PR PT FALLS ASSESS DOC 0-1 FALLS W/OUT INJ PAST YR: ICD-10-PCS | Mod: CPTII,S$GLB,, | Performed by: INTERNAL MEDICINE

## 2022-11-11 PROCEDURE — 72100 X-RAY EXAM L-S SPINE 2/3 VWS: CPT | Mod: TC

## 2022-11-11 PROCEDURE — 72070 XR THORACIC SPINE AP LATERAL: ICD-10-PCS | Mod: 26,,, | Performed by: RADIOLOGY

## 2022-11-11 PROCEDURE — 1101F PT FALLS ASSESS-DOCD LE1/YR: CPT | Mod: CPTII,S$GLB,, | Performed by: INTERNAL MEDICINE

## 2022-11-11 PROCEDURE — 1126F AMNT PAIN NOTED NONE PRSNT: CPT | Mod: CPTII,S$GLB,, | Performed by: INTERNAL MEDICINE

## 2022-11-11 PROCEDURE — 1126F PR PAIN SEVERITY QUANTIFIED, NO PAIN PRESENT: ICD-10-PCS | Mod: CPTII,S$GLB,, | Performed by: INTERNAL MEDICINE

## 2022-11-11 PROCEDURE — 3288F FALL RISK ASSESSMENT DOCD: CPT | Mod: CPTII,S$GLB,, | Performed by: INTERNAL MEDICINE

## 2022-11-11 PROCEDURE — 72100 X-RAY EXAM L-S SPINE 2/3 VWS: CPT | Mod: 26,,, | Performed by: RADIOLOGY

## 2022-11-11 PROCEDURE — 99999 PR PBB SHADOW E&M-EST. PATIENT-LVL V: CPT | Mod: PBBFAC,,, | Performed by: INTERNAL MEDICINE

## 2022-11-11 PROCEDURE — 99499 RISK ADDL DX/OHS AUDIT: ICD-10-PCS | Mod: S$GLB,,, | Performed by: INTERNAL MEDICINE

## 2022-11-11 PROCEDURE — 99999 PR PBB SHADOW E&M-EST. PATIENT-LVL V: ICD-10-PCS | Mod: PBBFAC,,, | Performed by: INTERNAL MEDICINE

## 2022-11-11 PROCEDURE — 72070 X-RAY EXAM THORAC SPINE 2VWS: CPT | Mod: 26,,, | Performed by: RADIOLOGY

## 2022-11-11 PROCEDURE — 99397 PR PREVENTIVE VISIT,EST,65 & OVER: ICD-10-PCS | Mod: GZ,S$GLB,, | Performed by: INTERNAL MEDICINE

## 2022-11-11 PROCEDURE — 1159F MED LIST DOCD IN RCRD: CPT | Mod: CPTII,S$GLB,, | Performed by: INTERNAL MEDICINE

## 2022-11-11 PROCEDURE — 72070 X-RAY EXAM THORAC SPINE 2VWS: CPT | Mod: TC

## 2022-11-11 PROCEDURE — 3074F PR MOST RECENT SYSTOLIC BLOOD PRESSURE < 130 MM HG: ICD-10-PCS | Mod: CPTII,S$GLB,, | Performed by: INTERNAL MEDICINE

## 2022-11-11 PROCEDURE — 3074F SYST BP LT 130 MM HG: CPT | Mod: CPTII,S$GLB,, | Performed by: INTERNAL MEDICINE

## 2022-11-11 PROCEDURE — 73521 X-RAY EXAM HIPS BI 2 VIEWS: CPT | Mod: 26,,, | Performed by: RADIOLOGY

## 2022-11-11 PROCEDURE — 73521 XR HIPS BILATERAL 2 VIEW INCL AP PELVIS: ICD-10-PCS | Mod: 26,,, | Performed by: RADIOLOGY

## 2022-11-11 PROCEDURE — 3288F PR FALLS RISK ASSESSMENT DOCUMENTED: ICD-10-PCS | Mod: CPTII,S$GLB,, | Performed by: INTERNAL MEDICINE

## 2022-11-11 PROCEDURE — 1159F PR MEDICATION LIST DOCUMENTED IN MEDICAL RECORD: ICD-10-PCS | Mod: CPTII,S$GLB,, | Performed by: INTERNAL MEDICINE

## 2022-11-11 PROCEDURE — 3078F DIAST BP <80 MM HG: CPT | Mod: CPTII,S$GLB,, | Performed by: INTERNAL MEDICINE

## 2022-11-11 PROCEDURE — 73521 X-RAY EXAM HIPS BI 2 VIEWS: CPT | Mod: TC

## 2022-11-11 PROCEDURE — 99397 PER PM REEVAL EST PAT 65+ YR: CPT | Mod: GZ,S$GLB,, | Performed by: INTERNAL MEDICINE

## 2022-11-11 PROCEDURE — 72100 XR LUMBAR SPINE AP AND LATERAL: ICD-10-PCS | Mod: 26,,, | Performed by: RADIOLOGY

## 2022-11-11 PROCEDURE — 99499 UNLISTED E&M SERVICE: CPT | Mod: S$GLB,,, | Performed by: INTERNAL MEDICINE

## 2022-11-11 PROCEDURE — 3078F PR MOST RECENT DIASTOLIC BLOOD PRESSURE < 80 MM HG: ICD-10-PCS | Mod: CPTII,S$GLB,, | Performed by: INTERNAL MEDICINE

## 2022-11-11 RX ORDER — HYDROXYZINE HYDROCHLORIDE 25 MG/1
25 TABLET, FILM COATED ORAL DAILY PRN
Qty: 90 TABLET | Refills: 1 | Status: SHIPPED | OUTPATIENT
Start: 2022-11-11 | End: 2023-11-14

## 2022-11-11 RX ORDER — ROSUVASTATIN CALCIUM 10 MG/1
10 TABLET, COATED ORAL DAILY
Qty: 90 TABLET | Refills: 3 | Status: SHIPPED | OUTPATIENT
Start: 2022-11-11 | End: 2023-11-14 | Stop reason: SDUPTHER

## 2022-11-11 RX ORDER — METOPROLOL SUCCINATE 25 MG/1
25 TABLET, EXTENDED RELEASE ORAL DAILY
Qty: 90 TABLET | Refills: 3 | Status: SHIPPED | OUTPATIENT
Start: 2022-11-11 | End: 2023-11-14 | Stop reason: SDUPTHER

## 2022-11-11 NOTE — TELEPHONE ENCOUNTER
Returned pt's call. She would like Dr. Nolasco to review her x-rays from today and let her know how to proceed based on the results

## 2022-11-11 NOTE — TELEPHONE ENCOUNTER
----- Message from Carolyn Spain sent at 11/11/2022 12:25 PM CST -----  Regarding: Contact pt  Please contact pt about test results

## 2022-11-11 NOTE — PROGRESS NOTES
Patient ID: Rsos Rodriguez is a 77 y.o. female.    Chief Complaint: Annual Exam      Assessment:       1. Annual physical exam    2. Aortic ectasia: ascending aorta 4.7 cm 2016; stable 2020 follows in CTS; enlarged to 4.9 8/21; stable 9/22    3. Pulmonary hypertension, unspecified: see echo 2019    4. Mixed hyperlipidemia    5. ILD (interstitial lung disease): see CT 2019, also 2020; stable 8/21; stable 9/22    6. Age-related osteoporosis; see DEXA 2019    7. Bilateral back pain, unspecified back location, unspecified chronicity    8. Dorsalgia, unspecified    9. Hip pain    10. Anxiety            Plan:         Ross was seen today for annual exam.    Diagnoses and all orders for this visit:    Annual physical exam    Aortic ectasia: ascending aorta 4.7 cm 2016; stable 2020 follows in CTS; enlarged to 4.9 8/21; stable 9/22    Pulmonary hypertension, unspecified: see echo 2019: no alarm sx    Mixed hyperlipidemia: doing well on regimen    ILD (interstitial lung disease): see CT 2019, also 2020; stable 8/21; stable 9/22; no alarm sx    Age-related osteoporosis; see DEXA 2019, also 2021: On Prolia.  Endo follow up scheduled for January    Dorsalgia, unspecified  -     X-Ray Lumbar Spine Ap And Lateral  -     X-Ray Thoracic Spine AP Lateral; Future    Hip pain  -     X-Ray Hips Bilateral 2 View Inc AP Pelvis; Future    Anxiety: mild.  Healthy habits.  May use hydroxyzine as needed; cautions and s/e reviewed.  Follow up poor results    Other orders  -     metoprolol succinate (TOPROL-XL) 25 MG 24 hr tablet; Take 1 tablet (25 mg total) by mouth once daily.  -     rosuvastatin (CRESTOR) 10 MG tablet; Take 1 tablet (10 mg total) by mouth once daily.  -     hydrOXYzine HCL (ATARAX) 25 MG tablet; Take 1 tablet (25 mg total) by mouth daily as needed for Anxiety.     COVID booster discussed  Natural history, exercises, hygienic measures and ED cautions for back/hip pain reviewed  I will review all studies and determine  further tx depending on findings       Subjective:   Annual exam    Doing well other than some ongoing back pain.  No trauma or injury.  She is trying to do stretcehs and exercise.  No fever, chills, sweats or GI or  sx.    Other medical problems stable; sees CTS annually and CT scan 9/22 stable.    Labs acceptable.    UTD with screenings and immunizations.    Patient Active Problem List:     Gastroesophageal reflux disease without esophagitis     Nuclear sclerosis - Both Eyes     Tinnitus     Diverticulosis of small and large intestine see CT scan 7/15     Mixed hyperlipidemia     Aortic ectasia: ascending aorta 4.7 cm 2016; stable 2020 follows in CTS; enlarged to 4.9 8/21; stable 9/22     Lung cyst: R side see CT 2009     Liver cyst: see CT 2009; NOT seen on u/s 2018     Primary insomnia     Age-related osteoporosis; see DEXA 2019 also 2021     Aortic atherosclerosis: see CT scan 7/18; stable 8/20; stable 8/21     ILD (interstitial lung disease): see CT 2019, also 2020; stable 8/21; stable 9/22     Chronic pain of both shoulders     Decreased ROM of left shoulder     Pulmonary hypertension, unspecified: see ECHO 2019       Review of Systems   Constitutional:  Negative for activity change and unexpected weight change.   HENT:  Negative for hearing loss, rhinorrhea and trouble swallowing.    Eyes:  Negative for discharge and visual disturbance.   Respiratory:  Negative for chest tightness and wheezing.    Cardiovascular:  Negative for chest pain and palpitations.   Gastrointestinal:  Negative for blood in stool, constipation, diarrhea and vomiting.   Endocrine: Negative for polydipsia and polyuria.   Genitourinary:  Negative for difficulty urinating, dysuria, hematuria and menstrual problem.   Musculoskeletal:  Positive for arthralgias and back pain. Negative for joint swelling and neck pain.        Back and sometimes hip pain  No trauma  No weakness or instability  No GI or  sx   Neurological:  Negative for  weakness and headaches.   Psychiatric/Behavioral:  Negative for confusion and dysphoric mood.         Mild anxiety, no depression  No SI or HI  Stays busy, sleeps well, exercises  Sometimes gets to ruminating and feels nervous; not too frequent       Objective:      Physical Exam  Vitals and nursing note reviewed.   Constitutional:       Appearance: She is well-developed.   HENT:      Head: Normocephalic and atraumatic.      Right Ear: External ear normal.      Left Ear: External ear normal.      Nose: Nose normal.      Mouth/Throat:      Pharynx: No oropharyngeal exudate.   Eyes:      General: No scleral icterus.     Extraocular Movements: Extraocular movements intact.      Conjunctiva/sclera: Conjunctivae normal.   Neck:      Thyroid: No thyromegaly.      Vascular: No JVD.   Cardiovascular:      Rate and Rhythm: Normal rate and regular rhythm.      Heart sounds: Normal heart sounds. No murmur heard.    No gallop.   Pulmonary:      Effort: Pulmonary effort is normal. No respiratory distress.      Breath sounds: Normal breath sounds. No wheezing.   Abdominal:      General: Bowel sounds are normal. There is no distension.      Palpations: Abdomen is soft. There is no mass.      Tenderness: There is no abdominal tenderness. There is no guarding or rebound.   Musculoskeletal:         General: No tenderness. Normal range of motion.      Cervical back: Normal range of motion and neck supple.      Comments: No CVA pain.  Negative SLR.  Strength UE and LE wnl.  No pain over spine   Lymphadenopathy:      Cervical: No cervical adenopathy.   Skin:     General: Skin is warm and dry.      Findings: No erythema or rash.   Neurological:      General: No focal deficit present.      Mental Status: She is alert and oriented to person, place, and time.      Cranial Nerves: No cranial nerve deficit.      Coordination: Coordination normal.      Deep Tendon Reflexes: Reflexes normal.   Psychiatric:         Behavior: Behavior normal.          Thought Content: Thought content normal.         Judgment: Judgment normal.           Health Maintenance Due   Topic Date Due    COVID-19 Vaccine (5 - Booster for Pfizer series) 09/13/2022

## 2022-11-11 NOTE — TELEPHONE ENCOUNTER
Spoke to patient and advised of x ray results ans recommendation. Patient verbalized understanding.       Patient would like message sent to portal to review her options in dept.    Message sent to portal.

## 2022-11-11 NOTE — TELEPHONE ENCOUNTER
Thank you, it looks like she has mild arthritis.  There is nothing that looks like a fracture or dislocation.  I would recommend considering another course of physical therapy for her back.  There is a great program called healthy [facility] which I could order.  [facility] also [facility].

## 2022-11-15 ENCOUNTER — PATIENT MESSAGE (OUTPATIENT)
Dept: INTERNAL MEDICINE | Facility: CLINIC | Age: 77
End: 2022-11-15
Payer: MEDICARE

## 2022-11-15 DIAGNOSIS — M54.9 BILATERAL BACK PAIN, UNSPECIFIED BACK LOCATION, UNSPECIFIED CHRONICITY: Primary | ICD-10-CM

## 2022-11-15 NOTE — TELEPHONE ENCOUNTER
Okay to let her know by my [facility] that I put a referral in for physical therapy and they should be contacting her soon.  Thank you

## 2022-11-17 ENCOUNTER — PATIENT MESSAGE (OUTPATIENT)
Dept: INTERNAL MEDICINE | Facility: CLINIC | Age: 77
End: 2022-11-17
Payer: MEDICARE

## 2022-12-06 ENCOUNTER — NURSE TRIAGE (OUTPATIENT)
Dept: ADMINISTRATIVE | Facility: CLINIC | Age: 77
End: 2022-12-06
Payer: MEDICARE

## 2022-12-06 NOTE — TELEPHONE ENCOUNTER
Cough x 3 days using OTC cough medicine with DM today. No known sick contact.     Advised per protocol.     Declines appt at this time. Will montor s/s x 24 hours and see how she is tomorrow. Call back precautions for SOB or CP.  Reason for Disposition   Allergy symptoms are also present (e.g., itchy eyes, clear nasal discharge, postnasal drip)    Additional Information   Negative: Bluish (or gray) lips or face   Negative: SEVERE difficulty breathing (e.g., struggling for each breath, speaks in single words)   Negative: Rapid onset of cough and has hives   Negative: Coughing started suddenly after medicine, an allergic food or bee sting   Negative: Difficulty breathing after exposure to flames, smoke, or fumes   Negative: Sounds like a life-threatening emergency to the triager   Negative: MODERATE difficulty breathing (e.g., speaks in phrases, SOB even at rest, pulse 100-120) and still present when not coughing   Negative: Chest pain present when not coughing   Negative: Passed out (i.e., fainted, collapsed and was not responding)   Negative: Patient sounds very sick or weak to the triager   Negative: MILD difficulty breathing (e.g., minimal/no SOB at rest, SOB with walking, pulse <100) and still present when not coughing   Negative: Coughed up > 1 tablespoon (15 ml) blood (Exception: Blood-tinged sputum.)   Negative: Fever > 103 F (39.4 C)   Negative: Fever > 101 F (38.3 C) and over 60 years of age   Negative: Fever > 100.0 F (37.8 C) and has diabetes mellitus or a weak immune system (e.g., HIV positive, cancer chemotherapy, organ transplant, splenectomy, chronic steroids)   Negative: Fever > 100.0 F (37.8 C) and bedridden (e.g., nursing home patient, stroke, chronic illness, recovering from surgery)   Negative: Increasing ankle swelling   Negative: Wheezing is present   Negative: SEVERE coughing spells (e.g., whooping sound after coughing, vomiting after coughing)   Negative: Coughing up jensen-colored  (reddish-brown) or blood-tinged sputum   Negative: Fever present > 3 days (72 hours)   Negative: Fever returns after gone for over 24 hours and symptoms worse or not improved   Negative: Using nasal washes and pain medicine > 24 hours and sinus pain persists   Negative: Known COPD or other severe lung disease (i.e., bronchiectasis, cystic fibrosis, lung surgery) and worsening symptoms (i.e., increased sputum purulence or amount, increased breathing difficulty)   Negative: Continuous (nonstop) coughing interferes with work or school and no improvement using cough treatment per Care Advice   Negative: Patient wants to be seen    Protocols used: Cough-A-OH

## 2022-12-10 ENCOUNTER — PATIENT MESSAGE (OUTPATIENT)
Dept: INTERNAL MEDICINE | Facility: CLINIC | Age: 77
End: 2022-12-10
Payer: MEDICARE

## 2022-12-12 ENCOUNTER — INFUSION (OUTPATIENT)
Dept: INFECTIOUS DISEASES | Facility: HOSPITAL | Age: 77
End: 2022-12-12
Payer: MEDICARE

## 2022-12-12 VITALS
TEMPERATURE: 97 F | WEIGHT: 157.19 LBS | HEART RATE: 73 BPM | SYSTOLIC BLOOD PRESSURE: 155 MMHG | BODY MASS INDEX: 30.7 KG/M2 | DIASTOLIC BLOOD PRESSURE: 66 MMHG

## 2022-12-12 DIAGNOSIS — M81.0 AGE-RELATED OSTEOPOROSIS WITHOUT CURRENT PATHOLOGICAL FRACTURE: Primary | ICD-10-CM

## 2022-12-12 PROCEDURE — 96372 THER/PROPH/DIAG INJ SC/IM: CPT

## 2022-12-12 PROCEDURE — 63600175 PHARM REV CODE 636 W HCPCS: Mod: JG | Performed by: NURSE PRACTITIONER

## 2022-12-12 RX ADMIN — DENOSUMAB 60 MG: 60 INJECTION SUBCUTANEOUS at 11:12

## 2022-12-12 NOTE — TELEPHONE ENCOUNTER
Spoke to patient she went to Urgent care on Saturday for bronchitis, and was concerned about all the medication she was prescribed.      Explained what each medication was and what symptom they help relieve.      Patient is still concerned about taking Methylprednisolone 4mg dosepk, and  azithromycin 250mg.    She still would like the opinion of her pcp.

## 2022-12-12 NOTE — TELEPHONE ENCOUNTER
Okay to send her my [facility] message, I agree with the antibiotics and the different sprays along with the cough medicine.  If she wants to defer on the steroids temporarily, that is probably reasonable as long as she is improving.

## 2022-12-12 NOTE — PROGRESS NOTES
Patient received Prolia 60 mg injection sub Q in the left arm. Denies dental surgery < 3 months. Patient tolerated injection well and left in NAD.    Patient is taking Calcium and Vitamin D

## 2022-12-19 ENCOUNTER — CLINICAL SUPPORT (OUTPATIENT)
Dept: REHABILITATION | Facility: HOSPITAL | Age: 77
End: 2022-12-19
Payer: MEDICARE

## 2022-12-19 DIAGNOSIS — M54.9 BILATERAL BACK PAIN, UNSPECIFIED BACK LOCATION, UNSPECIFIED CHRONICITY: ICD-10-CM

## 2022-12-19 DIAGNOSIS — R29.898 WEAKNESS OF BOTH HIPS: Primary | ICD-10-CM

## 2022-12-19 PROCEDURE — 97110 THERAPEUTIC EXERCISES: CPT | Mod: PO

## 2022-12-19 PROCEDURE — 97161 PT EVAL LOW COMPLEX 20 MIN: CPT | Mod: PO

## 2022-12-19 NOTE — PROGRESS NOTES
[facility]  Physical Therapy Initial Evaluation    Name: Ross Rodriguez  Clinic Number: [id number]    Therapy Diagnosis:   Encounter Diagnosis   Name Primary?    Bilateral back pain, unspecified back location, unspecified chronicity      Physician: Nahomi Nolasco MD    Physician Orders: PT Eval and Treat  Medical Diagnosis from Referral: M54.9 (ICD-10-CM) - Bilateral back pain, unspecified back location, unspecified chronicity  Evaluation Date: 12/19/2022  Authorization Period Expiration: 01/16/2023  Plan of Care Expiration: 3/19/2023  Visit # / Visits authorized: 1/ 1    Time In: 1405  Time Out: 1500  Total Billable Time: 55 minutes    Precautions: Standard    Subjective     Date of onset: 20 years   History of current condition - Ross reports: history of low back pain and arthritis that has been getting worse over the past year. Patient states she used to be able to do activities of daily living for longer periods of time but now needs to take more frequent breaks due to back pain. Pain is currently located in the right lower back and radiates to the right calf at times. Symptoms are achy with electrical jolts. Symptoms are worse with prolonged sitting/standing and lying supine.      Patient is recovering from bronchitis. She goes to the gym 3x/week and uses treadmill and upper body machines. She has history of bilateral foot fracture.     Imaging     X-Ray Lumbar Spine Ap And Lateral: Mild DJD and lumbar scoliosis.  The lumbosacral disc space is narrowed.  Mild narrowing of the L3/L4 disc space also identified.  No fracture, spondylolisthesis or bone destruction.  X-Ray Hips Bilateral 2 View Inc AP Pelvis: Mild DJD.  No fracture or dislocation.  No bone destruction identified  X-Ray Thoracic Spine AP Lateral: DJD and thoracic scoliosis.  No fracture or dislocation.  No bone destruction identified          Prior Therapy: for lower back 20 years ago  Exercise Routine/Sport  "Participation: volunteer work,   Social History: lives alone  Occupation: retired  Prior Level of Function: IADLs  Current Level of Function: IADLs    Pain:  Current 2/10, worst 8/10, best 0/10   Location: right low back radiating down to right knee and occasionally to calf   Description: Aching with electrical pain in the RLE   Aggravating Factors: prolonged standing/sitting, lying flat on back   Easing Factors: TENS unit and lidocaine    Pts goals: "be able to control pain better"       Medical History:   Past Medical History:   Diagnosis Date    Allergy     Anxiety     Aortic atherosclerosis: see CT scan 7/18 8/21/2018    Arthritis     Cataract     Depression     Diverticulosis 7/21/2015    GERD (gastroesophageal reflux disease)     Hyperlipidemia     Joint pain     Liver cyst: see CT 2009 6/21/2016    Lower back pain 7/29/2019    Lung cyst: R side see CT 2009 6/21/2016    Nuclear sclerosis - Both Eyes 2/14/2014    Osteoporosis     Primary insomnia 6/21/2016    Primary insomnia 6/21/2016       Surgical History:   Ross Rodriguez  has a past surgical history that includes Cholecystectomy; Dilation and curettage of uterus; and Scroggins tooth extraction.    Medications:   Ross has a current medication list which includes the following prescription(s): acetaminophen, calcium citrate-vitamin d2, clotrimazole-betamethasone 1-0.05%, denosumab, fish oil-omega-3 fatty acids, flaxseed, hydroxyzine hcl, lorazepam, metoprolol succinate, multivitamin, and rosuvastatin.    Allergies:   Review of patient's allergies indicates:   Allergen Reactions    Fosamax [alendronate] Nausea Only    Nsaids (non-steroidal anti-inflammatory drug) Hives    Sulfa (sulfonamide antibiotics) Hives        Objective     Posture:  forward head rounded shoulders       Functional Movements:   Gait: unremarkable   Squat: unremarkable, no pain  SLS: 5 seconds bilaterally     Lumbar Range of Motion:    % Limitation Pain   Flexion 0   Mild pain right low " back        Extension 0   -        Left Side Bending 0 -        Right Side Bending 0 -        Left rotation   0 -        Right Rotation   0 -           REPEATED TEST MOVEMENTS:  Repeated Flexion in Standing no effect   Repeated Extension in Standing no effect   Repeated Flexion in lying    Repeated Extension in lying         Lower Extremity Strength   Right LE Left LE   Knee extension: 4+/5 4+/5   Knee flexion: 4/5 4/5   Hip flexion: 4/5 4/5   Hip extension:  3+/5 3+/5   Hip Abduction: 4-/5 4-/5   Hip ER:  4/5 4/5   Hip IR: 4/5 4/5   Ankle dorsiflexion: 5/5 5/5   Ankle plantarflexion: 5/5 5/5     Special Tests:    Quadrant + (right extension   Spring Test  NT   Prone Instability Test NT   Prone LE Extension +   Bridge Test    Shear Test        SIJ  Right  Left    Thigh Thrust + -   Compression - -   Distraction - -   Sacral Thrust + -      Right  Left    FADIR - -   GAVI - -   Hip Scour - -   SLS Glute Med Test + +   Prone LE Extension + +         Neuro Tension Testing:   Sciatic nerve:      SLR: R = -     L = -   Slump Test:    R = +     L = -    Femoral Nerve Glide: NT      Joint Mobility: painful CPA L2-4    Palpation: no Tenderness to palpation noted     Sensation: intact, no deficits            CMS Impairment/Limitation/Restriction for FOTO Back Survey    Therapist reviewed FOTO scores for Ross Rodriguez on 12/19/2022.   FOTO documents entered into [employer] - see Media section.    Limitation Score: see media  Category: Mobility       Treatment     Treatment Time In: 1435  Treatment Time Out: 1455  Total Treatment time separate from Evaluation: 20 minutes     Ross received the treatments listed below:      Therapeutic exercises to develop strength, endurance, ROM, flexibility, posture, and core stabilization for 20 minutes including:  Glut bridges  BTB aris  Child's pose     Manual therapy techniques: Joint mobilizations and Soft tissue Mobilization were applied to the: low back for 00 minutes,  including:        Home Exercises and Patient Education Provided     Education provided:   - Prognosis, activity modification, goals for therapy, role of therapy for care, exercises/HEP    Written Home Exercises Provided: yes.  Exercises were reviewed and Ross was able to demonstrate them prior to the end of the session.   Pt received a written copy of exercises to perform at home. Ross demonstrated good  understanding of the education provided.     See EMR under patient instructions for exercises given.     Assessment     Ross is a 77 y.o. female referred to outpatient Physical Therapy with chronic low back pain. Overall, pt demonstrates low pain levels and irritability. She was able to tolerate all interventions without issue. Patient exhibited some radicular symptoms with positive slump test and weakness of the bilateral gluteals. We discussed activity modification, including taking frequent breaks from sitting or standing in static postures, and walking for 30 minutes on days when she does not go to the gym.       Pt will benefit from skilled outpatient Physical Therapy to address the deficits stated above and in the chart below, provide pt/family education, and to maximize pt's level of independence. Pt prognosis is Good.     Plan of care discussed with patient: Yes  Pt's spiritual, cultural and educational needs considered and patient is agreeable to the plan of care and goals as stated below:       Anticipated Barriers for therapy: none      Medical Necessity is demonstrated by the following  History  Co-morbidities and personal factors that may impact the plan of care Co-morbidities:   advanced age, anxiety, depression, high BMI, and aortic atherosclerosis , osteoporosis    Personal Factors:   age     low   Examination  Body Structures and Functions, activity limitations and participation restrictions that may impact the plan of care Body Regions:   back  lower extremities    Body Systems:    gross  "symmetry  ROM  strength  gross coordinated movement  balance  gait  transfers  transitions  motor control  motor learning    Participation Restrictions:       Activity limitations:   Learning and applying knowledge  No deficit    General Tasks and Commands  No deficit    Communication  No deficit    Mobility  lifting and carrying objects  walking    Self care  No deficit    Domestic Life  No deficit    Interactions/Relationships  No deficit    Life Areas  Recreational Activities to A with health and wellness     Community and Social Life  No deficit          low   Clinical Presentation stable and uncomplicated low   Decision Making/ Complexity Score: low     GOALS:   Short Term Goals:  4 weeks  1.Report decreased low back pain  < / =  4/10 at worst to increase tolerance for HEP/ADLs  2. Increase strength by 1/3 MMT grade in bilateral gluteals/core  to increase tolerance for ADL and work activities.  4. Pt to tolerate HEP to improve ROM and independence with ADL's    Long Term Goals: 12 weeks  1.Report decreased low back pain < / = 2/10 at max to increase tolerance for HEP/ADL/hobbies  2.Patient goal: "be able to control pain better"   3.Increase strength to 4+/5 in  bilateral gluteals/core  to increase tolerance for ADL and work activities.  4. Pt will report at >90% on FOTO  to demonstrate increase in LE function with every day tasks.      Plan   Plan of care Certification: 12/19/2022 to 3/19/2023.    Outpatient Physical Therapy 1-2 times weekly for 12 weeks to include the following interventions: Aquatic Therapy, Cervical/Lumbar Traction, Electrical Stimulation NMES, Gait Training, Manual Therapy, Moist Heat/ Ice, Neuromuscular Re-ed, Orthotic Management and Training, Patient Education, Self Care, Therapeutic Activities, and Therapeutic Exercise.     Joselito Morelos, PT , DPT    "
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
## 2023-01-04 ENCOUNTER — CLINICAL SUPPORT (OUTPATIENT)
Dept: REHABILITATION | Facility: HOSPITAL | Age: 78
End: 2023-01-04
Payer: MEDICARE

## 2023-01-04 DIAGNOSIS — R29.898 WEAKNESS OF BOTH HIPS: ICD-10-CM

## 2023-01-04 DIAGNOSIS — M54.50 ACUTE BILATERAL LOW BACK PAIN WITHOUT SCIATICA: Primary | ICD-10-CM

## 2023-01-04 PROCEDURE — 97110 THERAPEUTIC EXERCISES: CPT | Mod: PO

## 2023-01-04 PROCEDURE — 97140 MANUAL THERAPY 1/> REGIONS: CPT | Mod: PO

## 2023-01-04 PROCEDURE — 97112 NEUROMUSCULAR REEDUCATION: CPT | Mod: PO

## 2023-01-04 NOTE — PROGRESS NOTES
"                            Physical Therapy Daily Treatment Note     Name: Ross Rodriguez  Clinic Number: [id number]    Therapy Diagnosis:   Encounter Diagnoses   Name Primary?    Acute bilateral low back pain without sciatica Yes    Weakness of both hips      Physician: Nahomi Nolasco MD    Visit Date: 1/4/2023    Physician Orders: PT Eval and Treat  Medical Diagnosis from Referral: M54.9 (ICD-10-CM) - Bilateral back pain, unspecified back location, unspecified chronicity  Evaluation Date: 12/19/2022  Authorization Period Expiration: 01/16/2023  Plan of Care Expiration: 3/19/2023  Visit # / Visits authorized: 1/ 20     Time In: 1000  Time Out: 1045  Total Billable Time: 45 minutes     Precautions: Standard    Subjective     Pt reports: overall back is feeling good. She is having.  She was compliant with home exercise program. 3x/week    Response to previous treatment: no adverse effect  Functional change: in progress     Pain:  Current 2/10, worst 8/10, best 0/10   Location: right low back radiating down to right knee and occasionally to calf   Description: Aching with electrical pain in the RLE   Aggravating Factors: prolonged standing/sitting, lying flat on back   Easing Factors: TENS unit and lidocaine     Pts goals: "be able to control pain better"        Objective         Treatment      Therapeutic exercises to develop strength, endurance, ROM, flexibility, posture, and core stabilization for 29 minutes including:    Right hip assessment  RTB glut bridges 3x10  RTB clamshells x10 --- patient had trouble appreciating gluteal muscle activation  Side-lying hip abduction 2x10 B   Half kneeling hip flexor stretch 10x5" B        Manual therapy techniques: Joint mobilizations and Soft tissue Mobilization were applied to the: low back for 08 minutes, including:     Right FA joint inferior/lateral glides     Ross participated in neuromuscular re-education activities to improve: Balance, Coordination, Kinesthetic, " "Sense, Proprioception, and Posture for 08 minutes, including:    SLS balance     Ross participated in dynamic functional therapeutic activities to improve functional performance for 00  minutes, including:      Home Exercises Provided and Patient Education Provided     Education provided:   - Prognosis, activity modification, goals for therapy, role of therapy for care, exercises/HEP    Written Home Exercises Provided: Patient instructed to cont prior HEP.  Exercises were reviewed and Rosswas able to demonstrate them prior to the end of the session.  Ross demonstrated good  understanding of the education provided.     See EMR under Patient Instructions for exercises provided prior visit.      Assessment     Margarito performed well. Did not have any symptoms throughout session. Patient required multiple cueing to activate gluteal muscles with side-lying interventions.     Ross Is progressing well towards her goals.   Pt prognosis is Good.     Pt will continue to benefit from skilled outpatient physical therapy to address the deficits listed in the problem list box on initial evaluation, provide pt/family education and to maximize pt's level of independence in the home and community environment.     Pt's spiritual, cultural and educational needs considered and pt agreeable to plan of care and goals.    Anticipated barriers to physical therapy: none       GOALS:   Short Term Goals:  4 weeks  1.Report decreased low back pain  < / =  4/10 at worst to increase tolerance for HEP/ADLs  2. Increase strength by 1/3 MMT grade in bilateral gluteals/core  to increase tolerance for ADL and work activities.  4. Pt to tolerate HEP to improve ROM and independence with ADL's     Long Term Goals: 12 weeks  1.Report decreased low back pain < / = 2/10 at max to increase tolerance for HEP/ADL/hobbies  2.Patient goal: "be able to control pain better"   3.Increase strength to 4+/5 in  bilateral gluteals/core  to increase " tolerance for ADL and work activities.  4. Pt will report at >90% on FOTO  to demonstrate increase in LE function with every day tasks.       Plan   Plan of care Certification: 12/19/2022 to 3/19/2023.     Outpatient Physical Therapy 1-2 times weekly for 12 weeks to include the following interventions: Aquatic Therapy, Cervical/Lumbar Traction, Electrical Stimulation NMES, Gait Training, Manual Therapy, Moist Heat/ Ice, Neuromuscular Re-ed, Orthotic Management and Training, Patient Education, Self Care, Therapeutic Activities, and Therapeutic Exercise.     Joselito Morelos, PT, DPT

## 2023-01-05 ENCOUNTER — IMMUNIZATION (OUTPATIENT)
Dept: PRIMARY CARE CLINIC | Facility: CLINIC | Age: 78
End: 2023-01-05
Payer: MEDICARE

## 2023-01-05 ENCOUNTER — OFFICE VISIT (OUTPATIENT)
Dept: ENDOCRINOLOGY | Facility: CLINIC | Age: 78
End: 2023-01-05
Payer: MEDICARE

## 2023-01-05 VITALS
DIASTOLIC BLOOD PRESSURE: 65 MMHG | BODY MASS INDEX: 30.2 KG/M2 | HEIGHT: 61 IN | OXYGEN SATURATION: 98 % | HEART RATE: 69 BPM | WEIGHT: 159.94 LBS | SYSTOLIC BLOOD PRESSURE: 138 MMHG

## 2023-01-05 DIAGNOSIS — Z23 NEED FOR VACCINATION: Primary | ICD-10-CM

## 2023-01-05 DIAGNOSIS — E78.2 MIXED HYPERLIPIDEMIA: ICD-10-CM

## 2023-01-05 DIAGNOSIS — M81.0 AGE-RELATED OSTEOPOROSIS WITHOUT CURRENT PATHOLOGICAL FRACTURE: Primary | ICD-10-CM

## 2023-01-05 PROCEDURE — 1159F PR MEDICATION LIST DOCUMENTED IN MEDICAL RECORD: ICD-10-PCS | Mod: CPTII,S$GLB,, | Performed by: NURSE PRACTITIONER

## 2023-01-05 PROCEDURE — 1126F AMNT PAIN NOTED NONE PRSNT: CPT | Mod: CPTII,S$GLB,, | Performed by: NURSE PRACTITIONER

## 2023-01-05 PROCEDURE — 99499 UNLISTED E&M SERVICE: CPT | Mod: S$GLB,,, | Performed by: NURSE PRACTITIONER

## 2023-01-05 PROCEDURE — 99999 PR PBB SHADOW E&M-EST. PATIENT-LVL III: ICD-10-PCS | Mod: PBBFAC,,, | Performed by: NURSE PRACTITIONER

## 2023-01-05 PROCEDURE — 0124A COVID-19, MRNA, LNP-S, BIVALENT BOOSTER, PF, 30 MCG/0.3 ML DOSE: CPT | Mod: CV19,PBBFAC | Performed by: INTERNAL MEDICINE

## 2023-01-05 PROCEDURE — 3288F PR FALLS RISK ASSESSMENT DOCUMENTED: ICD-10-PCS | Mod: CPTII,S$GLB,, | Performed by: NURSE PRACTITIONER

## 2023-01-05 PROCEDURE — 99214 OFFICE O/P EST MOD 30 MIN: CPT | Mod: S$GLB,,, | Performed by: NURSE PRACTITIONER

## 2023-01-05 PROCEDURE — 1160F PR REVIEW ALL MEDS BY PRESCRIBER/CLIN PHARMACIST DOCUMENTED: ICD-10-PCS | Mod: CPTII,S$GLB,, | Performed by: NURSE PRACTITIONER

## 2023-01-05 PROCEDURE — 1126F PR PAIN SEVERITY QUANTIFIED, NO PAIN PRESENT: ICD-10-PCS | Mod: CPTII,S$GLB,, | Performed by: NURSE PRACTITIONER

## 2023-01-05 PROCEDURE — 1101F PT FALLS ASSESS-DOCD LE1/YR: CPT | Mod: CPTII,S$GLB,, | Performed by: NURSE PRACTITIONER

## 2023-01-05 PROCEDURE — 1101F PR PT FALLS ASSESS DOC 0-1 FALLS W/OUT INJ PAST YR: ICD-10-PCS | Mod: CPTII,S$GLB,, | Performed by: NURSE PRACTITIONER

## 2023-01-05 PROCEDURE — 3078F DIAST BP <80 MM HG: CPT | Mod: CPTII,S$GLB,, | Performed by: NURSE PRACTITIONER

## 2023-01-05 PROCEDURE — 3075F SYST BP GE 130 - 139MM HG: CPT | Mod: CPTII,S$GLB,, | Performed by: NURSE PRACTITIONER

## 2023-01-05 PROCEDURE — 99999 PR PBB SHADOW E&M-EST. PATIENT-LVL III: CPT | Mod: PBBFAC,,, | Performed by: NURSE PRACTITIONER

## 2023-01-05 PROCEDURE — 3078F PR MOST RECENT DIASTOLIC BLOOD PRESSURE < 80 MM HG: ICD-10-PCS | Mod: CPTII,S$GLB,, | Performed by: NURSE PRACTITIONER

## 2023-01-05 PROCEDURE — 3288F FALL RISK ASSESSMENT DOCD: CPT | Mod: CPTII,S$GLB,, | Performed by: NURSE PRACTITIONER

## 2023-01-05 PROCEDURE — 91312 COVID-19, MRNA, LNP-S, BIVALENT BOOSTER, PF, 30 MCG/0.3 ML DOSE: CPT | Mod: PBBFAC | Performed by: INTERNAL MEDICINE

## 2023-01-05 PROCEDURE — 1159F MED LIST DOCD IN RCRD: CPT | Mod: CPTII,S$GLB,, | Performed by: NURSE PRACTITIONER

## 2023-01-05 PROCEDURE — 99499 RISK ADDL DX/OHS AUDIT: ICD-10-PCS | Mod: S$GLB,,, | Performed by: NURSE PRACTITIONER

## 2023-01-05 PROCEDURE — 1160F RVW MEDS BY RX/DR IN RCRD: CPT | Mod: CPTII,S$GLB,, | Performed by: NURSE PRACTITIONER

## 2023-01-05 PROCEDURE — 99214 PR OFFICE/OUTPT VISIT, EST, LEVL IV, 30-39 MIN: ICD-10-PCS | Mod: S$GLB,,, | Performed by: NURSE PRACTITIONER

## 2023-01-05 PROCEDURE — 3075F PR MOST RECENT SYSTOLIC BLOOD PRESS GE 130-139MM HG: ICD-10-PCS | Mod: CPTII,S$GLB,, | Performed by: NURSE PRACTITIONER

## 2023-01-05 NOTE — ASSESSMENT & PLAN NOTE
-- Risks include +FH, PPI therapy.  -- Reviewed basics of quantity versus quality.  -- Reassuring they are not fracturing.  -- Recommend:  -Pharmacological therapy is recommended for patients with osteopenia if the 10 year probability of a hip fracture is >3% and 10 year probability of other major osteoporotic fractures is >20%.  Treatment options and potential side effects discussed for PO bisphosphonates, reclast, Denosumab, and Teriparatide.   -Treatment:   The patient reports taking Fosamax in the past but states she cannot tolerate due to GI upset     Prolia  Start: 11/2017  Last Dose: 12/12/2022    Therapy plan reordered.    -Calcium and Vitamin D RDD provided.  -Exercise: recommended.  -Fall precautions made in the home.  -Alerted that if dental work needs to be done it should be done prior to initiating therapy. Dental health: UTD.  -- Repeat DEXA scan 10/2023.

## 2023-01-11 ENCOUNTER — CLINICAL SUPPORT (OUTPATIENT)
Dept: REHABILITATION | Facility: HOSPITAL | Age: 78
End: 2023-01-11
Payer: MEDICARE

## 2023-01-11 DIAGNOSIS — M54.50 ACUTE BILATERAL LOW BACK PAIN WITHOUT SCIATICA: Primary | ICD-10-CM

## 2023-01-11 DIAGNOSIS — R29.898 WEAKNESS OF BOTH HIPS: ICD-10-CM

## 2023-01-11 PROCEDURE — 97112 NEUROMUSCULAR REEDUCATION: CPT | Mod: PO,CQ

## 2023-01-11 PROCEDURE — 97110 THERAPEUTIC EXERCISES: CPT | Mod: PO,CQ

## 2023-01-11 NOTE — PROGRESS NOTES
"                            Physical Therapy Daily Treatment Note     Name: Ross Rodriguez  Clinic Number: [id number]    Therapy Diagnosis:   Encounter Diagnoses   Name Primary?    Acute bilateral low back pain without sciatica Yes    Weakness of both hips      Physician: Nahomi Nolasco MD    Visit Date: 1/11/2023    Physician Orders: PT Eval and Treat  Medical Diagnosis from Referral: M54.9 (ICD-10-CM) - Bilateral back pain, unspecified back location, unspecified chronicity  Evaluation Date: 12/19/2022  Authorization Period Expiration: 01/16/2023  Plan of Care Expiration: 3/19/2023  Visit # / Visits authorized: 2/ 20     Time In: 1100  Time Out: 1140  Total Billable Time: 40 minutes     Precautions: Standard    Subjective     Pt reports: the back is feeling a little bit better.   She was compliant with home exercise program. 3x/week    Response to previous treatment: no adverse effect.   Functional change: in progress     Pain:  Current: /10  Location: right low back radiating down to right knee and occasionally to calf      Pts goals: "be able to control pain better"        Objective         Treatment      Therapeutic exercises to develop strength, endurance, ROM, flexibility, posture, and core stabilization for 32 minutes including:    Nustep 10'   RTB glut bridges 3x10  RTB clamshells x15 --- patient had trouble appreciating gluteal muscle activation  Side-lying hip abduction 2x10 B   Standing HR's 2 x 10   Standing hip abd. 2 x 10 B   Standing hip flexion 2 x 10 B   Half kneeling hip flexor stretch 10x5" B        Manual therapy techniques: Joint mobilizations and Soft tissue Mobilization were applied to the: low back for 0 minutes, including:     Right FA joint inferior/lateral glides     Xochitlmaurizio participated in neuromuscular re-education activities to improve: Balance, Coordination, Kinesthetic, Sense, Proprioception, and Posture for 08 minutes, including:    SLS balance     Erikavelvet participated in dynamic " "functional therapeutic activities to improve functional performance for 00  minutes, including:      Home Exercises Provided and Patient Education Provided     Education provided:   - Prognosis, activity modification, goals for therapy, role of therapy for care, exercises/HEP    Written Home Exercises Provided: Patient instructed to cont prior HEP.  Exercises were reviewed and Rosswas able to demonstrate them prior to the end of the session.  Ross demonstrated good  understanding of the education provided.     See EMR under Patient Instructions for exercises provided prior visit.      Assessment      Standing hip exercises were incorporated this morning to continue to work on pt's LE strength and functional mobility.  Pt with no pain post treatment session.  Will continue to monitor and progress pt within her tolerance.     Ross Is progressing well towards her goals.   Pt prognosis is Good.     Pt will continue to benefit from skilled outpatient physical therapy to address the deficits listed in the problem list box on initial evaluation, provide pt/family education and to maximize pt's level of independence in the home and community environment.     Pt's spiritual, cultural and educational needs considered and pt agreeable to plan of care and goals.    Anticipated barriers to physical therapy: none       GOALS:   Short Term Goals:  4 weeks  1.Report decreased low back pain  < / =  4/10 at worst to increase tolerance for HEP/ADLs  2. Increase strength by 1/3 MMT grade in bilateral gluteals/core  to increase tolerance for ADL and work activities.  4. Pt to tolerate HEP to improve ROM and independence with ADL's     Long Term Goals: 12 weeks  1.Report decreased low back pain < / = 2/10 at max to increase tolerance for HEP/ADL/hobbies  2.Patient goal: "be able to control pain better"   3.Increase strength to 4+/5 in  bilateral gluteals/core  to increase tolerance for ADL and work activities.  4. Pt will report " at >90% on FOTO  to demonstrate increase in LE function with every day tasks.       Plan   Plan of care Certification: 12/19/2022 to 3/19/2023.     Outpatient Physical Therapy 1-2 times weekly for 12 weeks to include the following interventions: Aquatic Therapy, Cervical/Lumbar Traction, Electrical Stimulation NMES, Gait Training, Manual Therapy, Moist Heat/ Ice, Neuromuscular Re-ed, Orthotic Management and Training, Patient Education, Self Care, Therapeutic Activities, and Therapeutic Exercise.     Brady Vides, PTA

## 2023-01-18 ENCOUNTER — CLINICAL SUPPORT (OUTPATIENT)
Dept: REHABILITATION | Facility: HOSPITAL | Age: 78
End: 2023-01-18
Payer: MEDICARE

## 2023-01-18 DIAGNOSIS — I77.819 AORTIC ECTASIA: Primary | ICD-10-CM

## 2023-01-18 DIAGNOSIS — I71.21 ANEURYSM OF ASCENDING AORTA WITHOUT RUPTURE: ICD-10-CM

## 2023-01-18 DIAGNOSIS — M54.50 ACUTE BILATERAL LOW BACK PAIN WITHOUT SCIATICA: Primary | ICD-10-CM

## 2023-01-18 DIAGNOSIS — R29.898 WEAKNESS OF BOTH HIPS: ICD-10-CM

## 2023-01-18 PROCEDURE — 97110 THERAPEUTIC EXERCISES: CPT | Mod: PO

## 2023-01-18 PROCEDURE — 97112 NEUROMUSCULAR REEDUCATION: CPT | Mod: PO

## 2023-01-18 NOTE — PROGRESS NOTES
"                            Physical Therapy Daily Treatment Note     Name: Ross Rodriguez  Clinic Number: [id number]    Therapy Diagnosis:   Encounter Diagnoses   Name Primary?    Acute bilateral low back pain without sciatica Yes    Weakness of both hips        Physician: Nahomi Nolasco MD    Visit Date: 1/18/2023    Physician Orders: PT Eval and Treat  Medical Diagnosis from Referral: M54.9 (ICD-10-CM) - Bilateral back pain, unspecified back location, unspecified chronicity  Evaluation Date: 12/19/2022  Authorization Period Expiration: 01/16/2023  Plan of Care Expiration: 3/19/2023  Visit # / Visits authorized: 3/20 (+eval)     Time In: 09 00 am   Time Out: 10 00 am   Total Billable Time: 30 minutes (30 non billable)      Precautions: Standard    Subjective     Pt reports: the back is feeling a little bit better.     She was compliant with home exercise program. 3x/week    Response to previous treatment: no adverse effect.   Functional change: in progress     Pain:  Current: 5/10  Location: right low back radiating down to right knee and occasionally to calf      Pts goals: "be able to control pain better"        Objective       Treatment      Therapeutic exercises to develop strength, endurance, ROM, flexibility, posture, and core stabilization for 30 minutes including:    Nustep 10'   RTB glut bridges 3x10  RTB clamshells x15 --- patient had trouble appreciating gluteal muscle activation  Side-lying hip abduction 2x10 B   Standing HR's 2 x 10   Standing hip abd. 2 x 10 B   Standing hip flexion 2 x 10 B   Half kneeling hip flexor stretch 10x5" B  Supine SL bridge w/o UE support 3x10 ea.   Marching dead bug up/up down down 3x1min  Squats from lowered surface w/hip abd band progressive load BW, 10lb , 15 lb  Monster walk BTB 3x10        Manual therapy techniques: Joint mobilizations and Soft tissue Mobilization were applied to the: low back for 0 minutes, including:     Right FA joint inferior/lateral glides "     Ross participated in neuromuscular re-education activities to improve: Balance, Coordination, Kinesthetic, Sense, Proprioception, and Posture for 15 minutes, including:  Palloff Press 3x10 ea  Paulino UE extension w/alt marching BTB  Dynamic unilateral carry 15lb 3min ea        Ross participated in dynamic functional therapeutic activities to improve functional performance for 00  minutes, including:      Home Exercises Provided and Patient Education Provided     Education provided:   - Prognosis, activity modification, goals for therapy, role of therapy for care, exercises/HEP    Written Home Exercises Provided: Patient instructed to cont prior HEP.  Exercises were reviewed and Rosswas able to demonstrate them prior to the end of the session.  Ross demonstrated good  understanding of the education provided.     See EMR under Patient Instructions for exercises provided prior visit.      Assessment      Continued with posterior chain strengthening and progressed pt to SL positions, pt demonstrated R>L hip weakness with lateral trunk sway compensations and decreased spinal stabilization during functional carries that improved with TC/VC along with improved posture.    Ross Is progressing well towards her goals.   Pt prognosis is Good.     Pt will continue to benefit from skilled outpatient physical therapy to address the deficits listed in the problem list box on initial evaluation, provide pt/family education and to maximize pt's level of independence in the home and community environment.     Pt's spiritual, cultural and educational needs considered and pt agreeable to plan of care and goals.    Anticipated barriers to physical therapy: none       GOALS:   Short Term Goals:  4 weeks  1.Report decreased low back pain  < / =  4/10 at worst to increase tolerance for HEP/ADLs  2. Increase strength by 1/3 MMT grade in bilateral gluteals/core  to increase tolerance for ADL and work activities.  4. Pt to  "tolerate HEP to improve ROM and independence with ADL's     Long Term Goals: 12 weeks  1.Report decreased low back pain < / = 2/10 at max to increase tolerance for HEP/ADL/hobbies  2.Patient goal: "be able to control pain better"   3.Increase strength to 4+/5 in  bilateral gluteals/core  to increase tolerance for ADL and work activities.  4. Pt will report at >90% on FOTO  to demonstrate increase in LE function with every day tasks.       Plan   Plan of care Certification: 12/19/2022 to 3/19/2023.     Outpatient Physical Therapy 1-2 times weekly for 12 weeks to include the following interventions: Aquatic Therapy, Cervical/Lumbar Traction, Electrical Stimulation NMES, Gait Training, Manual Therapy, Moist Heat/ Ice, Neuromuscular Re-ed, Orthotic Management and Training, Patient Education, Self Care, Therapeutic Activities, and Therapeutic Exercise.     Telly Combs, PT          "

## 2023-01-25 ENCOUNTER — CLINICAL SUPPORT (OUTPATIENT)
Dept: REHABILITATION | Facility: HOSPITAL | Age: 78
End: 2023-01-25
Payer: MEDICARE

## 2023-01-25 DIAGNOSIS — M54.50 ACUTE BILATERAL LOW BACK PAIN WITHOUT SCIATICA: Primary | ICD-10-CM

## 2023-01-25 DIAGNOSIS — R29.898 WEAKNESS OF BOTH HIPS: ICD-10-CM

## 2023-01-25 PROCEDURE — 97110 THERAPEUTIC EXERCISES: CPT | Mod: PO

## 2023-01-25 PROCEDURE — 97112 NEUROMUSCULAR REEDUCATION: CPT | Mod: PO

## 2023-01-25 NOTE — PROGRESS NOTES
"                            Physical Therapy Daily Treatment Note     Name: Ross Rodriguez  Clinic Number: [id number]    Therapy Diagnosis:   Encounter Diagnoses   Name Primary?    Acute bilateral low back pain without sciatica Yes    Weakness of both hips        Physician: Nahomi Nolasco MD    Visit Date: 1/25/2023    Physician Orders: PT Eval and Treat  Medical Diagnosis from Referral: M54.9 (ICD-10-CM) - Bilateral back pain, unspecified back location, unspecified chronicity  Evaluation Date: 12/19/2022  Authorization Period Expiration: 01/16/2023  Plan of Care Expiration: 3/19/2023  Visit # / Visits authorized: 4/20 (+eval)     Time In: 1002  Time Out: 1100  Total Billable Time: 30 min (25 not billable)     Precautions: Standard    Subjective     Pt reports: the back is feeling much better, only having a "twinge" in the right low back with certain movements.    She was compliant with home exercise program. 3x/week    Response to previous treatment: no adverse effect.   Functional change: in progress     Pain:  Current: 1/10  Location: right low back      Pts goals: "be able to control pain better"        Objective       Treatment      Therapeutic exercises to develop strength, endurance, ROM, flexibility, posture, and core stabilization for 40 minutes including:    Nustep 10'   Supine SL bridge 3x5 B  RTB clamshells 2x10x5"   Side-lying hip abduction 2x10 B   Standing HR's 2 x 10   Standing hip abd. 2 x 10 B   Standing hip flexion 2 x 10 B   Half kneeling hip flexor stretch 10x5" B  Marching dead bug up/up down down 3x1min  Squats to chair height c RTB knees 3x10   Shuttle DL press 125# 4x10   Monster walk BTB 3x10         Manual therapy techniques: Joint mobilizations and Soft tissue Mobilization were applied to the: low back for 0 minutes, including:     Right FA joint inferior/lateral glides     Ross participated in neuromuscular re-education activities to improve: Balance, Coordination, Kinesthetic, " Sense, Proprioception, and Posture for 15 minutes, including:  Palloff Press 3x10 ea  Paulino UE extension w/alt marching BTB  Dynamic unilateral carry 15lb 3min ea  Lateral Heel taps c UE support 3x8 B         Ross participated in dynamic functional therapeutic activities to improve functional performance for 00  minutes, including:      Home Exercises Provided and Patient Education Provided     Education provided:   - Prognosis, activity modification, goals for therapy, role of therapy for care, exercises/HEP    Written Home Exercises Provided: Patient instructed to cont prior HEP.  Exercises were reviewed and Rosswas able to demonstrate them prior to the end of the session.  Ross demonstrated good  understanding of the education provided.     See EMR under Patient Instructions for exercises provided prior visit.      Assessment      Continued with posterior chain strengthening and progressed challenged dynamic stability with lateral heel taps. Patient requires encouragement to complete sets and increase resistances as tolerated, but is able to carry over cues well. She was adequately fatigued by interventions given today.    Ross Is progressing well towards her goals.   Pt prognosis is Good.     Pt will continue to benefit from skilled outpatient physical therapy to address the deficits listed in the problem list box on initial evaluation, provide pt/family education and to maximize pt's level of independence in the home and community environment.     Pt's spiritual, cultural and educational needs considered and pt agreeable to plan of care and goals.    Anticipated barriers to physical therapy: none       GOALS:   Short Term Goals:  4 weeks  1.Report decreased low back pain  < / =  4/10 at worst to increase tolerance for HEP/ADLs  2. Increase strength by 1/3 MMT grade in bilateral gluteals/core  to increase tolerance for ADL and work activities.  4. Pt to tolerate HEP to improve ROM and independence with  "ADL's     Long Term Goals: 12 weeks  1.Report decreased low back pain < / = 2/10 at max to increase tolerance for HEP/ADL/hobbies  2.Patient goal: "be able to control pain better"   3.Increase strength to 4+/5 in  bilateral gluteals/core  to increase tolerance for ADL and work activities.  4. Pt will report at >90% on FOTO  to demonstrate increase in LE function with every day tasks.       Plan   Plan of care Certification: 12/19/2022 to 3/19/2023.     Outpatient Physical Therapy 1-2 times weekly for 12 weeks to include the following interventions: Aquatic Therapy, Cervical/Lumbar Traction, Electrical Stimulation NMES, Gait Training, Manual Therapy, Moist Heat/ Ice, Neuromuscular Re-ed, Orthotic Management and Training, Patient Education, Self Care, Therapeutic Activities, and Therapeutic Exercise.     Joselito Morelos, PT              "

## 2023-02-01 ENCOUNTER — CLINICAL SUPPORT (OUTPATIENT)
Dept: REHABILITATION | Facility: HOSPITAL | Age: 78
End: 2023-02-01
Payer: MEDICARE

## 2023-02-01 DIAGNOSIS — M54.50 ACUTE BILATERAL LOW BACK PAIN WITHOUT SCIATICA: Primary | ICD-10-CM

## 2023-02-01 DIAGNOSIS — R29.898 WEAKNESS OF BOTH HIPS: ICD-10-CM

## 2023-02-01 PROCEDURE — 97110 THERAPEUTIC EXERCISES: CPT | Mod: PO

## 2023-02-01 PROCEDURE — 97530 THERAPEUTIC ACTIVITIES: CPT | Mod: PO

## 2023-02-01 PROCEDURE — 97112 NEUROMUSCULAR REEDUCATION: CPT | Mod: PO

## 2023-02-01 NOTE — PROGRESS NOTES
"                            Physical Therapy Daily Treatment Note     Name: Ross Rodriguez  Clinic Number: [id number]    Therapy Diagnosis:   Encounter Diagnoses   Name Primary?    Acute bilateral low back pain without sciatica Yes    Weakness of both hips          Physician: Nahomi Nolasco MD    Visit Date: 2/1/2023    Physician Orders: PT Eval and Treat  Medical Diagnosis from Referral: M54.9 (ICD-10-CM) - Bilateral back pain, unspecified back location, unspecified chronicity  Evaluation Date: 12/19/2022  Authorization Period Expiration: 01/16/2023  Plan of Care Expiration: 3/19/2023  Visit # / Visits authorized: 4/20 (+eval)     Time In: 1002  Time Out: 1100  Total Billable Time: 30 min (25 not billable)      Precautions: Standard    Subjective   Patient reports: due to weather back pain is worse today, but not as bad as before.       She was compliant with home exercise program. 3x/week    Response to previous treatment: no adverse effect.   Functional change: in progress     Pain:  Current: 4/10  Location: right low back      Pts goals: "be able to control pain better"        Objective       Treatment      Therapeutic exercises to develop strength, endurance, ROM, flexibility, posture, and core stabilization for 20 minutes including:    Nustep 10'   Supine SL bridge 3x5 B  GTB clamshells 2x5x10"  Side-lying hip abduction 2x10 B   Standing HR's 2 x 10   Standing hip abd. 2 x 10 B   Standing hip flexion 2 x 10 B   Half kneeling hip flexor stretch 10x5" B  Marching dead bug up/up down down 3x1min  Shuttle DL press 125# 4x10   Monster walk BTB 3x10         Manual therapy techniques: Joint mobilizations and Soft tissue Mobilization were applied to the: low back for 0 minutes, including:     Right FA joint inferior/lateral glides       Ross participated in neuromuscular re-education activities to improve: Balance, Coordination, Kinesthetic, Sense, Proprioception, and Posture for 25 minutes, including:  Palloff " Press 3x10 ea  Paulino UE extension w/alt marching BTB  Dynamic unilateral carry 15lb 3min ea  Lateral Heel taps c UE support 3x8 B   Mongolian dead lift 10# KB 3x10   BTB scapular rows 2x20      Ross participated in dynamic functional therapeutic activities to improve functional performance for 10 minutes, including:    Squats GTB knees c yellow med ball 3x10     Home Exercises Provided and Patient Education Provided     Education provided:   - Prognosis, activity modification, goals for therapy, role of therapy for care, exercises/HEP    Written Home Exercises Provided: Patient instructed to cont prior HEP.  Exercises were reviewed and Rosswas able to demonstrate them prior to the end of the session.  Ross demonstrated good  understanding of the education provided.     See EMR under Patient Instructions for exercises provided prior visit.      Assessment      Margarito is asymptomatic with all interventions. She is tolerating progressions well, increasing resistance with interventions. Initiated dead lifts which required cueing but patient demonstrated good carryover.     Ross Is progressing well towards her goals.   Pt prognosis is Good.     Pt will continue to benefit from skilled outpatient physical therapy to address the deficits listed in the problem list box on initial evaluation, provide pt/family education and to maximize pt's level of independence in the home and community environment.     Pt's spiritual, cultural and educational needs considered and pt agreeable to plan of care and goals.    Anticipated barriers to physical therapy: none       GOALS:   Short Term Goals:  4 weeks  1.Report decreased low back pain  < / =  4/10 at worst to increase tolerance for HEP/ADLs  2. Increase strength by 1/3 MMT grade in bilateral gluteals/core  to increase tolerance for ADL and work activities.  4. Pt to tolerate HEP to improve ROM and independence with ADL's     Long Term Goals: 12 weeks  1.Report decreased  "low back pain < / = 2/10 at max to increase tolerance for HEP/ADL/hobbies  2.Patient goal: "be able to control pain better"   3.Increase strength to 4+/5 in  bilateral gluteals/core  to increase tolerance for ADL and work activities.  4. Pt will report at >90% on FOTO  to demonstrate increase in LE function with every day tasks.       Plan   Plan of care Certification: 12/19/2022 to 3/19/2023.     Outpatient Physical Therapy 1-2 times weekly for 12 weeks to include the following interventions: Aquatic Therapy, Cervical/Lumbar Traction, Electrical Stimulation NMES, Gait Training, Manual Therapy, Moist Heat/ Ice, Neuromuscular Re-ed, Orthotic Management and Training, Patient Education, Self Care, Therapeutic Activities, and Therapeutic Exercise.     Joselito Morelos, PT                "

## 2023-03-02 ENCOUNTER — PES CALL (OUTPATIENT)
Dept: ADMINISTRATIVE | Facility: CLINIC | Age: 78
End: 2023-03-02
Payer: MEDICARE

## 2023-03-13 ENCOUNTER — PATIENT MESSAGE (OUTPATIENT)
Dept: INTERNAL MEDICINE | Facility: CLINIC | Age: 78
End: 2023-03-13
Payer: MEDICARE

## 2023-03-14 ENCOUNTER — OFFICE VISIT (OUTPATIENT)
Dept: CARDIOTHORACIC SURGERY | Facility: CLINIC | Age: 78
End: 2023-03-14
Payer: MEDICARE

## 2023-03-14 ENCOUNTER — HOSPITAL ENCOUNTER (OUTPATIENT)
Dept: RADIOLOGY | Facility: HOSPITAL | Age: 78
Discharge: HOME | End: 2023-03-14
Attending: THORACIC SURGERY (CARDIOTHORACIC VASCULAR SURGERY)
Payer: MEDICARE

## 2023-03-14 VITALS
SYSTOLIC BLOOD PRESSURE: 141 MMHG | HEIGHT: 61 IN | WEIGHT: 160.94 LBS | HEART RATE: 69 BPM | BODY MASS INDEX: 30.39 KG/M2 | OXYGEN SATURATION: 98 % | DIASTOLIC BLOOD PRESSURE: 65 MMHG

## 2023-03-14 DIAGNOSIS — I71.21 ANEURYSM OF ASCENDING AORTA WITHOUT RUPTURE: ICD-10-CM

## 2023-03-14 DIAGNOSIS — I77.819 AORTIC ECTASIA: ICD-10-CM

## 2023-03-14 PROBLEM — I71.20 THORACIC AORTIC ANEURYSM (TAA): Status: ACTIVE | Noted: 2023-03-14

## 2023-03-14 PROCEDURE — 3288F FALL RISK ASSESSMENT DOCD: CPT | Mod: CPTII,S$GLB,, | Performed by: THORACIC SURGERY (CARDIOTHORACIC VASCULAR SURGERY)

## 2023-03-14 PROCEDURE — 3077F PR MOST RECENT SYSTOLIC BLOOD PRESSURE >= 140 MM HG: ICD-10-PCS | Mod: CPTII,S$GLB,, | Performed by: THORACIC SURGERY (CARDIOTHORACIC VASCULAR SURGERY)

## 2023-03-14 PROCEDURE — 71250 CT THORAX DX C-: CPT | Mod: TC

## 2023-03-14 PROCEDURE — 1159F PR MEDICATION LIST DOCUMENTED IN MEDICAL RECORD: ICD-10-PCS | Mod: CPTII,S$GLB,, | Performed by: THORACIC SURGERY (CARDIOTHORACIC VASCULAR SURGERY)

## 2023-03-14 PROCEDURE — 1101F PT FALLS ASSESS-DOCD LE1/YR: CPT | Mod: CPTII,S$GLB,, | Performed by: THORACIC SURGERY (CARDIOTHORACIC VASCULAR SURGERY)

## 2023-03-14 PROCEDURE — 1126F AMNT PAIN NOTED NONE PRSNT: CPT | Mod: CPTII,S$GLB,, | Performed by: THORACIC SURGERY (CARDIOTHORACIC VASCULAR SURGERY)

## 2023-03-14 PROCEDURE — 99215 PR OFFICE/OUTPT VISIT, EST, LEVL V, 40-54 MIN: ICD-10-PCS | Mod: S$GLB,,, | Performed by: THORACIC SURGERY (CARDIOTHORACIC VASCULAR SURGERY)

## 2023-03-14 PROCEDURE — 71250 CT THORAX DX C-: CPT | Mod: 26,,, | Performed by: RADIOLOGY

## 2023-03-14 PROCEDURE — 1126F PR PAIN SEVERITY QUANTIFIED, NO PAIN PRESENT: ICD-10-PCS | Mod: CPTII,S$GLB,, | Performed by: THORACIC SURGERY (CARDIOTHORACIC VASCULAR SURGERY)

## 2023-03-14 PROCEDURE — 99215 OFFICE O/P EST HI 40 MIN: CPT | Mod: S$GLB,,, | Performed by: THORACIC SURGERY (CARDIOTHORACIC VASCULAR SURGERY)

## 2023-03-14 PROCEDURE — 3078F DIAST BP <80 MM HG: CPT | Mod: CPTII,S$GLB,, | Performed by: THORACIC SURGERY (CARDIOTHORACIC VASCULAR SURGERY)

## 2023-03-14 PROCEDURE — 3288F PR FALLS RISK ASSESSMENT DOCUMENTED: ICD-10-PCS | Mod: CPTII,S$GLB,, | Performed by: THORACIC SURGERY (CARDIOTHORACIC VASCULAR SURGERY)

## 2023-03-14 PROCEDURE — 3077F SYST BP >= 140 MM HG: CPT | Mod: CPTII,S$GLB,, | Performed by: THORACIC SURGERY (CARDIOTHORACIC VASCULAR SURGERY)

## 2023-03-14 PROCEDURE — 3078F PR MOST RECENT DIASTOLIC BLOOD PRESSURE < 80 MM HG: ICD-10-PCS | Mod: CPTII,S$GLB,, | Performed by: THORACIC SURGERY (CARDIOTHORACIC VASCULAR SURGERY)

## 2023-03-14 PROCEDURE — 71250 CT CHEST WITHOUT CONTRAST: ICD-10-PCS | Mod: 26,,, | Performed by: RADIOLOGY

## 2023-03-14 PROCEDURE — 99999 PR PBB SHADOW E&M-EST. PATIENT-LVL III: ICD-10-PCS | Mod: PBBFAC,,, | Performed by: THORACIC SURGERY (CARDIOTHORACIC VASCULAR SURGERY)

## 2023-03-14 PROCEDURE — 1101F PR PT FALLS ASSESS DOC 0-1 FALLS W/OUT INJ PAST YR: ICD-10-PCS | Mod: CPTII,S$GLB,, | Performed by: THORACIC SURGERY (CARDIOTHORACIC VASCULAR SURGERY)

## 2023-03-14 PROCEDURE — 1159F MED LIST DOCD IN RCRD: CPT | Mod: CPTII,S$GLB,, | Performed by: THORACIC SURGERY (CARDIOTHORACIC VASCULAR SURGERY)

## 2023-03-14 PROCEDURE — 99999 PR PBB SHADOW E&M-EST. PATIENT-LVL III: CPT | Mod: PBBFAC,,, | Performed by: THORACIC SURGERY (CARDIOTHORACIC VASCULAR SURGERY)

## 2023-03-14 NOTE — PROGRESS NOTES
Subjective:      Patient ID: Ross Rodriguez is a 77 y.o. female.    Chief Complaint: No chief complaint on file.      HPI:  Ross Rodriguez is a 77 y.o. female who presents as a follow up for TAA.  She has a medial history significant for hyperlipidemia, known TAA, and GERD. During her last clinic visit, she was noted to have an increasing ascending aortic aneurysm measuring 4.9 cm.  Today, she denies symptoms except for leg cramping which she attributes to her statin medication.  She denies chest pain, shortness of breath, and back pain.    Current medications Reviewed    Review of Systems   Constitutional:  Negative for activity change, appetite change, fatigue and fever.   HENT:  Negative for nosebleeds.    Respiratory:  Negative for cough and shortness of breath.    Cardiovascular:  Negative for chest pain, palpitations and leg swelling.   Gastrointestinal:  Negative for abdominal distention, abdominal pain and nausea.   Genitourinary:  Negative for frequency.   Musculoskeletal:  Negative for arthralgias and myalgias.   Skin:  Negative for rash.   Neurological:  Negative for dizziness and numbness.   Hematological:  Does not bruise/bleed easily.   Objective:   Physical Exam  HENT:      Head: Normocephalic and atraumatic.   Eyes:      Extraocular Movements: Extraocular movements intact.   Cardiovascular:      Rate and Rhythm: Normal rate and regular rhythm.   Pulmonary:      Effort: Pulmonary effort is normal.   Abdominal:      General: Abdomen is flat.      Palpations: Abdomen is soft.   Musculoskeletal:         General: Normal range of motion.      Cervical back: Normal range of motion.   Skin:     General: Skin is warm and dry.      Capillary Refill: Capillary refill takes less than 2 seconds.   Neurological:      General: No focal deficit present.       Diagnotic Results:  CT SCAN:  Independently reviewed     CT SCAN:  Impression:  1.  No significant interval change.  Fusiform aneurysmal dilatation of the  ascending and arch of the aorta with measurements that appears stable as above.  2.  Mild mid-lower lung predominant, subpleural reticular opacities.  Consistent with mild fibrosis.  3.  Other stable findings as described above  Assessment:   TAA  Plan:     CTS Attending Note:    I have personally taken the history and examined this patient and agree with the NISH's note as stated above.  Very pleasant 77-year-old woman who has been followed for many years for ascending aortic aneurysm.  She had a repeat CT scan today.  The official report is not yet available, but by my measurement her ascending aorta remains roughly 4.8 cm in diameter at the level of the right pulmonary artery which is stable.  I will plan to see her back in 6 months with a repeat CT scan.

## 2023-03-14 NOTE — LETTER
March 14, 2023        Nahomi Nolasco MD  [address]  [facility] [address]             [facility] 2nd Fl  [address]  [facility] [address]  Phone: [phone]   Patient: Ross Rodriguez   MR Number: [id number]   YOB: 1945   Date of Visit: 3/14/2023       Dear Dr. Nolasco:    Thank you for referring Ross Rodriguez to me for evaluation. Below are the relevant portions of my assessment and plan of care.            If you have questions, please do not hesitate to call me. I look forward to following Ross along with you.    Sincerely,      Armando Mcdaniel MD           CC  No Recipients

## 2023-04-06 ENCOUNTER — TELEPHONE (OUTPATIENT)
Dept: INTERNAL MEDICINE | Facility: CLINIC | Age: 78
End: 2023-04-06
Payer: MEDICARE

## 2023-04-06 NOTE — TELEPHONE ENCOUNTER
----- Message from Soham Reese sent at 4/4/2023  9:29 AM CDT -----  Name Of Caller: Ross        Provider Name: Nahomi Nolasco        Does patient feel the need to be seen today? no        Relationship to the Pt?: patient        Contact Preference?: [phone] or [phone]        What is the nature of the call?: Patient states that she dropped off to the office on 3- a medical release form for her to go on a trip with [employer] and states that she was told that she get a call once the form was completed and she has not received that call yet.

## 2023-04-25 ENCOUNTER — TELEPHONE (OUTPATIENT)
Dept: INTERNAL MEDICINE | Facility: CLINIC | Age: 78
End: 2023-04-25

## 2023-04-25 ENCOUNTER — OFFICE VISIT (OUTPATIENT)
Dept: INTERNAL MEDICINE | Facility: CLINIC | Age: 78
End: 2023-04-25
Payer: MEDICARE

## 2023-04-25 VITALS
BODY MASS INDEX: 30.85 KG/M2 | RESPIRATION RATE: 16 BRPM | WEIGHT: 163.38 LBS | SYSTOLIC BLOOD PRESSURE: 132 MMHG | HEART RATE: 70 BPM | DIASTOLIC BLOOD PRESSURE: 58 MMHG | HEIGHT: 61 IN

## 2023-04-25 DIAGNOSIS — R29.898 WEAKNESS OF BOTH HIPS: ICD-10-CM

## 2023-04-25 DIAGNOSIS — E78.2 MIXED HYPERLIPIDEMIA: ICD-10-CM

## 2023-04-25 DIAGNOSIS — M25.559 HIP PAIN, UNSPECIFIED LATERALITY: ICD-10-CM

## 2023-04-25 DIAGNOSIS — F51.01 PRIMARY INSOMNIA: ICD-10-CM

## 2023-04-25 DIAGNOSIS — M25.511 CHRONIC PAIN OF BOTH SHOULDERS: ICD-10-CM

## 2023-04-25 DIAGNOSIS — K57.50 DIVERTICULOSIS OF BOTH SMALL AND LARGE INTESTINE WITHOUT BLEEDING: ICD-10-CM

## 2023-04-25 DIAGNOSIS — I77.819 AORTIC ECTASIA: ICD-10-CM

## 2023-04-25 DIAGNOSIS — G89.29 CHRONIC PAIN OF BOTH SHOULDERS: ICD-10-CM

## 2023-04-25 DIAGNOSIS — J98.4 LUNG CYST: ICD-10-CM

## 2023-04-25 DIAGNOSIS — M54.9 BILATERAL BACK PAIN, UNSPECIFIED BACK LOCATION, UNSPECIFIED CHRONICITY: Primary | ICD-10-CM

## 2023-04-25 DIAGNOSIS — M54.50 ACUTE BILATERAL LOW BACK PAIN WITHOUT SCIATICA: ICD-10-CM

## 2023-04-25 DIAGNOSIS — Z00.00 ENCOUNTER FOR PREVENTIVE HEALTH EXAMINATION: Primary | ICD-10-CM

## 2023-04-25 DIAGNOSIS — J84.9 ILD (INTERSTITIAL LUNG DISEASE): ICD-10-CM

## 2023-04-25 DIAGNOSIS — M25.512 CHRONIC PAIN OF BOTH SHOULDERS: ICD-10-CM

## 2023-04-25 DIAGNOSIS — M81.0 AGE-RELATED OSTEOPOROSIS WITHOUT CURRENT PATHOLOGICAL FRACTURE: ICD-10-CM

## 2023-04-25 DIAGNOSIS — I70.0 AORTIC ATHEROSCLEROSIS: ICD-10-CM

## 2023-04-25 DIAGNOSIS — K21.9 GASTROESOPHAGEAL REFLUX DISEASE WITHOUT ESOPHAGITIS: ICD-10-CM

## 2023-04-25 DIAGNOSIS — I71.21 ANEURYSM OF ASCENDING AORTA WITHOUT RUPTURE: ICD-10-CM

## 2023-04-25 DIAGNOSIS — I27.20 PULMONARY HYPERTENSION, UNSPECIFIED: ICD-10-CM

## 2023-04-25 PROCEDURE — 3288F FALL RISK ASSESSMENT DOCD: CPT | Mod: CPTII,S$GLB,, | Performed by: NURSE PRACTITIONER

## 2023-04-25 PROCEDURE — 99999 PR PBB SHADOW E&M-EST. PATIENT-LVL IV: ICD-10-PCS | Mod: PBBFAC,,, | Performed by: NURSE PRACTITIONER

## 2023-04-25 PROCEDURE — 3078F PR MOST RECENT DIASTOLIC BLOOD PRESSURE < 80 MM HG: ICD-10-PCS | Mod: CPTII,S$GLB,, | Performed by: NURSE PRACTITIONER

## 2023-04-25 PROCEDURE — 1101F PR PT FALLS ASSESS DOC 0-1 FALLS W/OUT INJ PAST YR: ICD-10-PCS | Mod: CPTII,S$GLB,, | Performed by: NURSE PRACTITIONER

## 2023-04-25 PROCEDURE — 1101F PT FALLS ASSESS-DOCD LE1/YR: CPT | Mod: CPTII,S$GLB,, | Performed by: NURSE PRACTITIONER

## 2023-04-25 PROCEDURE — 3075F PR MOST RECENT SYSTOLIC BLOOD PRESS GE 130-139MM HG: ICD-10-PCS | Mod: CPTII,S$GLB,, | Performed by: NURSE PRACTITIONER

## 2023-04-25 PROCEDURE — 1125F AMNT PAIN NOTED PAIN PRSNT: CPT | Mod: CPTII,S$GLB,, | Performed by: NURSE PRACTITIONER

## 2023-04-25 PROCEDURE — 1159F PR MEDICATION LIST DOCUMENTED IN MEDICAL RECORD: ICD-10-PCS | Mod: CPTII,S$GLB,, | Performed by: NURSE PRACTITIONER

## 2023-04-25 PROCEDURE — 3288F PR FALLS RISK ASSESSMENT DOCUMENTED: ICD-10-PCS | Mod: CPTII,S$GLB,, | Performed by: NURSE PRACTITIONER

## 2023-04-25 PROCEDURE — 1170F FXNL STATUS ASSESSED: CPT | Mod: CPTII,S$GLB,, | Performed by: NURSE PRACTITIONER

## 2023-04-25 PROCEDURE — 3075F SYST BP GE 130 - 139MM HG: CPT | Mod: CPTII,S$GLB,, | Performed by: NURSE PRACTITIONER

## 2023-04-25 PROCEDURE — 3078F DIAST BP <80 MM HG: CPT | Mod: CPTII,S$GLB,, | Performed by: NURSE PRACTITIONER

## 2023-04-25 PROCEDURE — G0439 PPPS, SUBSEQ VISIT: HCPCS | Mod: S$GLB,,, | Performed by: NURSE PRACTITIONER

## 2023-04-25 PROCEDURE — 1160F RVW MEDS BY RX/DR IN RCRD: CPT | Mod: CPTII,S$GLB,, | Performed by: NURSE PRACTITIONER

## 2023-04-25 PROCEDURE — G0439 PR MEDICARE ANNUAL WELLNESS SUBSEQUENT VISIT: ICD-10-PCS | Mod: S$GLB,,, | Performed by: NURSE PRACTITIONER

## 2023-04-25 PROCEDURE — 1160F PR REVIEW ALL MEDS BY PRESCRIBER/CLIN PHARMACIST DOCUMENTED: ICD-10-PCS | Mod: CPTII,S$GLB,, | Performed by: NURSE PRACTITIONER

## 2023-04-25 PROCEDURE — 99999 PR PBB SHADOW E&M-EST. PATIENT-LVL IV: CPT | Mod: PBBFAC,,, | Performed by: NURSE PRACTITIONER

## 2023-04-25 PROCEDURE — 1125F PR PAIN SEVERITY QUANTIFIED, PAIN PRESENT: ICD-10-PCS | Mod: CPTII,S$GLB,, | Performed by: NURSE PRACTITIONER

## 2023-04-25 PROCEDURE — 1159F MED LIST DOCD IN RCRD: CPT | Mod: CPTII,S$GLB,, | Performed by: NURSE PRACTITIONER

## 2023-04-25 PROCEDURE — 1170F PR FUNCTIONAL STATUS ASSESSED: ICD-10-PCS | Mod: CPTII,S$GLB,, | Performed by: NURSE PRACTITIONER

## 2023-04-25 NOTE — PROGRESS NOTES
"Ross Rodriguez presented for a  Medicare AWV and comprehensive Health Risk Assessment today. The following components were reviewed and updated:    Medical history  Family History  Social history  Allergies and Current Medications  Health Risk Assessment  Health Maintenance  Care Team         ** See Completed Assessments for Annual Wellness Visit within the encounter summary.**         The following assessments were completed:  Living Situation  CAGE  Depression Screening  Timed Get Up and Go  Whisper Test  Cognitive Function Screening    Nutrition Screening  ADL Screening  PAQ Screening  Review for Opioid Screening: Pt does not have Rx for Opioids.  Review for Substance Use Disorders: Patient does not use substances.        Vitals:    04/25/23 1026   BP: (!) 132/58   BP Location: Right arm   Patient Position: Sitting   BP Method: Large (Manual)   Pulse: 70   Resp: 16   Weight: 74.1 kg (163 lb 5.8 oz)   Height: 5' 1" (1.549 m)     Body mass index is 30.87 kg/m².    Physical Exam  Vitals reviewed.   Constitutional:       Appearance: Normal appearance. She is obese.   HENT:      Head: Normocephalic.   Cardiovascular:      Rate and Rhythm: Normal rate.   Pulmonary:      Effort: Pulmonary effort is normal.   Abdominal:      General: Bowel sounds are normal.   Musculoskeletal:      Right lower leg: No edema.      Left lower leg: No edema.      Comments: Antalgic gait.   Skin:     General: Skin is warm and dry.      Capillary Refill: Capillary refill takes less than 2 seconds.   Neurological:      Mental Status: She is alert and oriented to person, place, and time.   Psychiatric:         Behavior: Behavior normal.         Thought Content: Thought content normal.         Judgment: Judgment normal.             Diagnoses and health risks identified today and associated recommendations/orders:    1. Encounter for preventive health examination  Assessments completed.  HM recommendations reviewed.  F/u with PCP as " instructed.    2. ILD (interstitial lung disease): see CT 2019, also 2020; stable 8/21; stable 9/22  Chronic, stable on current regimen. Followed by PCP.    3. Aortic atherosclerosis: see CT scan 7/18; stable 8/20; stable 8/21  Chronic, stable on current regimen. Followed by PCP.    4. Aneurysm of ascending aorta without rupture  Chronic, stable on current regimen. Followed by cardiothoracic surgery.     5. Aortic ectasia: ascending aorta 4.7 cm 2016; stable 2020 follows in CTS; enlarged to 4.9 8/21; stable 9/22  Chronic, stable on current regimen. Followed by cardiothoracic surgery.    6. Pulmonary hypertension, unspecified: see ECHO 2019  Chronic, stable on current regimen. Followed by PCP.    7. Lung cyst: R side see CT 2009  Chronic, stable on current regimen. Followed by PCP.    8. Mixed hyperlipidemia  Chronic, stable on current regimen. Followed by PCP.    9. BMI 30.0-30.9,adult  Chronic, stable on current regimen. Followed by PCP.    10. Gastroesophageal reflux disease without esophagitis  Chronic, stable on current regimen. Followed by PCP.    11. Diverticulosis of small and large intestine see CT scan 7/15  Chronic, stable on current regimen. Followed by PCP.    12. Age-related osteoporosis; see DEXA 2019 also 2021  Chronic, stable on current regimen. Followed by endocrinology.    13. Acute bilateral low back pain without sciatica  Chronic, stable on current regimen. Followed by PCP. Just completed PT, doing exercises, not seeing improvement in pain.    14. Weakness of both hips  Chronic, stable on current regimen. Followed by PCP. Just completed PT, doing exercises, not seeing improvement in pain.    15. Chronic pain of both shoulders  Chronic, stable on current regimen. Followed by PCP.    16. Primary insomnia  Chronic, stable on current regimen. Followed by PCP.       Provided Joyclyn with a 5-10 year written screening schedule and personal prevention plan. Recommendations were developed using the  USPSTF age appropriate recommendations. Education, counseling, and referrals were provided as needed. After Visit Summary printed and given to patient which includes a list of additional screenings\tests needed.    Follow up in about 1 year (around 4/25/2024) for Medicare AWV and with PCP as scheduled.       Milagro Roy NP    I offered to discuss advanced care planning, including how to pick a person who would make decisions for you if you were unable to make them for yourself, called a health care power of , and what kind of decisions you might make such as use of life sustaining treatments such as ventilators and tube feeding when faced with a life limiting illness recorded on a living will that they will need to know. (How you want to be cared for as you near the end of your natural life)     X Patient is interested in learning more about how to make advanced directives.  I provided them paperwork and offered to discuss this with them.   Patient

## 2023-04-25 NOTE — TELEPHONE ENCOUNTER
Okay to send her my [facility] message telling her that I understand she is still having pain despite physical therapy.  Next step would be the [facility].  The main issue we found was degenerative change in the back.    So to start in the Spine Clinic and then we could consider orthopedics for her hip pain as well, if she wants the referrals, they are already in, please let her know by my [facility]

## 2023-04-25 NOTE — TELEPHONE ENCOUNTER
----- Message from Milagro Roy NP sent at 4/25/2023 10:49 AM CDT -----  Patient seen today for HRA. C/o continued pain after completing physical therapy and doing exercises 5x a week. She is asking what the next step is for right hip/back pain. She would prefer to be messaged through the patient portal.    Thanks,  Milagro Roy NP  Internal Medicine

## 2023-04-25 NOTE — PATIENT INSTRUCTIONS
1. Follow up with Dr. Nahomi Nolasco MD as scheduled. I will message Dr. Nolasco's staff about your pain.    2. Try adding probiotic regularly. Can also try to add fiber supplement to bulk up stools. I like benefiber (store brand fine) add to warm liquid, start with 1-2 tbsps adjust as needed.    Counseling and Referral of Other Preventative  (Italic type indicates deductible and co-insurance are waived)    Patient Name: Ross Rodriguez  Today's Date: 4/25/2023    Health Maintenance         Date Due Completion Date    DEXA Scan 10/01/2023 10/1/2021    Override on 1/24/2012: Done    Hemoglobin A1c (Prediabetes) 11/04/2023 11/4/2022    TETANUS VACCINE 06/21/2026 6/21/2016    Lipid Panel 11/04/2027 11/4/2022          No orders of the defined types were placed in this encounter.    The following information is provided to all patients.  This information is to help you find resources for any of the problems found today that may be affecting your health:                Living healthy guide: www.[facility].[place].gov      Understanding Diabetes: www.diabetes.org      Eating healthy: www.cdc.gov/healthyweight      [facility] home safety checklist: www.cdc.gov/steadi/patient.html      Agency on Aging: www.goea.[place].gov      Alcoholics anonymous (AA): www.aa.org      Physical Activity: www.lonnie.nih.gov/pj0ruct      Tobacco use: www.quitwithusla.org

## 2023-05-03 NOTE — PROGRESS NOTES
DATE: 5/17/2023  PATIENT: Ross Rodriguez    Supervising Physician: Orestes Flores M.D.    CHIEF COMPLAINT: back pain    HISTORY:  Ross Rodriguez is a 77 y.o. female with pmhx of osteoporosis here for initial evaluation of low back and intermittent right leg pain (Back - 6, Leg - 2). The pain has been present for years. The patient describes the pain as aching.  The pain is worse with bending forward and improved by massage and her TENs unit. There is no associated numbness and tingling. There is no subjective weakness. Prior treatments have included Tylenol and PT, but no DONNIE or surgery.    The patient denies myelopathic symptoms such as handwriting changes or difficulty with buttons/coins/keys. Denies perineal paresthesias, bowel/bladder dysfunction.    PAST MEDICAL/SURGICAL HISTORY:  Past Medical History:   Diagnosis Date    Allergy     Anxiety     Aortic atherosclerosis: see CT scan 7/18 8/21/2018    Arthritis     Cataract     Depression     Diverticulosis 7/21/2015    GERD (gastroesophageal reflux disease)     Hyperlipidemia     Joint pain     Liver cyst: see CT 2009 6/21/2016    Lower back pain 7/29/2019    Lung cyst: R side see CT 2009 6/21/2016    Nuclear sclerosis - Both Eyes 2/14/2014    Osteoporosis     Primary insomnia 6/21/2016    Primary insomnia 6/21/2016     Past Surgical History:   Procedure Laterality Date    CHOLECYSTECTOMY      DILATION AND CURETTAGE OF UTERUS      WISDOM TOOTH EXTRACTION         Current Medications:   Current Outpatient Medications:     acetaminophen (TYLENOL) 500 mg Cap, , Disp: , Rfl:     calcium citrate-vitamin D2 1,500-200 mg-unit Tab, , Disp: , Rfl:     clotrimazole-betamethasone 1-0.05% (LOTRISONE) cream, Apply to affected area 2 times daily, Disp: 45 g, Rfl: 1    denosumab (PROLIA) 60 mg/mL Syrg, Inject 60 mg into the skin every 6 (six) months., Disp: , Rfl:     fish oil-omega-3 fatty acids 300-1,000 mg capsule, Take 2 g by mouth once daily. Takes 2 times daily, Disp: ,  Rfl:     FLAXSEED ORAL, Take 1 application by mouth once daily at 6am. Add seeds to oatmeal, Disp: , Rfl:     hydrOXYzine HCL (ATARAX) 25 MG tablet, Take 1 tablet (25 mg total) by mouth daily as needed for Anxiety., Disp: 90 tablet, Rfl: 1    metoprolol succinate (TOPROL-XL) 25 MG 24 hr tablet, Take 1 tablet (25 mg total) by mouth once daily., Disp: 90 tablet, Rfl: 3    multivitamin (THERAGRAN) per tablet, Take 1 tablet by mouth once daily., Disp: , Rfl:     rosuvastatin (CRESTOR) 10 MG tablet, Take 1 tablet (10 mg total) by mouth once daily., Disp: 90 tablet, Rfl: 3    gabapentin (NEURONTIN) 300 MG capsule, Take 1 capsule (300 mg total) by mouth every evening., Disp: 30 capsule, Rfl: 11    LORazepam (ATIVAN) 0.5 MG tablet, Take 1 tablet (0.5 mg total) by mouth as needed for Anxiety (May take 1-2 x weekly for anxiety or for travel). (Patient not taking: Reported on 4/25/2023), Disp: 30 tablet, Rfl: 0    methocarbamoL (ROBAXIN) 500 MG Tab, Take 1 tablet (500 mg total) by mouth 4 (four) times daily as needed (muscle spasms)., Disp: 40 tablet, Rfl: 4    Social History:   Social History     Socioeconomic History    Marital status:     Number of children: 1   Occupational History    Occupation: retired    Tobacco Use    Smoking status: Never    Smokeless tobacco: Never   Substance and Sexual Activity    Alcohol use: Yes     Comment: occasional, 1 drink every 3 months    Drug use: No    Sexual activity: Not Currently     Birth control/protection: Post-menopausal     Social Determinants of Health     Financial Resource Strain: Low Risk     Difficulty of Paying Living Expenses: Not hard at all   Food Insecurity: No Food Insecurity    Worried About Running Out of Food in the Last Year: Never true    Ran Out of Food in the Last Year: Never true   Transportation Needs: No Transportation Needs    Lack of Transportation (Medical): No    Lack of Transportation (Non-Medical): No   Physical Activity: Insufficiently Active  "   Days of Exercise per Week: 3 days    Minutes of Exercise per Session: 20 min   Stress: Stress Concern Present    Feeling of Stress : To some extent   Social Connections: Moderately Integrated    Frequency of Communication with Friends and Family: More than three times a week    Frequency of Social Gatherings with Friends and Family: Once a week    Attends Nondenominational Services: More than 4 times per year    Active Member of Clubs or Organizations: Yes    Attends Club or Organization Meetings: More than 4 times per year    Marital Status:    Housing Stability: Low Risk     Unable to Pay for Housing in the Last Year: No    Number of Places Lived in the Last Year: 1    Unstable Housing in the Last Year: No       REVIEW OF SYSTEMS:  Constitution: Negative. Negative for chills, fever and night sweats.   Cardiovascular: Negative for chest pain and syncope.   Respiratory: Negative for cough and shortness of breath.   Gastrointestinal: See HPI. Negative for nausea/vomiting. Negative for abdominal pain.  Genitourinary: See HPI. Negative for discoloration or dysuria.  Skin: Negative for dry skin, itching and rash.   Hematologic/Lymphatic: Negative for bleeding problem. Does not bruise/bleed easily.   Musculoskeletal: Negative for falls and muscle weakness.   Neurological: See HPI. No seizures.   Endocrine: Negative for polydipsia, polyphagia and polyuria.   Allergic/Immunologic: Negative for hives and persistent infections.    PHYSICAL EXAMINATION:    Ht 5' 1" (1.549 m)   Wt 73.9 kg (163 lb 0.5 oz)   BMI 30.80 kg/m²     General: The patient is a very pleasant 77 y.o. female in no apparent distress, the patient is oriented to person, place and time.   Psych: Normal mood and affect  HEENT: Vision grossly intact, hearing intact to the spoken word.  Lungs: Respirations unlabored.  Gait: Normal station and gait, no difficulty with toe or heel walk.   Skin: Dorsal lumbar skin negative for rashes, lesions, hairy patches " and surgical scars.  Range of motion: Lumbar range of motion is acceptable. There is mild lumbar tenderness to palpation.  Spinal Balance: Global saggital and coronal spinal balance acceptable, no significant for scoliosis and kyphosis.  Musculoskeletal: No pain with the range of motion of the bilateral hips. No trochanteric tenderness to palpation.  Vascular: Bilateral lower extremities warm and well perfused, Dorsalis pedis pulses 2+ bilaterally.  Neurological: Normal strength and tone in all major motor groups in the bilateral lower extremities. Normal sensation to light touch in the L2-S1 dermatomes bilaterally.  Deep tendon reflexes symmetric 2+ in the bilateral lower extremities.  Negative Babinski bilaterally.  Straight leg raise negative bilaterally.     IMAGING:   Today I personally reviewed AP, Lat and Flex/Ex upright L-spine films that demonstrate mild DDD at L3/4.      Body mass index is 30.8 kg/m².    Hemoglobin A1C   Date Value Ref Range Status   11/04/2022 5.5 4.0 - 5.6 % Final     Comment:     ADA Screening Guidelines:  5.7-6.4%  Consistent with prediabetes  >or=6.5%  Consistent with diabetes    High levels of fetal hemoglobin interfere with the HbA1C  assay. Heterozygous hemoglobin variants (HbS, HgC, etc)do  not significantly interfere with this assay.   However, presence of multiple variants may affect accuracy.     09/28/2021 5.4 4.0 - 5.6 % Final     Comment:     ADA Screening Guidelines:  5.7-6.4%  Consistent with prediabetes  >or=6.5%  Consistent with diabetes    High levels of fetal hemoglobin interfere with the HbA1C  assay. Heterozygous hemoglobin variants (HbS, HgC, etc)do  not significantly interfere with this assay.   However, presence of multiple variants may affect accuracy.     08/14/2020 5.4 4.0 - 5.6 % Final     Comment:     ADA Screening Guidelines:  5.7-6.4%  Consistent with prediabetes  >or=6.5%  Consistent with diabetes  High levels of fetal hemoglobin interfere with the  HbA1C  assay. Heterozygous hemoglobin variants (HbS, HgC, etc)do  not significantly interfere with this assay.   However, presence of multiple variants may affect accuracy.           ASSESSMENT/PLAN:    Ross was seen today for low-back pain.    Diagnoses and all orders for this visit:    Dorsalgia, unspecified  -     MRI Lumbar Spine Without Contrast; Future    Chronic bilateral thoracic back pain  -     Ambulatory referral/consult to [facility]  -     MRI Thoracic Spine Without Contrast; Future  -     gabapentin (NEURONTIN) 300 MG capsule; Take 1 capsule (300 mg total) by mouth every evening.  -     methocarbamoL (ROBAXIN) 500 MG Tab; Take 1 tablet (500 mg total) by mouth 4 (four) times daily as needed (muscle spasms).    Lumbar radiculopathy  -     MRI Lumbar Spine Without Contrast; Future  -     gabapentin (NEURONTIN) 300 MG capsule; Take 1 capsule (300 mg total) by mouth every evening.      Today we discussed at length all of the different treatment options including anti-inflammatories, acetaminophen, rest, ice, heat, physical therapy including strengthening and stretching exercises, home exercises, ROM, aerobic conditioning, aqua therapy, other modalities including ultrasound, massage, and dry needling, epidural steroid injections and finally surgical intervention.  Lumbar and thoracic MRI ordered, I will call with results.

## 2023-05-17 ENCOUNTER — OFFICE VISIT (OUTPATIENT)
Dept: ORTHOPEDICS | Facility: CLINIC | Age: 78
End: 2023-05-17
Payer: MEDICARE

## 2023-05-17 VITALS — BODY MASS INDEX: 30.78 KG/M2 | HEIGHT: 61 IN | WEIGHT: 163 LBS

## 2023-05-17 DIAGNOSIS — M54.9 DORSALGIA, UNSPECIFIED: Primary | ICD-10-CM

## 2023-05-17 DIAGNOSIS — G89.29 CHRONIC BILATERAL THORACIC BACK PAIN: ICD-10-CM

## 2023-05-17 DIAGNOSIS — M54.16 LUMBAR RADICULOPATHY: ICD-10-CM

## 2023-05-17 DIAGNOSIS — M54.6 CHRONIC BILATERAL THORACIC BACK PAIN: ICD-10-CM

## 2023-05-17 PROCEDURE — 1125F AMNT PAIN NOTED PAIN PRSNT: CPT | Mod: CPTII,,, | Performed by: REGISTERED NURSE

## 2023-05-17 PROCEDURE — 99204 PR OFFICE/OUTPT VISIT, NEW, LEVL IV, 45-59 MIN: ICD-10-PCS | Mod: ,,, | Performed by: REGISTERED NURSE

## 2023-05-17 PROCEDURE — 99999 PR PBB SHADOW E&M-EST. PATIENT-LVL III: CPT | Mod: PBBFAC,,, | Performed by: REGISTERED NURSE

## 2023-05-17 PROCEDURE — 3288F PR FALLS RISK ASSESSMENT DOCUMENTED: ICD-10-PCS | Mod: CPTII,,, | Performed by: REGISTERED NURSE

## 2023-05-17 PROCEDURE — 1159F PR MEDICATION LIST DOCUMENTED IN MEDICAL RECORD: ICD-10-PCS | Mod: CPTII,,, | Performed by: REGISTERED NURSE

## 2023-05-17 PROCEDURE — 1101F PR PT FALLS ASSESS DOC 0-1 FALLS W/OUT INJ PAST YR: ICD-10-PCS | Mod: CPTII,,, | Performed by: REGISTERED NURSE

## 2023-05-17 PROCEDURE — 99999 PR PBB SHADOW E&M-EST. PATIENT-LVL III: ICD-10-PCS | Mod: PBBFAC,,, | Performed by: REGISTERED NURSE

## 2023-05-17 PROCEDURE — 99204 OFFICE O/P NEW MOD 45 MIN: CPT | Mod: ,,, | Performed by: REGISTERED NURSE

## 2023-05-17 PROCEDURE — 1159F MED LIST DOCD IN RCRD: CPT | Mod: CPTII,,, | Performed by: REGISTERED NURSE

## 2023-05-17 PROCEDURE — 1125F PR PAIN SEVERITY QUANTIFIED, PAIN PRESENT: ICD-10-PCS | Mod: CPTII,,, | Performed by: REGISTERED NURSE

## 2023-05-17 PROCEDURE — 1101F PT FALLS ASSESS-DOCD LE1/YR: CPT | Mod: CPTII,,, | Performed by: REGISTERED NURSE

## 2023-05-17 PROCEDURE — 3288F FALL RISK ASSESSMENT DOCD: CPT | Mod: CPTII,,, | Performed by: REGISTERED NURSE

## 2023-05-17 RX ORDER — GABAPENTIN 300 MG/1
300 CAPSULE ORAL NIGHTLY
Qty: 30 CAPSULE | Refills: 11 | Status: SHIPPED | OUTPATIENT
Start: 2023-05-17 | End: 2024-05-16

## 2023-05-17 RX ORDER — METHOCARBAMOL 500 MG/1
500 TABLET, FILM COATED ORAL 4 TIMES DAILY PRN
Qty: 40 TABLET | Refills: 4 | Status: SHIPPED | OUTPATIENT
Start: 2023-05-17 | End: 2023-06-02 | Stop reason: SDUPTHER

## 2023-05-25 ENCOUNTER — HOSPITAL ENCOUNTER (OUTPATIENT)
Dept: RADIOLOGY | Facility: HOSPITAL | Age: 78
Discharge: HOME | End: 2023-05-25
Attending: PHYSICIAN ASSISTANT
Payer: MEDICARE

## 2023-05-25 ENCOUNTER — OFFICE VISIT (OUTPATIENT)
Dept: ORTHOPEDICS | Facility: CLINIC | Age: 78
End: 2023-05-25
Payer: MEDICARE

## 2023-05-25 DIAGNOSIS — M70.61 GREATER TROCHANTERIC BURSITIS OF RIGHT HIP: Primary | ICD-10-CM

## 2023-05-25 DIAGNOSIS — M25.559 HIP PAIN, UNSPECIFIED LATERALITY: ICD-10-CM

## 2023-05-25 PROCEDURE — 73502 XR HIP WITH PELVIS WHEN PERFORMED, 2 OR 3  VIEWS RIGHT: ICD-10-PCS | Mod: 26,RT,, | Performed by: RADIOLOGY

## 2023-05-25 PROCEDURE — 73502 X-RAY EXAM HIP UNI 2-3 VIEWS: CPT | Mod: TC,RT

## 2023-05-25 PROCEDURE — 73502 X-RAY EXAM HIP UNI 2-3 VIEWS: CPT | Mod: 26,RT,, | Performed by: RADIOLOGY

## 2023-05-25 PROCEDURE — 99213 OFFICE O/P EST LOW 20 MIN: CPT | Mod: S$GLB,,, | Performed by: PHYSICIAN ASSISTANT

## 2023-05-25 PROCEDURE — 99999 PR PBB SHADOW E&M-EST. PATIENT-LVL II: CPT | Mod: PBBFAC,,, | Performed by: PHYSICIAN ASSISTANT

## 2023-05-25 PROCEDURE — 99213 PR OFFICE/OUTPT VISIT, EST, LEVL III, 20-29 MIN: ICD-10-PCS | Mod: S$GLB,,, | Performed by: PHYSICIAN ASSISTANT

## 2023-05-25 PROCEDURE — 99999 PR PBB SHADOW E&M-EST. PATIENT-LVL II: ICD-10-PCS | Mod: PBBFAC,,, | Performed by: PHYSICIAN ASSISTANT

## 2023-05-25 RX ORDER — METHYLPREDNISOLONE 4 MG/1
TABLET ORAL
Qty: 1 TABLET | Refills: 0 | Status: SHIPPED | OUTPATIENT
Start: 2023-05-25 | End: 2023-06-02

## 2023-06-02 DIAGNOSIS — M54.6 CHRONIC BILATERAL THORACIC BACK PAIN: ICD-10-CM

## 2023-06-02 DIAGNOSIS — G89.29 CHRONIC BILATERAL THORACIC BACK PAIN: ICD-10-CM

## 2023-06-02 RX ORDER — METHOCARBAMOL 500 MG/1
500 TABLET, FILM COATED ORAL 4 TIMES DAILY PRN
Qty: 40 TABLET | Refills: 4 | Status: SHIPPED | OUTPATIENT
Start: 2023-06-02 | End: 2023-07-22

## 2023-06-12 NOTE — PROGRESS NOTES
Established Patient - Audio Only Telehealth Visit     The patient location is: home  The chief complaint leading to consultation is: MRI results  Visit type: Virtual visit with audio only (telephone)  Total time spent with patient: 15min     The reason for the audio only service rather than synchronous audio and video virtual visit was related to technical difficulties or patient preference/necessity.     Each patient to whom I provide medical services by telemedicine is:  (1) informed of the relationship between the physician and patient and the respective role of any other health care provider with respect to management of the patient; and (2) notified that they may decline to receive medical services by telemedicine and may withdraw from such care at any time. Patient verbally consented to receive this service via voice-only telephone call.    DATE: 6/15/2023  PATIENT: Ross Rodriguez    Attending Physician: Orestes Flores M.D.    HISTORY:  Ross Rodriguez is a 77 y.o. female who returns to me today for MRI results.  She was last seen by me 5/17/2023.  Today she is doing well but notes low back and intermittent right leg pain.    The patient denies myelopathic symptoms such as handwriting changes or difficulty with buttons/coins/keys. Denies perineal paresthesias, bowel/bladder dysfunction.    PMH/PSH/FamHx/SocHx:  Unchanged from prior visit    ROS:  REVIEW OF SYSTEMS:  Constitution: Negative. Negative for chills, fever and night sweats.   HENT: Negative for congestion and headaches.    Eyes: Negative for blurred vision, left vision loss and right vision loss.   Cardiovascular: Negative for chest pain and syncope.   Respiratory: Negative for cough and shortness of breath.    Endocrine: Negative for polydipsia, polyphagia and polyuria.   Hematologic/Lymphatic: Negative for bleeding problem. Does not bruise/bleed easily.   Skin: Negative for dry skin, itching and rash.   Musculoskeletal: Negative for falls and  muscle weakness.   Gastrointestinal: Negative for abdominal pain and bowel incontinence.   Allergic/Immunologic: Negative for hives and persistent infections.  Genitourinary: Negative for urinary retention/incontinence and nocturia.   Neurological: negative for disturbances in coordination, no myelopathic symptoms such as handwriting changes or difficulty with buttons, coins, keys or small objects. No loss of balance and seizures.   Psychiatric/Behavioral: Negative for depression. The patient does not have insomnia.   Denies perineal paresthesias, bowel or bladder incontinence    EXAM:  There were no vitals taken for this visit.  Stable.     IMAGING:    Today I personally re- reviewed AP, Lat and Flex/Ex  upright L-spine that demonstrate mild DDD at L3/4.      Thoracic MRI shows no spinal canal stenosis or neural foraminal narrowing within the thoracic spine.    Lumbar MRI shows mild spinal canal stenosis at L2-L3 with severe facet arthropathy at L3-L5.     There is no height or weight on file to calculate BMI.    Hemoglobin A1C   Date Value Ref Range Status   11/04/2022 5.5 4.0 - 5.6 % Final     Comment:     ADA Screening Guidelines:  5.7-6.4%  Consistent with prediabetes  >or=6.5%  Consistent with diabetes    High levels of fetal hemoglobin interfere with the HbA1C  assay. Heterozygous hemoglobin variants (HbS, HgC, etc)do  not significantly interfere with this assay.   However, presence of multiple variants may affect accuracy.     09/28/2021 5.4 4.0 - 5.6 % Final     Comment:     ADA Screening Guidelines:  5.7-6.4%  Consistent with prediabetes  >or=6.5%  Consistent with diabetes    High levels of fetal hemoglobin interfere with the HbA1C  assay. Heterozygous hemoglobin variants (HbS, HgC, etc)do  not significantly interfere with this assay.   However, presence of multiple variants may affect accuracy.     08/14/2020 5.4 4.0 - 5.6 % Final     Comment:     ADA Screening Guidelines:  5.7-6.4%  Consistent with  prediabetes  >or=6.5%  Consistent with diabetes  High levels of fetal hemoglobin interfere with the HbA1C  assay. Heterozygous hemoglobin variants (HbS, HgC, etc)do  not significantly interfere with this assay.   However, presence of multiple variants may affect accuracy.           ASSESSMENT/PLAN:    Diagnoses and all orders for this visit:    DDD (degenerative disc disease), lumbar      Today we discussed at length all of the different treatment options including anti-inflammatories, acetaminophen, rest, ice, heat, physical therapy including strengthening and stretching exercises, home exercises, ROM, aerobic conditioning, aqua therapy, other modalities including ultrasound, massage, and dry needling, epidural steroid injections and finally surgical intervention.    The patient will be scheduled in the clinic with pain mgmt for low back/right leg pain. She may follow up with me as needed.      This service was not originating from a related E/M service provided within the previous 7 days nor will  to an E/M service or procedure within the next 24 hours or my soonest available appointment.  Prevailing standard of care was able to be met in this audio-only visit.

## 2023-06-13 ENCOUNTER — INFUSION (OUTPATIENT)
Dept: INFECTIOUS DISEASES | Facility: HOSPITAL | Age: 78
End: 2023-06-13
Payer: MEDICARE

## 2023-06-13 VITALS
WEIGHT: 164.88 LBS | SYSTOLIC BLOOD PRESSURE: 154 MMHG | BODY MASS INDEX: 32.37 KG/M2 | RESPIRATION RATE: 18 BRPM | HEART RATE: 72 BPM | TEMPERATURE: 98 F | OXYGEN SATURATION: 97 % | DIASTOLIC BLOOD PRESSURE: 67 MMHG | HEIGHT: 60 IN

## 2023-06-13 DIAGNOSIS — M81.0 AGE-RELATED OSTEOPOROSIS WITHOUT CURRENT PATHOLOGICAL FRACTURE: Primary | ICD-10-CM

## 2023-06-13 PROCEDURE — 96372 THER/PROPH/DIAG INJ SC/IM: CPT

## 2023-06-13 PROCEDURE — 63600175 PHARM REV CODE 636 W HCPCS: Mod: JZ,JG | Performed by: NURSE PRACTITIONER

## 2023-06-13 RX ADMIN — DENOSUMAB 60 MG: 60 INJECTION SUBCUTANEOUS at 03:06

## 2023-06-14 ENCOUNTER — HOSPITAL ENCOUNTER (OUTPATIENT)
Dept: RADIOLOGY | Facility: HOSPITAL | Age: 78
Discharge: HOME | End: 2023-06-14
Attending: REGISTERED NURSE
Payer: MEDICARE

## 2023-06-14 DIAGNOSIS — M54.16 LUMBAR RADICULOPATHY: ICD-10-CM

## 2023-06-14 DIAGNOSIS — G89.29 CHRONIC BILATERAL THORACIC BACK PAIN: ICD-10-CM

## 2023-06-14 DIAGNOSIS — M54.6 CHRONIC BILATERAL THORACIC BACK PAIN: ICD-10-CM

## 2023-06-14 DIAGNOSIS — M54.9 DORSALGIA, UNSPECIFIED: ICD-10-CM

## 2023-06-14 PROCEDURE — 72148 MRI LUMBAR SPINE W/O DYE: CPT | Mod: TC

## 2023-06-14 PROCEDURE — 72148 MRI LUMBAR SPINE WITHOUT CONTRAST: ICD-10-PCS | Mod: 26,,, | Performed by: RADIOLOGY

## 2023-06-14 PROCEDURE — 72146 MRI THORACIC SPINE WITHOUT CONTRAST: ICD-10-PCS | Mod: 26,,, | Performed by: RADIOLOGY

## 2023-06-14 PROCEDURE — 72146 MRI CHEST SPINE W/O DYE: CPT | Mod: 26,,, | Performed by: RADIOLOGY

## 2023-06-14 PROCEDURE — 72146 MRI CHEST SPINE W/O DYE: CPT | Mod: TC

## 2023-06-14 PROCEDURE — 72148 MRI LUMBAR SPINE W/O DYE: CPT | Mod: 26,,, | Performed by: RADIOLOGY

## 2023-06-15 ENCOUNTER — TELEPHONE (OUTPATIENT)
Dept: ORTHOPEDICS | Facility: CLINIC | Age: 78
End: 2023-06-15

## 2023-06-15 ENCOUNTER — OFFICE VISIT (OUTPATIENT)
Dept: ORTHOPEDICS | Facility: CLINIC | Age: 78
End: 2023-06-15
Payer: MEDICARE

## 2023-06-15 DIAGNOSIS — M51.36 DDD (DEGENERATIVE DISC DISEASE), LUMBAR: Primary | ICD-10-CM

## 2023-06-15 PROCEDURE — 1160F RVW MEDS BY RX/DR IN RCRD: CPT | Mod: CPTII,95,, | Performed by: REGISTERED NURSE

## 2023-06-15 PROCEDURE — 1159F PR MEDICATION LIST DOCUMENTED IN MEDICAL RECORD: ICD-10-PCS | Mod: CPTII,95,, | Performed by: REGISTERED NURSE

## 2023-06-15 PROCEDURE — 1160F PR REVIEW ALL MEDS BY PRESCRIBER/CLIN PHARMACIST DOCUMENTED: ICD-10-PCS | Mod: CPTII,95,, | Performed by: REGISTERED NURSE

## 2023-06-15 PROCEDURE — 1159F MED LIST DOCD IN RCRD: CPT | Mod: CPTII,95,, | Performed by: REGISTERED NURSE

## 2023-06-15 PROCEDURE — 99442 PR PHYSICIAN TELEPHONE EVALUATION 11-20 MIN: ICD-10-PCS | Mod: 95,,, | Performed by: REGISTERED NURSE

## 2023-06-15 PROCEDURE — 99442 PR PHYSICIAN TELEPHONE EVALUATION 11-20 MIN: CPT | Mod: 95,,, | Performed by: REGISTERED NURSE

## 2023-06-15 NOTE — PROGRESS NOTES
Subjective:     Patient ID: Ross Rodriguez is a 77 y.o. female.    Chief Complaint: No chief complaint on file.    HPI    Patient is a 77 year old female who presents to clinic with chief complaint of bilateral lateral hip pain. Pain increased with palpation and laying on her side. She denied groin pain. She also has lower back pain.     Review of Systems   Constitutional: Negative for chills and fever.   Cardiovascular:  Negative for chest pain.   Respiratory:  Negative for cough and shortness of breath.    Skin:  Negative for color change, dry skin, itching, nail changes, poor wound healing and rash.   Musculoskeletal:  Positive for back pain.        Bilateral hip pain    Neurological:  Negative for dizziness.   Psychiatric/Behavioral:  Negative for altered mental status. The patient is not nervous/anxious.    All other systems reviewed and are negative.    Objective:       General    Constitutional: She is oriented to person, place, and time. She appears well-developed and well-nourished. No distress.   HENT:   Head: Atraumatic.   Eyes: Conjunctivae are normal.   Cardiovascular:  Normal rate.            Pulmonary/Chest: Effort normal.   Neurological: She is alert and oriented to person, place, and time.   Psychiatric: She has a normal mood and affect. Her behavior is normal.             Right Hip Exam     Tenderness   The patient tender to palpation of the trochanteric bursa.    Range of Motion   The patient has normal right hip ROM.    Tests   Pain w/ forced internal rotation (GAVI): absent  Pain w/ forced external rotation (FADIR): absent  Log Roll: negative  Left Hip Exam     Tenderness   The patient tender to palpation of the trochanteric bursa.    Range of Motion   The patient has normal left hip ROM.    Tests   Pain w/ forced internal rotation (GAVI): absent  Pain w/ forced external rotation (FADIR): absent  Log Roll: negative          Muscle Strength   Right Lower Extremity   Hip Abduction: 4/5   Hip  Adduction: 4/5   Hip Flexion: 4/5   Left Lower Extremity   Hip Abduction: 4/5   Hip Adduction: 4/5   Hip Flexion: 4/5     Physical Exam  Constitutional:       General: She is not in acute distress.     Appearance: She is well-developed and well-nourished. She is not diaphoretic.   HENT:      Head: Atraumatic.   Eyes:      Conjunctiva/sclera: Conjunctivae normal.   Cardiovascular:      Rate and Rhythm: Normal rate.   Pulmonary:      Effort: Pulmonary effort is normal.   Neurological:      Mental Status: She is alert and oriented to person, place, and time.   Psychiatric:         Mood and Affect: Mood and affect normal.         Behavior: Behavior normal.       RADS:   Osseous mineralization is preserved.  No acute displaced fractures.  No suspicious lytic or blastic lesions.  Cartilage spaces are preserved.  No subluxation or dislocation.  SI joints are symmetric.  Pubic symphysis is unremarkable.  Mild degenerative changes of the visualized lower lumbar spine.       Assessment:     Encounter Diagnoses   Name Primary?    Hip pain, unspecified laterality     Greater trochanteric bursitis of right hip Yes       Plan:      Started on medrol dose elizabeth, Recommend follow up with back and spine if continued symptoms.

## 2023-06-15 NOTE — TELEPHONE ENCOUNTER
Attempt to contact patient regarding an appointment with pain management. Left message stating that her appointment has been scheduled to see Dr. Brian Nance for 11:00 am on 07/24/2023. Also left number for patient to return call back to [phone]. Thanks.

## 2023-07-20 DIAGNOSIS — I71.21 ANEURYSM OF ASCENDING AORTA WITHOUT RUPTURE: Primary | ICD-10-CM

## 2023-07-24 ENCOUNTER — OFFICE VISIT (OUTPATIENT)
Dept: PAIN MEDICINE | Facility: CLINIC | Age: 78
End: 2023-07-24
Payer: MEDICARE

## 2023-07-24 VITALS
SYSTOLIC BLOOD PRESSURE: 148 MMHG | HEART RATE: 56 BPM | WEIGHT: 164.88 LBS | DIASTOLIC BLOOD PRESSURE: 67 MMHG | HEIGHT: 60 IN | BODY MASS INDEX: 32.37 KG/M2

## 2023-07-24 DIAGNOSIS — M70.61 GREATER TROCHANTERIC BURSITIS OF RIGHT HIP: Primary | ICD-10-CM

## 2023-07-24 DIAGNOSIS — M47.816 LUMBAR SPONDYLOSIS: ICD-10-CM

## 2023-07-24 PROCEDURE — 1101F PR PT FALLS ASSESS DOC 0-1 FALLS W/OUT INJ PAST YR: ICD-10-PCS | Mod: CPTII,S$GLB,, | Performed by: STUDENT IN AN ORGANIZED HEALTH CARE EDUCATION/TRAINING PROGRAM

## 2023-07-24 PROCEDURE — 1125F PR PAIN SEVERITY QUANTIFIED, PAIN PRESENT: ICD-10-PCS | Mod: CPTII,S$GLB,, | Performed by: STUDENT IN AN ORGANIZED HEALTH CARE EDUCATION/TRAINING PROGRAM

## 2023-07-24 PROCEDURE — 3288F PR FALLS RISK ASSESSMENT DOCUMENTED: ICD-10-PCS | Mod: CPTII,S$GLB,, | Performed by: STUDENT IN AN ORGANIZED HEALTH CARE EDUCATION/TRAINING PROGRAM

## 2023-07-24 PROCEDURE — 99204 PR OFFICE/OUTPT VISIT, NEW, LEVL IV, 45-59 MIN: ICD-10-PCS | Mod: 25,S$GLB,, | Performed by: STUDENT IN AN ORGANIZED HEALTH CARE EDUCATION/TRAINING PROGRAM

## 2023-07-24 PROCEDURE — 1125F AMNT PAIN NOTED PAIN PRSNT: CPT | Mod: CPTII,S$GLB,, | Performed by: STUDENT IN AN ORGANIZED HEALTH CARE EDUCATION/TRAINING PROGRAM

## 2023-07-24 PROCEDURE — 20611 DRAIN/INJ JOINT/BURSA W/US: CPT | Mod: RT,S$GLB,, | Performed by: STUDENT IN AN ORGANIZED HEALTH CARE EDUCATION/TRAINING PROGRAM

## 2023-07-24 PROCEDURE — 1101F PT FALLS ASSESS-DOCD LE1/YR: CPT | Mod: CPTII,S$GLB,, | Performed by: STUDENT IN AN ORGANIZED HEALTH CARE EDUCATION/TRAINING PROGRAM

## 2023-07-24 PROCEDURE — 1159F MED LIST DOCD IN RCRD: CPT | Mod: CPTII,S$GLB,, | Performed by: STUDENT IN AN ORGANIZED HEALTH CARE EDUCATION/TRAINING PROGRAM

## 2023-07-24 PROCEDURE — 3078F PR MOST RECENT DIASTOLIC BLOOD PRESSURE < 80 MM HG: ICD-10-PCS | Mod: CPTII,S$GLB,, | Performed by: STUDENT IN AN ORGANIZED HEALTH CARE EDUCATION/TRAINING PROGRAM

## 2023-07-24 PROCEDURE — 20611 PR DRAIN/ASP/INJECT MAJOR JOINT/BURSA W/US GUIDANCE: ICD-10-PCS | Mod: RT,S$GLB,, | Performed by: STUDENT IN AN ORGANIZED HEALTH CARE EDUCATION/TRAINING PROGRAM

## 2023-07-24 PROCEDURE — 3288F FALL RISK ASSESSMENT DOCD: CPT | Mod: CPTII,S$GLB,, | Performed by: STUDENT IN AN ORGANIZED HEALTH CARE EDUCATION/TRAINING PROGRAM

## 2023-07-24 PROCEDURE — 1159F PR MEDICATION LIST DOCUMENTED IN MEDICAL RECORD: ICD-10-PCS | Mod: CPTII,S$GLB,, | Performed by: STUDENT IN AN ORGANIZED HEALTH CARE EDUCATION/TRAINING PROGRAM

## 2023-07-24 PROCEDURE — 99999 PR PBB SHADOW E&M-EST. PATIENT-LVL III: ICD-10-PCS | Mod: PBBFAC,,, | Performed by: STUDENT IN AN ORGANIZED HEALTH CARE EDUCATION/TRAINING PROGRAM

## 2023-07-24 PROCEDURE — 3077F SYST BP >= 140 MM HG: CPT | Mod: CPTII,S$GLB,, | Performed by: STUDENT IN AN ORGANIZED HEALTH CARE EDUCATION/TRAINING PROGRAM

## 2023-07-24 PROCEDURE — 3078F DIAST BP <80 MM HG: CPT | Mod: CPTII,S$GLB,, | Performed by: STUDENT IN AN ORGANIZED HEALTH CARE EDUCATION/TRAINING PROGRAM

## 2023-07-24 PROCEDURE — 99204 OFFICE O/P NEW MOD 45 MIN: CPT | Mod: 25,S$GLB,, | Performed by: STUDENT IN AN ORGANIZED HEALTH CARE EDUCATION/TRAINING PROGRAM

## 2023-07-24 PROCEDURE — 3077F PR MOST RECENT SYSTOLIC BLOOD PRESSURE >= 140 MM HG: ICD-10-PCS | Mod: CPTII,S$GLB,, | Performed by: STUDENT IN AN ORGANIZED HEALTH CARE EDUCATION/TRAINING PROGRAM

## 2023-07-24 PROCEDURE — 99999 PR PBB SHADOW E&M-EST. PATIENT-LVL III: CPT | Mod: PBBFAC,,, | Performed by: STUDENT IN AN ORGANIZED HEALTH CARE EDUCATION/TRAINING PROGRAM

## 2023-07-24 RX ORDER — TRIAMCINOLONE ACETONIDE 40 MG/ML
40 INJECTION, SUSPENSION INTRA-ARTICULAR; INTRAMUSCULAR
Status: COMPLETED | OUTPATIENT
Start: 2023-07-24 | End: 2023-07-24

## 2023-07-24 RX ADMIN — TRIAMCINOLONE ACETONIDE 40 MG: 40 INJECTION, SUSPENSION INTRA-ARTICULAR; INTRAMUSCULAR at 12:07

## 2023-07-24 NOTE — PROCEDURES
Procedures  PROCEDURE: right Greater Trochanteric Bursa Injection with Ultrasound Guidance    PATIENT NAME: Ross Rodriguez   MRN: [id number]     DATE OF PROCEDURE: 07/24/2023    Patient Name: Ross Rodriguez  MRN: [id number]    PROCEDURE DATE: 7/24/2023    Diagnosis: Trochanteric Bursitis  CPT code:  70638 (ARTHROCENTESIS, ASPIRATION AND/OR INJECTION, MAJOR JOINT OR BURSA (EG, SHOULDER, HIP, KNEE, SUBACROMIAL BURSA); WITH ULTRASOUND GUIDANCE, WITH PERMANENT RECORDING AND REPORTING)  Postprocedural Diagnosis: Same  Needle Type: - Stimuplex 21G x 100mm needle    Solution injected: A 6ml mixture of 0.25% Bupivacaine and 20mg Kenalog to each location    Estimated Blood Loss - <2ml  Drains: None  Specimens Removed: None  Urine Output - Not Measured  Complications: None  Outcome: Good  VAS Before Injection:  5/10  VAS After Injection:  0/10    Informed Consent:  The patient's condition and proposed procedures, risks (including complications of nerve damage,  bleeding, infection, and failure of pain relief), and alternatives were discussed with the patient or responsible party.  The patient's/responsible party's questions were answered.  The patient/responsible party appeared to understand and chose to proceed.  Informed consent was obtained.    Procedure in Detail:  The procedure was performed in the procedure treatment room.  After obtaining written consent, the patient was placed in a lateral position. The target site of injection was identified using the ultrasound in the short axis view. Any vascular structure located close to the injection site were also noted. The skin overlying the target site of injection was prepped and draped in an aseptic fashion.  The skin entry site was identified and marked.     Procedural Pause:  A procedural pause verifying correct patient, medical record number, allergies, medications to be administered, current vital signs, and surgical site was performed immediately prior to beginning the  procedure.    The greater trochanteric bursa and surrounding structures (structures identified: anterior, lateral, and posterior facets ; glut medius and loretta tendons) were identified using ultrasound guidance in a short-axis view at the lateral hip. The skin and subcutaneous tissue overlying the target site of injection was anesthetized using 3 ml of 1% lidocaine MPF with a 27-gauge, 1½ -inch needle. The block needle was inserted and advanced under live US guidance using an in-plane view and hydrodissecting with normal saline as needed, keeping the needle perpendicular to the beam, with an endpoint at the bursal space between the glut med and max tendons. There was no evidence of heme or paresthesia. Subsequently, the injectate mixture was slowly and incrementally injected without resistance. The needle was then retracted.    The patient tolerated the procedure well and without complications. After meeting discharge criteria, the patient was discharged home safely.    The above noted procedure was not repeated on the opposite side.    DISCUSSION:  Today we performed a *Greater Trochanteric Bursa injection.  The patient was informed that it may take several days for the steroid medication to reach its full efficacy and they should continue their regular pain medications as prescribed.  The patient was advised to relax and avoid any heavy lifting or excessive bending for 24 hours.   She was advised that she may return to her usual activities after 24 hours if she is otherwise feeling well.  The patient was advised not to bathe or soak in water for 24 hours but that showering would be acceptable.  The patient was instructed that if she experienced fever or chills, new weakness, new sensory changes, any changes in bowel or bladder habits, worsening back pain, new headache, neck stiffness, or other new symptoms, that she should contact the pain clinic immediately or dial 911 if unable to reach the pain  clinic.      Note Electronically Signed By:  Brian Sales  07/24/2023

## 2023-07-24 NOTE — PROGRESS NOTES
Chronic Pain - New Consult    Referring Physician: No ref. provider found    Date: 07/24/2023     Re: Ross Rodriguez  MR#: [id number]  YOB: 1945  Age: 78 y.o.    Chief Complaint:   Chief Complaint   Patient presents with    Low-back Pain     **This note is dictated using the M*Modal Fluency Direct word recognition program. There are word recognition mistakes that are occasionally missed on review.**    ASSESSMENT: 78 y.o. year old female with back pain, consistent with     1. Greater trochanteric bursitis of right hip        2. Lumbar spondylosis          PLAN:     Lumbar radiculopathy  -better with gabapentin.    Lumbar spondylosis  -discussed MBB/RFA.  Defer for now.  -reviewed MRI with patient. Facet arthropathy worse on the Right in the L3-S1 and on the left at the L2-3.  -continue HEP    Right trochanteric bursitis  -We will schedule the patient for right GTB.  Risks,benefits, and alternatives of the procedure were discussed with the patient and She would like to proceed with the procedure.  At this juncture, we believe that the patient's medical comorbidity status adds low risk and complexity to our proposed evaluation and treatment.  -provided with GTB exercises  -cannot have NSAIDs    - RTC 3-4 months  - Counseled patient regarding the importance of weight loss and activity modification and physical therapy.    The above plan and management options were discussed at length with patient. Patient is in agreement with the above and verbalized understanding. It will be communicated with the referring physician via electronic record, fax, or mail.  Lab/study reports reviewed were important and necessary because subsequent medical and treatment recommendations required review of the above lab/study reports. Images viewed/reviewed above were important and necessary because subsequent medical and treatment recommendations required review of the reviewed image(s).     Electronically signed by:  Brian  "DO Roscoe  07/24/2023    =========================================================================================================    SUBJECTIVE:    Ross Rodriguez is a 78 y.o. female presents to the clinic for the evaluation of lower back pain. The pain started 30 years ago following no inciting event and symptoms have been worsening.  The patient has low back pain which has been ongoing for many years.  But her biggest issue is that she has had right sided lateral hip pain which has been ongoing for about 1 year.  It is worse with pressure and laying on the right side.  She completed PT which was not helpful.  This all started after a long bus trip to [place], [place].  The patient states that things were really bad until about June when something just "happened" and it started feeling better.    Pain Description:    The pain is located in the lower back area and radiates to the to hips and buttock .    At BEST  1/10   At WORST  8/10 on the WORST day.    On average pain is rated as 5/10.   Today the pain is rated as 2/10  The pain is continuous.  The pain is described as aching, pulsating, shooting, stabbing, and tingling    Symptoms interfere with daily activity.   Exacerbating factors: daily activity.    Mitigating factors heat, ice, medications, and rest.   She reports 7 hours of sleep per night.    Physical Therapy/Home Exercise: Yes, currently in Physical therapy    Current Pain Medications:    - gabapentin 300mg qhs (helpful), methocarbamol 250mg qhs prn, tylenol 500mg (1-2 pills at a time), lidocaine, TENS, heat/ice    Failed Pain Medications:    - tylenol    Pain Treatment Therapies:    Pain procedures:   Bursitis injections  Physical Therapy: yes for back pain  Chiropractor: none  Acupuncture: none  TENS unit: yes  Spinal decompression: none  Joint replacement: none    Patient denies urinary incontinence, bowel incontinence, significant motor weakness, and loss of sensations.  Patient denies " any suicidal or homicidal ideations     report:  Reviewed and consistent with medication use as prescribed.    Imaging:   Lumbar MRI 06/2023:     Degenerative findings:     C7-T1: No spinal canal stenosis or neural foraminal narrowing.     T1-T12: Mild circumferential disc bulges at T10-T11 and T11-T12.  No spinal canal stenosis or neural foraminal narrowing.     T12-L1: Small right central disc protrusion.  No spinal canal stenosis or neural foraminal narrowing.     L1-L2: Posterior annular fissure with small central disc extrusion.  Mild ligamentum flavum hypertrophy.  No spinal canal stenosis or neural foraminal narrowing.     L2-L3: Circumferential disc bulge.  Mild bilateral facet hypertrophy and ligamentum flavum thickening.  No neural foraminal narrowing.  Mild spinal canal stenosis.     L3-L4: Circumferential disc bulge.  Severe right and moderate left facet hypertrophy and ligamentum flavum thickening.  No spinal canal stenosis or neural foraminal narrowing.     L4-L5: Severe right and mild left facet hypertrophy.  Bilateral ligament flavum hypertrophy.  No spinal canal stenosis or neural foraminal narrowing.     L5-S1: Moderate bilateral facet hypertrophy and ligamentum flavum thickening.  No spinal canal stenosis or neural foraminal narrowing.     Paraspinal muscles & soft tissues: Mild edema within the deep subcutaneous tissues surrounding the right L4-L5 facet joint.     Impression:     1. Multilevel lumbar spondylosis contributing to mild spinal canal stenosis at L2-L3.  Note made of severe facet arthropathy at L3-L5.  2. No spinal canal stenosis or neural foraminal narrowing within the thoracic spine.    Hip XR 05/2023:  FINDINGS:  Osseous mineralization is preserved.  No acute displaced fractures.  No suspicious lytic or blastic lesions.  Cartilage spaces are preserved.  No subluxation or dislocation.  SI joints are symmetric.  Pubic symphysis is unremarkable.  Mild degenerative changes of the  visualized lower lumbar spine.    Pain Disability Index (PDI) 7/24/2023   Family/Home Responsibilities: 5   Recreation: 5   Occupation: 5   Sexual Behavior: 5   Self Care: 5   Life-Support Activities: 5   Pain Disability Index (PDI) 35        Past Medical History:   Diagnosis Date    Allergy     Anxiety     Aortic atherosclerosis: see CT scan 7/18 8/21/2018    Arthritis     Cataract     Depression     Diverticulosis 7/21/2015    GERD (gastroesophageal reflux disease)     Hyperlipidemia     Joint pain     Liver cyst: see CT 2009 6/21/2016    Lower back pain 7/29/2019    Lung cyst: R side see CT 2009 6/21/2016    Nuclear sclerosis - Both Eyes 2/14/2014    Osteoporosis     Primary insomnia 6/21/2016    Primary insomnia 6/21/2016     Past Surgical History:   Procedure Laterality Date    CHOLECYSTECTOMY      DILATION AND CURETTAGE OF UTERUS      WISDOM TOOTH EXTRACTION       Social History     Socioeconomic History    Marital status:     Number of children: 1   Occupational History    Occupation: retired    Tobacco Use    Smoking status: Never    Smokeless tobacco: Never   Substance and Sexual Activity    Alcohol use: Yes     Comment: occasional, 1 drink every 3 months    Drug use: No    Sexual activity: Not Currently     Birth control/protection: Post-menopausal     Social Determinants of Health     Financial Resource Strain: Low Risk     Difficulty of Paying Living Expenses: Not hard at all   Food Insecurity: No Food Insecurity    Worried About Running Out of Food in the Last Year: Never true    Ran Out of Food in the Last Year: Never true   Transportation Needs: No Transportation Needs    Lack of Transportation (Medical): No    Lack of Transportation (Non-Medical): No   Physical Activity: Insufficiently Active    Days of Exercise per Week: 3 days    Minutes of Exercise per Session: 20 min   Stress: Stress Concern Present    Feeling of Stress : To some extent   Social Connections: Moderately Integrated     Frequency of Communication with Friends and Family: More than three times a week    Frequency of Social Gatherings with Friends and Family: Once a week    Attends Evangelical Services: More than 4 times per year    Active Member of Clubs or Organizations: Yes    Attends Club or Organization Meetings: More than 4 times per year    Marital Status:    Housing Stability: Low Risk     Unable to Pay for Housing in the Last Year: No    Number of Places Lived in the Last Year: 1    Unstable Housing in the Last Year: No     Family History   Problem Relation Age of Onset    Stroke Mother     Heart disease Mother         rheumatic fever    Cancer Father         Bladder    COPD Father     Heart disease Father         CHF    Kidney disease Father     Diabetes Father     Hearing loss Father     No Known Problems Son     Skin cancer Paternal Aunt     Thrombosis Maternal Grandfather     Cancer Paternal Grandmother         stomach    Nevaeh's disease Paternal Grandfather     Melanoma Neg Hx     Amblyopia Neg Hx     Blindness Neg Hx     Cataracts Neg Hx     Glaucoma Neg Hx     Hypertension Neg Hx     Macular degeneration Neg Hx     Retinal detachment Neg Hx     Strabismus Neg Hx     Thyroid disease Neg Hx        Review of patient's allergies indicates:   Allergen Reactions    Fosamax [alendronate] Nausea Only    Nsaids (non-steroidal anti-inflammatory drug) Hives    Sulfa (sulfonamide antibiotics) Hives       Current Outpatient Medications   Medication Sig    acetaminophen (TYLENOL) 500 mg Cap     calcium citrate-vitamin D2 1,500-200 mg-unit Tab     denosumab (PROLIA) 60 mg/mL Syrg Inject 60 mg into the skin every 6 (six) months.    fish oil-omega-3 fatty acids 300-1,000 mg capsule Take 2 g by mouth once daily. Takes 2 times daily    FLAXSEED ORAL Take 1 application by mouth once daily at 6am. Add seeds to oatmeal    gabapentin (NEURONTIN) 300 MG capsule Take 1 capsule (300 mg total) by mouth every evening.    hydrOXYzine  HCL (ATARAX) 25 MG tablet Take 1 tablet (25 mg total) by mouth daily as needed for Anxiety.    metoprolol succinate (TOPROL-XL) 25 MG 24 hr tablet Take 1 tablet (25 mg total) by mouth once daily.    multivitamin (THERAGRAN) per tablet Take 1 tablet by mouth once daily.    rosuvastatin (CRESTOR) 10 MG tablet Take 1 tablet (10 mg total) by mouth once daily.    LORazepam (ATIVAN) 0.5 MG tablet Take 1 tablet (0.5 mg total) by mouth as needed for Anxiety (May take 1-2 x weekly for anxiety or for travel). (Patient not taking: Reported on 4/25/2023)     No current facility-administered medications for this visit.       REVIEW OF SYSTEMS:    GENERAL:  No weight loss, malaise or fevers.  HEENT:   No recent changes in vision or hearing  NECK:  Negative for lumps, no difficulty with swallowing.  RESPIRATORY:  Negative for cough, wheezing or shortness of breath, patient denies any recent URI.  CARDIOVASCULAR:  Negative for chest pain, leg swelling or palpitations.  GI:  Negative for abdominal discomfort, blood in stools or black stools or change in bowel habits.  MUSCULOSKELETAL:  See HPI.  SKIN:  Negative for lesions, rash, and itching.  PSYCH:  No mood disorder or recent psychosocial stressors.  Patients sleep is not disturbed secondary to pain.  HEMATOLOGY/LYMPHOLOGY:  Negative for prolonged bleeding, bruising easily or swollen nodes.  Patient is not currently taking any anti-coagulants  NEURO:   No history of headaches, syncope, paralysis, seizures or tremors.  All other reviewed and negative other than HPI.    OBJECTIVE:    BP (!) 148/67 (BP Location: Right arm, Patient Position: Sitting)   Pulse (!) 56   Ht 5' (1.524 m)   Wt 74.8 kg (164 lb 14.5 oz)   BMI 32.21 kg/m²     PHYSICAL EXAMINATION:    GENERAL: Well appearing, in no acute distress, alert and oriented x3.  PSYCH:  Mood and affect appropriate.  SKIN: Skin color, texture, turgor normal, no rashes or lesions.  HEAD/FACE:  Normocephalic, atraumatic. Cranial  nerves grossly intact.  CV: RRR with palpation of the radial artery.  PULM: CTAB. No evidence of respiratory difficulty, symmetric chest rise.  GI:  Soft and non-tender.    BACK:   - No obvious deformity or signs of trauma, Normal lumbar lordotic curve  - Negative spinous process tenderness  - Negative paravertebral tenderness  - Negative pain to palpation over the facet joints of the lumbar spine.   - Negative QL / Iliac crest / Glut tenderness  - Slump test is Negative for radicular pain  - Slump test is Negative for back pain  - Supine Straight leg raising is Negative for radicular pain  - Supine Straight leg raising is Negative for back pain  - Lumbar ROM is normal in Flexion without pain  - Lumbar ROM is normal in Extension with pain  - Lumbar ROM is normal in Lateral Flexion without pain  - Lumbar ROM is normal in Rotation without pain  - Negative Sustained Hip Flexion test (for discogenic pain)  - Negative Altered Gait, Posture  - Axial facet loading test Positive on the right side(s)    SI Joint exam:  - Negative SI joint tenderness to palpation  - Armando's sign Negative  - Yeoman's Test: Did not perform for SI joint pain indicating anterior SI ligament involvement. Did not perform for anterior thigh pain/paresthesia which indicates femoral nerve stretch.  - Gaenslen's Test:Negative  - Finger Jade's Sign:Negative  - SI compression test:Did not perform  - SI distraction test:Negative  - Thigh Thrust: Negative  - SI Thrust: Did not perform    MUSKULOSKELETAL:    EXTREMITIES:   Hip Exam:  - Log Roll Negative  - FADIR Negative  - Stinchfield Negative  - Hip Scour Negative  - GTB Tenderness Positive    MUSCULOSKELETAL:  No atrophy or tone abnormalities are noted in the UE or LE.  No deformities, edema, or skin discoloration are noted on visible skin. Good capillary refill.    NEURO: Bilateral upper and lower extremity coordination and muscle stretch reflexes are physiologic and symmetric.      NEUROLOGICAL  EXAM:  MENTAL STATUS: A x O x 3, good concentration, speech is fluent and goal directed  MEMORY: recent and remote are intact  CN: CN2-12 grossly intact  MOTOR: 5/5 in all muscle groups  DTRs: 2+ intact symmetric  Sensation:    -no Loss of sensation in a left lower and right lower L-1, L-2, L-3, L-4, and L-5 bilaterally distribution.  Babinski: absent on the bilateral side(s)  Head: absent on the bilateral side(s)  Clonus: absent on the bilateral side(s)    GAIT: normal.

## 2023-08-02 ENCOUNTER — PATIENT MESSAGE (OUTPATIENT)
Dept: CARDIOTHORACIC SURGERY | Facility: CLINIC | Age: 78
End: 2023-08-02
Payer: MEDICARE

## 2023-08-14 ENCOUNTER — PATIENT MESSAGE (OUTPATIENT)
Dept: ADMINISTRATIVE | Facility: HOSPITAL | Age: 78
End: 2023-08-14
Payer: MEDICARE

## 2023-08-29 ENCOUNTER — OFFICE VISIT (OUTPATIENT)
Dept: OPTOMETRY | Facility: CLINIC | Age: 78
End: 2023-08-29
Payer: MEDICARE

## 2023-08-29 DIAGNOSIS — H04.123 BILATERAL DRY EYES: ICD-10-CM

## 2023-08-29 DIAGNOSIS — H25.13 NUCLEAR SCLEROSIS, BILATERAL: Primary | ICD-10-CM

## 2023-08-29 DIAGNOSIS — H52.4 BILATERAL PRESBYOPIA: ICD-10-CM

## 2023-08-29 PROCEDURE — 92014 PR EYE EXAM, EST PATIENT,COMPREHESV: ICD-10-PCS | Mod: S$GLB,,, | Performed by: OPTOMETRIST

## 2023-08-29 PROCEDURE — 1126F PR PAIN SEVERITY QUANTIFIED, NO PAIN PRESENT: ICD-10-PCS | Mod: CPTII,S$GLB,, | Performed by: OPTOMETRIST

## 2023-08-29 PROCEDURE — 92015 DETERMINE REFRACTIVE STATE: CPT | Mod: S$GLB,,, | Performed by: OPTOMETRIST

## 2023-08-29 PROCEDURE — 92014 COMPRE OPH EXAM EST PT 1/>: CPT | Mod: S$GLB,,, | Performed by: OPTOMETRIST

## 2023-08-29 PROCEDURE — 92015 PR REFRACTION: ICD-10-PCS | Mod: S$GLB,,, | Performed by: OPTOMETRIST

## 2023-08-29 PROCEDURE — 99999 PR PBB SHADOW E&M-EST. PATIENT-LVL III: ICD-10-PCS | Mod: PBBFAC,,, | Performed by: OPTOMETRIST

## 2023-08-29 PROCEDURE — 1159F PR MEDICATION LIST DOCUMENTED IN MEDICAL RECORD: ICD-10-PCS | Mod: CPTII,S$GLB,, | Performed by: OPTOMETRIST

## 2023-08-29 PROCEDURE — 99999 PR PBB SHADOW E&M-EST. PATIENT-LVL III: CPT | Mod: PBBFAC,,, | Performed by: OPTOMETRIST

## 2023-08-29 PROCEDURE — 1159F MED LIST DOCD IN RCRD: CPT | Mod: CPTII,S$GLB,, | Performed by: OPTOMETRIST

## 2023-08-29 PROCEDURE — 1160F PR REVIEW ALL MEDS BY PRESCRIBER/CLIN PHARMACIST DOCUMENTED: ICD-10-PCS | Mod: CPTII,S$GLB,, | Performed by: OPTOMETRIST

## 2023-08-29 PROCEDURE — 1160F RVW MEDS BY RX/DR IN RCRD: CPT | Mod: CPTII,S$GLB,, | Performed by: OPTOMETRIST

## 2023-08-29 PROCEDURE — 1126F AMNT PAIN NOTED NONE PRSNT: CPT | Mod: CPTII,S$GLB,, | Performed by: OPTOMETRIST

## 2023-08-30 NOTE — PROGRESS NOTES
HPI    79 y/o female here for annual eye exam  Pt state [facility] in [facility] since [facility] visit   Dry eye OU   Denies f/f      POHx: (+)YENIFER OU (+)cataracts OU (+)drusen OU     FOHx: (+)cataracts - mother, father    Last edited by Daisy Morales on 8/29/2023  3:22 PM.            Assessment /Plan     For exam results, see Encounter Report.    Nuclear sclerosis, bilateral    Bilateral dry eyes    Bilateral presbyopia      Educated pt on presence of cataracts and effects on vision. No surgery at this time. Recheck in one year.   2. Recommend artificial tears. 1 drop 2x per day. Chronicity of disease and treatment discussed.   3. New Spectacle Rx given, discussed different options for glasses. RTC 1 year routine eye exam.

## 2023-09-19 ENCOUNTER — TELEPHONE (OUTPATIENT)
Dept: PAIN MEDICINE | Facility: CLINIC | Age: 78
End: 2023-09-19
Payer: MEDICARE

## 2023-09-19 NOTE — TELEPHONE ENCOUNTER
Called patient to discuss moving procedure date off of 10/26 patient agrees and moved to 11/8 at 1046

## 2023-09-21 ENCOUNTER — HOSPITAL ENCOUNTER (OUTPATIENT)
Dept: RADIOLOGY | Facility: HOSPITAL | Age: 78
Discharge: HOME | End: 2023-09-21
Attending: THORACIC SURGERY (CARDIOTHORACIC VASCULAR SURGERY)
Payer: MEDICARE

## 2023-09-21 ENCOUNTER — OFFICE VISIT (OUTPATIENT)
Dept: CARDIOTHORACIC SURGERY | Facility: CLINIC | Age: 78
End: 2023-09-21
Payer: MEDICARE

## 2023-09-21 VITALS
DIASTOLIC BLOOD PRESSURE: 64 MMHG | HEART RATE: 77 BPM | BODY MASS INDEX: 31.96 KG/M2 | WEIGHT: 162.81 LBS | HEIGHT: 60 IN | OXYGEN SATURATION: 99 % | SYSTOLIC BLOOD PRESSURE: 129 MMHG

## 2023-09-21 DIAGNOSIS — I71.21 ANEURYSM OF ASCENDING AORTA WITHOUT RUPTURE: ICD-10-CM

## 2023-09-21 DIAGNOSIS — I77.819 AORTIC ECTASIA: Primary | ICD-10-CM

## 2023-09-21 PROCEDURE — 71250 CT CHEST WITHOUT CONTRAST: ICD-10-PCS | Mod: 26,,, | Performed by: RADIOLOGY

## 2023-09-21 PROCEDURE — 99213 OFFICE O/P EST LOW 20 MIN: CPT | Mod: S$GLB,,, | Performed by: THORACIC SURGERY (CARDIOTHORACIC VASCULAR SURGERY)

## 2023-09-21 PROCEDURE — 99999 PR PBB SHADOW E&M-EST. PATIENT-LVL III: ICD-10-PCS | Mod: PBBFAC,,, | Performed by: THORACIC SURGERY (CARDIOTHORACIC VASCULAR SURGERY)

## 2023-09-21 PROCEDURE — 99999 PR PBB SHADOW E&M-EST. PATIENT-LVL III: CPT | Mod: PBBFAC,,, | Performed by: THORACIC SURGERY (CARDIOTHORACIC VASCULAR SURGERY)

## 2023-09-21 PROCEDURE — 3078F PR MOST RECENT DIASTOLIC BLOOD PRESSURE < 80 MM HG: ICD-10-PCS | Mod: CPTII,S$GLB,, | Performed by: THORACIC SURGERY (CARDIOTHORACIC VASCULAR SURGERY)

## 2023-09-21 PROCEDURE — 3078F DIAST BP <80 MM HG: CPT | Mod: CPTII,S$GLB,, | Performed by: THORACIC SURGERY (CARDIOTHORACIC VASCULAR SURGERY)

## 2023-09-21 PROCEDURE — 1126F PR PAIN SEVERITY QUANTIFIED, NO PAIN PRESENT: ICD-10-PCS | Mod: CPTII,S$GLB,, | Performed by: THORACIC SURGERY (CARDIOTHORACIC VASCULAR SURGERY)

## 2023-09-21 PROCEDURE — 71250 CT THORAX DX C-: CPT | Mod: TC

## 2023-09-21 PROCEDURE — 1126F AMNT PAIN NOTED NONE PRSNT: CPT | Mod: CPTII,S$GLB,, | Performed by: THORACIC SURGERY (CARDIOTHORACIC VASCULAR SURGERY)

## 2023-09-21 PROCEDURE — 3074F PR MOST RECENT SYSTOLIC BLOOD PRESSURE < 130 MM HG: ICD-10-PCS | Mod: CPTII,S$GLB,, | Performed by: THORACIC SURGERY (CARDIOTHORACIC VASCULAR SURGERY)

## 2023-09-21 PROCEDURE — 71250 CT THORAX DX C-: CPT | Mod: 26,,, | Performed by: RADIOLOGY

## 2023-09-21 PROCEDURE — 1159F PR MEDICATION LIST DOCUMENTED IN MEDICAL RECORD: ICD-10-PCS | Mod: CPTII,S$GLB,, | Performed by: THORACIC SURGERY (CARDIOTHORACIC VASCULAR SURGERY)

## 2023-09-21 PROCEDURE — 99213 PR OFFICE/OUTPT VISIT, EST, LEVL III, 20-29 MIN: ICD-10-PCS | Mod: S$GLB,,, | Performed by: THORACIC SURGERY (CARDIOTHORACIC VASCULAR SURGERY)

## 2023-09-21 PROCEDURE — 1159F MED LIST DOCD IN RCRD: CPT | Mod: CPTII,S$GLB,, | Performed by: THORACIC SURGERY (CARDIOTHORACIC VASCULAR SURGERY)

## 2023-09-21 PROCEDURE — 3074F SYST BP LT 130 MM HG: CPT | Mod: CPTII,S$GLB,, | Performed by: THORACIC SURGERY (CARDIOTHORACIC VASCULAR SURGERY)

## 2023-09-21 RX ORDER — NICOTINE POLACRILEX 2 MG
MINI LOZENGE BUCCAL
COMMUNITY
Start: 2023-05-03

## 2023-09-21 NOTE — PROGRESS NOTES
Subjective:      Patient ID: Ross Rodriguez is a 78 y.o. female.    Chief Complaint: No chief complaint on file.      HPI:  Ross Rodriguez is a 78 y.o. female who presents as a follow up for TAA. She has a medical history significant for hyperlipidemia, known TAA, and GERD. During her last clinic visit, she was noted to have a stable TAA measuring 4.8cm.  She presents today report she is doing well and has not complaints.       Current Outpatient Medications:     acetaminophen (TYLENOL) 500 mg Cap, , Disp: , Rfl:     calcium citrate-vitamin D2 1,500-200 mg-unit Tab, , Disp: , Rfl:     denosumab (PROLIA) 60 mg/mL Syrg, Inject 60 mg into the skin every 6 (six) months., Disp: , Rfl:     fish oil-omega-3 fatty acids 300-1,000 mg capsule, Take 2 g by mouth once daily. Takes 2 times daily, Disp: , Rfl:     FLAXSEED ORAL, Take 1 application by mouth once daily at 6am. Add seeds to oatmeal, Disp: , Rfl:     gabapentin (NEURONTIN) 300 MG capsule, Take 1 capsule (300 mg total) by mouth every evening., Disp: 30 capsule, Rfl: 11    hydrOXYzine HCL (ATARAX) 25 MG tablet, Take 1 tablet (25 mg total) by mouth daily as needed for Anxiety., Disp: 90 tablet, Rfl: 1    LORazepam (ATIVAN) 0.5 MG tablet, Take 1 tablet (0.5 mg total) by mouth as needed for Anxiety (May take 1-2 x weekly for anxiety or for travel). (Patient not taking: Reported on 4/25/2023), Disp: 30 tablet, Rfl: 0    metoprolol succinate (TOPROL-XL) 25 MG 24 hr tablet, Take 1 tablet (25 mg total) by mouth once daily., Disp: 90 tablet, Rfl: 3    multivitamin (THERAGRAN) per tablet, Take 1 tablet by mouth once daily., Disp: , Rfl:     rosuvastatin (CRESTOR) 10 MG tablet, Take 1 tablet (10 mg total) by mouth once daily., Disp: 90 tablet, Rfl: 3  Current medications Reviewed    Review of Systems   Constitutional:  Negative for activity change, appetite change, fatigue and fever.   HENT:  Negative for nosebleeds.    Respiratory:  Negative for cough and shortness of breath.     Cardiovascular:  Negative for chest pain, palpitations and leg swelling.   Gastrointestinal:  Negative for abdominal distention, abdominal pain and nausea.   Genitourinary:  Negative for frequency.   Musculoskeletal:  Negative for arthralgias and myalgias.   Skin:  Negative for rash.   Neurological:  Negative for dizziness and numbness.   Hematological:  Does not bruise/bleed easily.     Objective:   Physical Exam  HENT:      Head: Normocephalic and atraumatic.   Eyes:      Extraocular Movements: Extraocular movements intact.   Cardiovascular:      Rate and Rhythm: Normal rate and regular rhythm.   Pulmonary:      Effort: Pulmonary effort is normal.   Abdominal:      General: Abdomen is flat.      Palpations: Abdomen is soft.   Musculoskeletal:         General: Normal range of motion.      Cervical back: Normal range of motion.   Skin:     General: Skin is warm and dry.      Capillary Refill: Capillary refill takes less than 2 seconds.   Neurological:      General: No focal deficit present.       Diagnotic Results: Reviewed  CT Chest:   Images Reviewed - Stable    CT Chest: 3/14/2023  1. No significant interval change.  Stable fusiform aneurysmal dilatation of the ascending and arch of the aorta.  Findings likely stable allowing for differences in technique and for inter-and intra-observer variability.  2. Mild mid-lower lung zones fibrotic changes.  3.  Other findings are as above.  Assessment:   TAA  Plan:     CTS Attending Note:    I have personally taken the history and examined this patient and agree with the NISH's note as stated above.  Very pleasant 78-year-old woman whom I have followed for some time for ascending aortic aneurysm.  I reviewed today's CT scan.  Although the official report is not yet available, her ascending aorta remains 4.8 cm by my measurement at the level of the right pulmonary artery.  This represents interval stability since the previous study.  I discussed with her that given this  stability we could continue with six-month evaluations or move to 1 year.  She indicated she would like to be seen back in 1 year.  We will see her at that time with another CT scan.

## 2023-09-26 ENCOUNTER — TELEPHONE (OUTPATIENT)
Dept: DERMATOLOGY | Facility: CLINIC | Age: 78
End: 2023-09-26
Payer: MEDICARE

## 2023-09-26 NOTE — TELEPHONE ENCOUNTER
"Spoke with pt regarding getting sched for an appt. Informed pt that Dr. Olmos is booked until January, offered to get pt sched with another provider for something sooner. Pt declined, Stated "it's no asencio. I've seen Dr. Olmos in the past and are more comfortable seeing her" informed pt that our  schedule will open  for booking. Pt verb understanding. Pt states she will call back Oct 1st to get sched for an appt in January.    ----- Message from Karen Rodriguez sent at 2023 11:58 AM CDT -----  Regarding: no availability  Contact: pt @[phone]  Pt called in to schedule an appt; no available appts in Epic. Pt is asking for a call back soon to schedule. Thanks.         Reason appt not schedule: no availability         Patient's DX:A flat mole on pt's nose         Patient requesting call back or [facility] [phone]    "

## 2023-10-09 ENCOUNTER — TELEPHONE (OUTPATIENT)
Dept: DERMATOLOGY | Facility: CLINIC | Age: 78
End: 2023-10-09
Payer: MEDICARE

## 2023-10-09 NOTE — TELEPHONE ENCOUNTER
Spoke to pt. Pt verbally agreed and confirmed date and time given. Pt thanked me.     ----- Message from Nettie Dimas MA sent at 10/2/2023  4:04 PM CDT -----  Regarding: FW: appt  Contact: [phone]    ----- Message -----  From: Haleigh Aponte  Sent: 10/2/2023  11:13 AM CDT  To: [facility] Staff  Subject: appt                                             Pt is calling to schedule an appt with the provider . No available dates. Please call

## 2023-10-09 NOTE — TELEPHONE ENCOUNTER
----- Message from Melissa Weathers LPN sent at 10/5/2023  3:45 PM CDT -----  Regarding: FW: pt advise  Contact: pt    ----- Message -----  From: Augusta Brunner  Sent: 10/5/2023  10:39 AM CDT  To: [facility] Staff  Subject: pt advise                                        Type: Appointment Request    Caller is requesting an appointment     Name of Caller: Pt  Reason for appointment:  Moles  Would the patient rather a call back or a response via [facility]? Call back  Best Call Back Number:  [phone], [phone]  Additional Information: Pt would like a call back,.. please call to advise

## 2023-10-30 ENCOUNTER — PATIENT MESSAGE (OUTPATIENT)
Dept: INTERNAL MEDICINE | Facility: CLINIC | Age: 78
End: 2023-10-30
Payer: MEDICARE

## 2023-10-30 DIAGNOSIS — R53.83 FATIGUE, UNSPECIFIED TYPE: ICD-10-CM

## 2023-10-30 DIAGNOSIS — M81.0 AGE-RELATED OSTEOPOROSIS WITHOUT CURRENT PATHOLOGICAL FRACTURE: ICD-10-CM

## 2023-10-30 DIAGNOSIS — E78.2 MIXED HYPERLIPIDEMIA: Primary | ICD-10-CM

## 2023-10-30 DIAGNOSIS — R73.01 IFG (IMPAIRED FASTING GLUCOSE): ICD-10-CM

## 2023-10-30 DIAGNOSIS — Z12.31 SCREENING MAMMOGRAM, ENCOUNTER FOR: ICD-10-CM

## 2023-10-31 ENCOUNTER — PATIENT MESSAGE (OUTPATIENT)
Dept: INTERNAL MEDICINE | Facility: CLINIC | Age: 78
End: 2023-10-31
Payer: MEDICARE

## 2023-11-08 ENCOUNTER — OFFICE VISIT (OUTPATIENT)
Dept: PAIN MEDICINE | Facility: CLINIC | Age: 78
End: 2023-11-08
Payer: MEDICARE

## 2023-11-08 ENCOUNTER — LAB VISIT (OUTPATIENT)
Dept: LAB | Facility: HOSPITAL | Age: 78
End: 2023-11-08
Attending: INTERNAL MEDICINE
Payer: MEDICARE

## 2023-11-08 VITALS
SYSTOLIC BLOOD PRESSURE: 127 MMHG | HEIGHT: 60 IN | DIASTOLIC BLOOD PRESSURE: 65 MMHG | WEIGHT: 162.94 LBS | BODY MASS INDEX: 31.99 KG/M2 | HEART RATE: 61 BPM

## 2023-11-08 DIAGNOSIS — M47.816 LUMBAR SPONDYLOSIS: Primary | ICD-10-CM

## 2023-11-08 DIAGNOSIS — R73.01 IFG (IMPAIRED FASTING GLUCOSE): ICD-10-CM

## 2023-11-08 DIAGNOSIS — R53.83 FATIGUE, UNSPECIFIED TYPE: ICD-10-CM

## 2023-11-08 DIAGNOSIS — M70.61 GREATER TROCHANTERIC BURSITIS OF RIGHT HIP: ICD-10-CM

## 2023-11-08 DIAGNOSIS — M54.16 LUMBAR RADICULOPATHY: ICD-10-CM

## 2023-11-08 DIAGNOSIS — E78.2 MIXED HYPERLIPIDEMIA: ICD-10-CM

## 2023-11-08 LAB
ALBUMIN SERPL BCP-MCNC: 4.5 G/DL (ref 3.5–5.2)
ALP SERPL-CCNC: 42 U/L (ref 38–126)
ALT SERPL W/O P-5'-P-CCNC: 23 U/L (ref 10–44)
ANION GAP SERPL CALC-SCNC: 10 MMOL/L (ref 8–16)
AST SERPL-CCNC: 26 U/L (ref 15–46)
BASOPHILS # BLD AUTO: 0.03 K/UL (ref 0–0.2)
BASOPHILS NFR BLD: 0.6 % (ref 0–1.9)
BILIRUB SERPL-MCNC: 0.7 MG/DL (ref 0.1–1)
CALCIUM SERPL-MCNC: 9.5 MG/DL (ref 8.7–10.5)
CHLORIDE SERPL-SCNC: 104 MMOL/L (ref 95–110)
CHOLEST SERPL-MCNC: 131 MG/DL (ref 120–199)
CHOLEST/HDLC SERPL: 2.5 {RATIO} (ref 2–5)
CO2 SERPL-SCNC: 29 MMOL/L (ref 23–29)
CREAT SERPL-MCNC: 0.79 MG/DL (ref 0.5–1.4)
DIFFERENTIAL METHOD: NORMAL
EOSINOPHIL # BLD AUTO: 0.2 K/UL (ref 0–0.5)
EOSINOPHIL NFR BLD: 2.8 % (ref 0–8)
ERYTHROCYTE [DISTWIDTH] IN BLOOD BY AUTOMATED COUNT: 13.2 % (ref 11.5–14.5)
EST. GFR  (NO RACE VARIABLE): >60 ML/MIN/1.73 M^2
ESTIMATED AVG GLUCOSE: 108 MG/DL (ref 68–131)
GLUCOSE SERPL-MCNC: 103 MG/DL (ref 70–110)
HBA1C MFR BLD: 5.4 % (ref 4–5.6)
HCT VFR BLD AUTO: 37.1 % (ref 37–48.5)
HDLC SERPL-MCNC: 53 MG/DL (ref 40–75)
HDLC SERPL: 40.5 % (ref 20–50)
HGB BLD-MCNC: 12.2 G/DL (ref 12–16)
IMM GRANULOCYTES # BLD AUTO: 0.01 K/UL (ref 0–0.04)
IMM GRANULOCYTES NFR BLD AUTO: 0.2 % (ref 0–0.5)
LDLC SERPL CALC-MCNC: 60.2 MG/DL (ref 63–159)
LYMPHOCYTES # BLD AUTO: 1.9 K/UL (ref 1–4.8)
LYMPHOCYTES NFR BLD: 36 % (ref 18–48)
MCH RBC QN AUTO: 29.3 PG (ref 27–31)
MCHC RBC AUTO-ENTMCNC: 32.9 G/DL (ref 32–36)
MCV RBC AUTO: 89 FL (ref 82–98)
MONOCYTES # BLD AUTO: 0.4 K/UL (ref 0.3–1)
MONOCYTES NFR BLD: 7.7 % (ref 4–15)
NEUTROPHILS # BLD AUTO: 2.8 K/UL (ref 1.8–7.7)
NEUTROPHILS NFR BLD: 52.7 % (ref 38–73)
NONHDLC SERPL-MCNC: 78 MG/DL
NRBC BLD-RTO: 0 /100 WBC
PLATELET # BLD AUTO: 188 K/UL (ref 150–450)
PMV BLD AUTO: 10.2 FL (ref 9.2–12.9)
POTASSIUM SERPL-SCNC: 4.3 MMOL/L (ref 3.5–5.1)
PROT SERPL-MCNC: 7.3 G/DL (ref 6–8.4)
RBC # BLD AUTO: 4.16 M/UL (ref 4–5.4)
SODIUM SERPL-SCNC: 143 MMOL/L (ref 136–145)
TRIGL SERPL-MCNC: 89 MG/DL (ref 30–150)
TSH SERPL DL<=0.005 MIU/L-ACNC: 1.96 UIU/ML (ref 0.4–4)
UUN UR-MCNC: 14 MG/DL (ref 7–17)
WBC # BLD AUTO: 5.33 K/UL (ref 3.9–12.7)

## 2023-11-08 PROCEDURE — 36415 COLL VENOUS BLD VENIPUNCTURE: CPT | Mod: PN | Performed by: INTERNAL MEDICINE

## 2023-11-08 PROCEDURE — 3078F DIAST BP <80 MM HG: CPT | Mod: CPTII,S$GLB,, | Performed by: STUDENT IN AN ORGANIZED HEALTH CARE EDUCATION/TRAINING PROGRAM

## 2023-11-08 PROCEDURE — 1125F AMNT PAIN NOTED PAIN PRSNT: CPT | Mod: CPTII,S$GLB,, | Performed by: STUDENT IN AN ORGANIZED HEALTH CARE EDUCATION/TRAINING PROGRAM

## 2023-11-08 PROCEDURE — 99999 PR PBB SHADOW E&M-EST. PATIENT-LVL III: CPT | Mod: PBBFAC,,, | Performed by: STUDENT IN AN ORGANIZED HEALTH CARE EDUCATION/TRAINING PROGRAM

## 2023-11-08 PROCEDURE — 99214 OFFICE O/P EST MOD 30 MIN: CPT | Mod: S$GLB,,, | Performed by: STUDENT IN AN ORGANIZED HEALTH CARE EDUCATION/TRAINING PROGRAM

## 2023-11-08 PROCEDURE — 3288F FALL RISK ASSESSMENT DOCD: CPT | Mod: CPTII,S$GLB,, | Performed by: STUDENT IN AN ORGANIZED HEALTH CARE EDUCATION/TRAINING PROGRAM

## 2023-11-08 PROCEDURE — 1159F PR MEDICATION LIST DOCUMENTED IN MEDICAL RECORD: ICD-10-PCS | Mod: CPTII,S$GLB,, | Performed by: STUDENT IN AN ORGANIZED HEALTH CARE EDUCATION/TRAINING PROGRAM

## 2023-11-08 PROCEDURE — 80061 LIPID PANEL: CPT | Performed by: INTERNAL MEDICINE

## 2023-11-08 PROCEDURE — 85025 COMPLETE CBC W/AUTO DIFF WBC: CPT | Mod: PN | Performed by: INTERNAL MEDICINE

## 2023-11-08 PROCEDURE — 3078F PR MOST RECENT DIASTOLIC BLOOD PRESSURE < 80 MM HG: ICD-10-PCS | Mod: CPTII,S$GLB,, | Performed by: STUDENT IN AN ORGANIZED HEALTH CARE EDUCATION/TRAINING PROGRAM

## 2023-11-08 PROCEDURE — 1125F PR PAIN SEVERITY QUANTIFIED, PAIN PRESENT: ICD-10-PCS | Mod: CPTII,S$GLB,, | Performed by: STUDENT IN AN ORGANIZED HEALTH CARE EDUCATION/TRAINING PROGRAM

## 2023-11-08 PROCEDURE — 3074F SYST BP LT 130 MM HG: CPT | Mod: CPTII,S$GLB,, | Performed by: STUDENT IN AN ORGANIZED HEALTH CARE EDUCATION/TRAINING PROGRAM

## 2023-11-08 PROCEDURE — 84443 ASSAY THYROID STIM HORMONE: CPT | Mod: PN | Performed by: INTERNAL MEDICINE

## 2023-11-08 PROCEDURE — 3288F PR FALLS RISK ASSESSMENT DOCUMENTED: ICD-10-PCS | Mod: CPTII,S$GLB,, | Performed by: STUDENT IN AN ORGANIZED HEALTH CARE EDUCATION/TRAINING PROGRAM

## 2023-11-08 PROCEDURE — 80053 COMPREHEN METABOLIC PANEL: CPT | Mod: PN | Performed by: INTERNAL MEDICINE

## 2023-11-08 PROCEDURE — 1101F PR PT FALLS ASSESS DOC 0-1 FALLS W/OUT INJ PAST YR: ICD-10-PCS | Mod: CPTII,S$GLB,, | Performed by: STUDENT IN AN ORGANIZED HEALTH CARE EDUCATION/TRAINING PROGRAM

## 2023-11-08 PROCEDURE — 99999 PR PBB SHADOW E&M-EST. PATIENT-LVL III: ICD-10-PCS | Mod: PBBFAC,,, | Performed by: STUDENT IN AN ORGANIZED HEALTH CARE EDUCATION/TRAINING PROGRAM

## 2023-11-08 PROCEDURE — 99214 PR OFFICE/OUTPT VISIT, EST, LEVL IV, 30-39 MIN: ICD-10-PCS | Mod: S$GLB,,, | Performed by: STUDENT IN AN ORGANIZED HEALTH CARE EDUCATION/TRAINING PROGRAM

## 2023-11-08 PROCEDURE — 83036 HEMOGLOBIN GLYCOSYLATED A1C: CPT | Performed by: INTERNAL MEDICINE

## 2023-11-08 PROCEDURE — 3074F PR MOST RECENT SYSTOLIC BLOOD PRESSURE < 130 MM HG: ICD-10-PCS | Mod: CPTII,S$GLB,, | Performed by: STUDENT IN AN ORGANIZED HEALTH CARE EDUCATION/TRAINING PROGRAM

## 2023-11-08 PROCEDURE — 1101F PT FALLS ASSESS-DOCD LE1/YR: CPT | Mod: CPTII,S$GLB,, | Performed by: STUDENT IN AN ORGANIZED HEALTH CARE EDUCATION/TRAINING PROGRAM

## 2023-11-08 PROCEDURE — 1159F MED LIST DOCD IN RCRD: CPT | Mod: CPTII,S$GLB,, | Performed by: STUDENT IN AN ORGANIZED HEALTH CARE EDUCATION/TRAINING PROGRAM

## 2023-11-08 NOTE — PROGRESS NOTES
Chronic Pain - New Consult    Referring Physician: No ref. provider found    Date: 11/08/2023     Re: Ross Rodriguez  MR#: [id number]  YOB: 1945  Age: 78 y.o.    Chief Complaint: back pain  No chief complaint on file.    **This note is dictated using the M*Modal Fluency Direct word recognition program. There are word recognition mistakes that are occasionally missed on review.**    ASSESSMENT: 78 y.o. year old female with back pain, consistent with     1. Lumbar spondylosis        2. Lumbar radiculopathy        3. Greater trochanteric bursitis of right hip          PLAN:     Lumbar radiculopathy  -better with gabapentin 300mg qhs. Continue  -no intervention indicated at this time.    Lumbar spondylosis - mild but tolerable  -discussed MBB/RFA.  Defer for now.  -reviewed MRI with patient. Facet arthropathy worse on the Right in the L3-S1 and on the left at the L2-3.  -continue HEP    Right trochanteric bursitis  7/24/23 - s/p right GTB with significant improvement -  R GTB w/ 50-70% @4m  -cannot have NSAIDs    - RTC PRN  - Counseled patient regarding the importance of weight loss and activity modification and physical therapy.    The above plan and management options were discussed at length with patient. Patient is in agreement with the above and verbalized understanding. It will be communicated with the referring physician via electronic record, fax, or mail.  Lab/study reports reviewed were important and necessary because subsequent medical and treatment recommendations required review of the above lab/study reports. Images viewed/reviewed above were important and necessary because subsequent medical and treatment recommendations required review of the reviewed image(s).     Electronically signed by:  Brian Malhotra DO  11/08/2023    =========================================================================================================    SUBJECTIVE:    Interval History 11/8/2023:   Ross TONEY  "Michael is a 78 y.o. female presents to the clinic for follow up.  Since last visit the pain has has slightly improved.    The pain is located in the low back area and radiates to the to hips and buttock.  The pain is described as dull, shooting, and throbbing    At BEST  1/10   At WORST  7/10 on the WORST day.    On average pain is rated as 4/10.   Today the pain is rated as 3/10  Symptoms interfere with daily activity.   Exacerbating factors: Sitting, Standing, and Walking.    Mitigating factors medications.     Current pain medications: - gabapentin 300mg qhs (helpful),  tylenol 500mg (1-2 pills at a time), lidocaine, TENS, heat/ice  Failed Pain Medications: tylenol, methocarbamol 250mg qhs prn    Pain procedures:  7/24/23 - R GTB    Initial hx:  Ross Rodriguez is a 78 y.o. female presents to the clinic for the evaluation of lower back pain. The pain started 30 years ago following no inciting event and symptoms have been worsening.  The patient has low back pain which has been ongoing for many years.  But her biggest issue is that she has had right sided lateral hip pain which has been ongoing for about 1 year.  It is worse with pressure and laying on the right side.  She completed PT which was not helpful.  This all started after a long bus trip to [place], [place].  The patient states that things were really bad until about June when something just "happened" and it started feeling better.    Pain Description:    The pain is located in the lower back area and radiates to the to hips and buttock .    At BEST  1/10   At WORST  8/10 on the WORST day.    On average pain is rated as 5/10.   Today the pain is rated as 2/10  The pain is continuous.  The pain is described as aching, pulsating, shooting, stabbing, and tingling    Symptoms interfere with daily activity.   Exacerbating factors: daily activity.    Mitigating factors heat, ice, medications, and rest.   She reports 7 hours of sleep per night.    Physical " Therapy/Home Exercise: Yes, currently in Physical therapy    Current Pain Medications:    - gabapentin 300mg qhs (helpful), methocarbamol 250mg qhs prn, tylenol 500mg (1-2 pills at a time), lidocaine, TENS, heat/ice    Failed Pain Medications:    - tylenol    Pain Treatment Therapies:    Pain procedures:   Bursitis injections  Physical Therapy: yes for back pain  Chiropractor: none  Acupuncture: none  TENS unit: yes  Spinal decompression: none  Joint replacement: none    Patient denies urinary incontinence, bowel incontinence, significant motor weakness, and loss of sensations.  Patient denies any suicidal or homicidal ideations     report:  Reviewed and consistent with medication use as prescribed.    Imaging:   Lumbar MRI 06/2023:     Degenerative findings:     C7-T1: No spinal canal stenosis or neural foraminal narrowing.     T1-T12: Mild circumferential disc bulges at T10-T11 and T11-T12.  No spinal canal stenosis or neural foraminal narrowing.     T12-L1: Small right central disc protrusion.  No spinal canal stenosis or neural foraminal narrowing.     L1-L2: Posterior annular fissure with small central disc extrusion.  Mild ligamentum flavum hypertrophy.  No spinal canal stenosis or neural foraminal narrowing.     L2-L3: Circumferential disc bulge.  Mild bilateral facet hypertrophy and ligamentum flavum thickening.  No neural foraminal narrowing.  Mild spinal canal stenosis.     L3-L4: Circumferential disc bulge.  Severe right and moderate left facet hypertrophy and ligamentum flavum thickening.  No spinal canal stenosis or neural foraminal narrowing.     L4-L5: Severe right and mild left facet hypertrophy.  Bilateral ligament flavum hypertrophy.  No spinal canal stenosis or neural foraminal narrowing.     L5-S1: Moderate bilateral facet hypertrophy and ligamentum flavum thickening.  No spinal canal stenosis or neural foraminal narrowing.     Paraspinal muscles & soft tissues: Mild edema within the deep  subcutaneous tissues surrounding the right L4-L5 facet joint.     Impression:     1. Multilevel lumbar spondylosis contributing to mild spinal canal stenosis at L2-L3.  Note made of severe facet arthropathy at L3-L5.  2. No spinal canal stenosis or neural foraminal narrowing within the thoracic spine.    Hip XR 05/2023:  FINDINGS:  Osseous mineralization is preserved.  No acute displaced fractures.  No suspicious lytic or blastic lesions.  Cartilage spaces are preserved.  No subluxation or dislocation.  SI joints are symmetric.  Pubic symphysis is unremarkable.  Mild degenerative changes of the visualized lower lumbar spine.        11/8/2023    11:15 AM 7/24/2023    11:14 AM   Pain Disability Index (PDI)   Family/Home Responsibilities: 4 5   Recreation: 4 5   Occupation: 4 5   Sexual Behavior: 4 5   Self Care: 4 5   Life-Support Activities: 4 5   Pain Disability Index (PDI) 28 35        Past Medical History:   Diagnosis Date    Allergy     Anxiety     Aortic atherosclerosis: see CT scan 7/18 8/21/2018    Arthritis     Cataract     Depression     Diverticulosis 7/21/2015    GERD (gastroesophageal reflux disease)     Hyperlipidemia     Joint pain     Liver cyst: see CT 2009 6/21/2016    Lower back pain 7/29/2019    Lung cyst: R side see CT 2009 6/21/2016    Nuclear sclerosis - Both Eyes 2/14/2014    Osteoporosis     Primary insomnia 6/21/2016    Primary insomnia 6/21/2016     Past Surgical History:   Procedure Laterality Date    CHOLECYSTECTOMY      DILATION AND CURETTAGE OF UTERUS      WISDOM TOOTH EXTRACTION       Social History     Socioeconomic History    Marital status:     Number of children: 1   Occupational History    Occupation: retired    Tobacco Use    Smoking status: Never    Smokeless tobacco: Never   Substance and Sexual Activity    Alcohol use: Yes     Comment: occasional, 1 drink every 3 months    Drug use: No    Sexual activity: Not Currently     Birth control/protection: Post-menopausal      Social Determinants of Health     Financial Resource Strain: Low Risk  (9/17/2023)    Overall Financial Resource Strain (CARDIA)     Difficulty of Paying Living Expenses: Not hard at all   Food Insecurity: No Food Insecurity (9/17/2023)    Hunger Vital Sign     Worried About Running Out of Food in the Last Year: Never true     Ran Out of Food in the Last Year: Never true   Transportation Needs: No Transportation Needs (9/17/2023)    PRAPARE - Transportation     Lack of Transportation (Medical): No     Lack of Transportation (Non-Medical): No   Physical Activity: Insufficiently Active (9/17/2023)    Exercise Vital Sign     Days of Exercise per Week: 3 days     Minutes of Exercise per Session: 20 min   Stress: No Stress Concern Present (9/17/2023)    [employer] - Occupational Stress Questionnaire     Feeling of Stress : Not at all   Social Connections: Moderately Integrated (9/17/2023)    Social Connection and Isolation Panel [NHANES]     Frequency of Communication with Friends and Family: More than three times a week     Frequency of Social Gatherings with Friends and Family: Three times a week     Attends Yazdanism Services: More than 4 times per year     Active Member of Clubs or Organizations: Yes     Attends Club or Organization Meetings: More than 4 times per year     Marital Status:    Housing Stability: Low Risk  (9/17/2023)    Housing Stability Vital Sign     Unable to Pay for Housing in the Last Year: No     Number of Places Lived in the Last Year: 1     Unstable Housing in the Last Year: No     Family History   Problem Relation Age of Onset    Stroke Mother     Heart disease Mother         rheumatic fever    Cancer Father         Bladder    COPD Father     Heart disease Father         CHF    Kidney disease Father     Diabetes Father     Hearing loss Father     No Known Problems Son     Skin cancer Paternal Aunt     Thrombosis Maternal Grandfather     Cancer Paternal  Grandmother         stomach    Pickaway's disease Paternal Grandfather     Melanoma Neg Hx     Amblyopia Neg Hx     Blindness Neg Hx     Cataracts Neg Hx     Glaucoma Neg Hx     Hypertension Neg Hx     Macular degeneration Neg Hx     Retinal detachment Neg Hx     Strabismus Neg Hx     Thyroid disease Neg Hx        Review of patient's allergies indicates:   Allergen Reactions    Fosamax [alendronate] Nausea Only    Nsaids (non-steroidal anti-inflammatory drug) Hives    Sulfa (sulfonamide antibiotics) Hives       Current Outpatient Medications   Medication Sig    acetaminophen (TYLENOL) 500 mg Cap     calcium citrate-vitamin D2 1,500-200 mg-unit Tab     denosumab (PROLIA) 60 mg/mL Syrg Inject 60 mg into the skin every 6 (six) months.    fish oil-omega-3 fatty acids 300-1,000 mg capsule Take 2 g by mouth once daily. Takes 2 times daily    FLAXSEED ORAL Take 1 application by mouth once daily at 6am. Add seeds to oatmeal    gabapentin (NEURONTIN) 300 MG capsule Take 1 capsule (300 mg total) by mouth every evening.    hydrOXYzine HCL (ATARAX) 25 MG tablet Take 1 tablet (25 mg total) by mouth daily as needed for Anxiety.    lactobacillus combo no.11 (PROBIOTIC) 15 billion cell CpSP     metoprolol succinate (TOPROL-XL) 25 MG 24 hr tablet Take 1 tablet (25 mg total) by mouth once daily.    multivitamin (THERAGRAN) per tablet Take 1 tablet by mouth once daily.    rosuvastatin (CRESTOR) 10 MG tablet Take 1 tablet (10 mg total) by mouth once daily.    LORazepam (ATIVAN) 0.5 MG tablet Take 1 tablet (0.5 mg total) by mouth as needed for Anxiety (May take 1-2 x weekly for anxiety or for travel). (Patient not taking: Reported on 4/25/2023)     No current facility-administered medications for this visit.       REVIEW OF SYSTEMS:    GENERAL:  No weight loss, malaise or fevers.  HEENT:   No recent changes in vision or hearing  NECK:  Negative for lumps, no difficulty with swallowing.  RESPIRATORY:  Negative for cough, wheezing or  shortness of breath, patient denies any recent URI.  CARDIOVASCULAR:  Negative for chest pain, leg swelling or palpitations.  GI:  Negative for abdominal discomfort, blood in stools or black stools or change in bowel habits.  MUSCULOSKELETAL:  See HPI.  SKIN:  Negative for lesions, rash, and itching.  PSYCH:  No mood disorder or recent psychosocial stressors.  Patients sleep is not disturbed secondary to pain.  HEMATOLOGY/LYMPHOLOGY:  Negative for prolonged bleeding, bruising easily or swollen nodes.  Patient is not currently taking any anti-coagulants  NEURO:   No history of headaches, syncope, paralysis, seizures or tremors.  All other reviewed and negative other than HPI.    OBJECTIVE:    /65 (BP Location: Left arm, Patient Position: Sitting)   Pulse 61   Ht 5' (1.524 m)   Wt 73.9 kg (162 lb 14.7 oz)   BMI 31.82 kg/m²     PHYSICAL EXAMINATION:    GENERAL: Well appearing, in no acute distress, alert and oriented x3.  PSYCH:  Mood and affect appropriate.  SKIN: Skin color, texture, turgor normal, no rashes or lesions.  HEAD/FACE:  Normocephalic, atraumatic. Cranial nerves grossly intact.  CV: RRR with palpation of the radial artery.  PULM: CTAB. No evidence of respiratory difficulty, symmetric chest rise.  GI:  Soft and non-tender.    BACK:   - No obvious deformity or signs of trauma, Normal lumbar lordotic curve  - Negative spinous process tenderness  - Negative paravertebral tenderness  - Negative pain to palpation over the facet joints of the lumbar spine.   - Negative QL / Iliac crest / Glut tenderness  - Slump test is Negative for radicular pain  - Slump test is Negative for back pain  - Supine Straight leg raising is Negative for radicular pain  - Supine Straight leg raising is Negative for back pain  - Lumbar ROM is normal in Flexion without pain  - Lumbar ROM is normal in Extension without pain  - Lumbar ROM is normal in Lateral Flexion without pain  - Lumbar ROM is normal in Rotation without  pain  - Negative Sustained Hip Flexion test (for discogenic pain)  - Negative Altered Gait, Posture  - Axial facet loading test Positive on the right side(s)    SI Joint exam:  - Negative SI joint tenderness to palpation  - Armando's sign Negative  - Yeoman's Test: Did not perform for SI joint pain indicating anterior SI ligament involvement. Did not perform for anterior thigh pain/paresthesia which indicates femoral nerve stretch.  - Gaenslen's Test:Negative  - Finger Jade's Sign:Negative  - SI compression test:Did not perform  - SI distraction test:Negative  - Thigh Thrust: Negative  - SI Thrust: Did not perform    MUSKULOSKELETAL:    EXTREMITIES:   Hip Exam:  - Log Roll Negative  - FADIR Negative  - Stinchfield Negative  - Hip Scour Negative  - GTB Tenderness mild Positive    MUSCULOSKELETAL:  No atrophy or tone abnormalities are noted in the UE or LE.  No deformities, edema, or skin discoloration are noted on visible skin. Good capillary refill.    NEURO: Bilateral upper and lower extremity coordination and muscle stretch reflexes are physiologic and symmetric.      NEUROLOGICAL EXAM:  MENTAL STATUS: A x O x 3, good concentration, speech is fluent and goal directed  MEMORY: recent and remote are intact  CN: CN2-12 grossly intact  MOTOR: 5/5 in all muscle groups  DTRs: 2+ intact symmetric  Sensation:    -no Loss of sensation in a left lower and right lower L-1, L-2, L-3, L-4, and L-5 bilaterally distribution.  Babinski: absent on the bilateral side(s)  Head: absent on the bilateral side(s)  Clonus: absent on the bilateral side(s)    GAIT: normal.

## 2023-11-14 ENCOUNTER — OFFICE VISIT (OUTPATIENT)
Dept: INTERNAL MEDICINE | Facility: CLINIC | Age: 78
End: 2023-11-14
Payer: MEDICARE

## 2023-11-14 VITALS
HEIGHT: 60 IN | BODY MASS INDEX: 31.41 KG/M2 | WEIGHT: 160 LBS | DIASTOLIC BLOOD PRESSURE: 70 MMHG | SYSTOLIC BLOOD PRESSURE: 118 MMHG

## 2023-11-14 DIAGNOSIS — J84.9 ILD (INTERSTITIAL LUNG DISEASE): ICD-10-CM

## 2023-11-14 DIAGNOSIS — E78.2 MIXED HYPERLIPIDEMIA: ICD-10-CM

## 2023-11-14 DIAGNOSIS — M81.0 AGE-RELATED OSTEOPOROSIS WITHOUT CURRENT PATHOLOGICAL FRACTURE: ICD-10-CM

## 2023-11-14 DIAGNOSIS — I71.21 ANEURYSM OF ASCENDING AORTA WITHOUT RUPTURE: ICD-10-CM

## 2023-11-14 DIAGNOSIS — R91.8 MULTIPLE LUNG NODULES ON CT: ICD-10-CM

## 2023-11-14 DIAGNOSIS — I70.0 AORTIC ATHEROSCLEROSIS: ICD-10-CM

## 2023-11-14 DIAGNOSIS — M54.9 BILATERAL BACK PAIN, UNSPECIFIED BACK LOCATION, UNSPECIFIED CHRONICITY: ICD-10-CM

## 2023-11-14 DIAGNOSIS — Z00.00 ANNUAL PHYSICAL EXAM: Primary | ICD-10-CM

## 2023-11-14 PROBLEM — R29.898 WEAKNESS OF BOTH HIPS: Status: RESOLVED | Noted: 2022-12-19 | Resolved: 2023-11-14

## 2023-11-14 PROCEDURE — 99999 PR PBB SHADOW E&M-EST. PATIENT-LVL III: ICD-10-PCS | Mod: PBBFAC,,, | Performed by: INTERNAL MEDICINE

## 2023-11-14 PROCEDURE — 1101F PT FALLS ASSESS-DOCD LE1/YR: CPT | Mod: CPTII,S$GLB,, | Performed by: INTERNAL MEDICINE

## 2023-11-14 PROCEDURE — 3074F SYST BP LT 130 MM HG: CPT | Mod: CPTII,S$GLB,, | Performed by: INTERNAL MEDICINE

## 2023-11-14 PROCEDURE — 99397 PR PREVENTIVE VISIT,EST,65 & OVER: ICD-10-PCS | Mod: GZ,S$GLB,, | Performed by: INTERNAL MEDICINE

## 2023-11-14 PROCEDURE — 1101F PR PT FALLS ASSESS DOC 0-1 FALLS W/OUT INJ PAST YR: ICD-10-PCS | Mod: CPTII,S$GLB,, | Performed by: INTERNAL MEDICINE

## 2023-11-14 PROCEDURE — 3078F PR MOST RECENT DIASTOLIC BLOOD PRESSURE < 80 MM HG: ICD-10-PCS | Mod: CPTII,S$GLB,, | Performed by: INTERNAL MEDICINE

## 2023-11-14 PROCEDURE — 1125F AMNT PAIN NOTED PAIN PRSNT: CPT | Mod: CPTII,S$GLB,, | Performed by: INTERNAL MEDICINE

## 2023-11-14 PROCEDURE — 99999 PR PBB SHADOW E&M-EST. PATIENT-LVL III: CPT | Mod: PBBFAC,,, | Performed by: INTERNAL MEDICINE

## 2023-11-14 PROCEDURE — 3078F DIAST BP <80 MM HG: CPT | Mod: CPTII,S$GLB,, | Performed by: INTERNAL MEDICINE

## 2023-11-14 PROCEDURE — 3074F PR MOST RECENT SYSTOLIC BLOOD PRESSURE < 130 MM HG: ICD-10-PCS | Mod: CPTII,S$GLB,, | Performed by: INTERNAL MEDICINE

## 2023-11-14 PROCEDURE — 3288F PR FALLS RISK ASSESSMENT DOCUMENTED: ICD-10-PCS | Mod: CPTII,S$GLB,, | Performed by: INTERNAL MEDICINE

## 2023-11-14 PROCEDURE — 99397 PER PM REEVAL EST PAT 65+ YR: CPT | Mod: GZ,S$GLB,, | Performed by: INTERNAL MEDICINE

## 2023-11-14 PROCEDURE — 1125F PR PAIN SEVERITY QUANTIFIED, PAIN PRESENT: ICD-10-PCS | Mod: CPTII,S$GLB,, | Performed by: INTERNAL MEDICINE

## 2023-11-14 PROCEDURE — 3288F FALL RISK ASSESSMENT DOCD: CPT | Mod: CPTII,S$GLB,, | Performed by: INTERNAL MEDICINE

## 2023-11-14 RX ORDER — ROSUVASTATIN CALCIUM 10 MG/1
10 TABLET, COATED ORAL DAILY
Qty: 90 TABLET | Refills: 3 | Status: SHIPPED | OUTPATIENT
Start: 2023-11-14

## 2023-11-14 RX ORDER — METOPROLOL SUCCINATE 25 MG/1
25 TABLET, EXTENDED RELEASE ORAL DAILY
Qty: 90 TABLET | Refills: 3 | Status: SHIPPED | OUTPATIENT
Start: 2023-11-14

## 2023-11-14 RX ORDER — LORAZEPAM 0.5 MG/1
0.5 TABLET ORAL
Qty: 30 TABLET | Refills: 0 | Status: SHIPPED | OUTPATIENT
Start: 2023-11-14 | End: 2024-03-06

## 2023-11-14 NOTE — PROGRESS NOTES
Patient ID: Ross Rodriguez is a 78 y.o. female.    Chief Complaint: Annual Exam      Assessment:       1. Annual physical exam    2. Multiple lung nodules on CT: stable on CT 9/23    3. ILD (interstitial lung disease): see CT 2019, also 2020; stable 8/21; stable 9/22 and 9/23    4. Mixed hyperlipidemia    5. Aneurysm of ascending aorta without rupture    6. Aortic atherosclerosis: see CT scan 7/18; stable 8/20; stable 8/21    7. Age-related osteoporosis; see DEXA 2019 also 2021    8. Bilateral back pain, unspecified back location, unspecified chronicity          Plan:         1. Annual physical exam    2. Multiple lung nodules on CT: stable on CT 9/23    3. ILD (interstitial lung disease): see CT 2019, also 2020; stable 8/21; stable 9/22 and 9/23  Overview:  Very minimal, no changes in past 4 ct's  Pfts with no obstruction or restriction and mild diffusion impairment      4. Mixed hyperlipidemia    5. Aneurysm of ascending aorta without rupture    6. Aortic atherosclerosis: see CT scan 7/18; stable 8/20; stable 8/21    7. Age-related osteoporosis; see DEXA 2019 also 2021    8. Bilateral back pain, unspecified back location, unspecified chronicity    Other orders  -     rosuvastatin (CRESTOR) 10 MG tablet; Take 1 tablet (10 mg total) by mouth once daily.  Dispense: 90 tablet; Refill: 3  -     metoprolol succinate (TOPROL-XL) 25 MG 24 hr tablet; Take 1 tablet (25 mg total) by mouth once daily.  Dispense: 90 tablet; Refill: 3  -     LORazepam (ATIVAN) 0.5 MG tablet; Take 1 tablet (0.5 mg total) by mouth as needed for Anxiety (May take 1-2 x weekly for anxiety or for travel).  Dispense: 30 tablet; Refill: 0       Continue with exercise and healthy habits  Fall prevention reviewed  Mammogram and DEXA are scheduled  Dermatology appointment  Consider COVID booster   reviewed  Anticipate follow-up with me within the next year, sooner with problems in the interim  Keep CTS follow-up at the appropriate  interval    Subjective:   Annual exam    Ongoing issues with back pain, DJD, sciatica.    Exercising regularly.  Feels well.    Will get COVID booster soon.  DEXA is scheduled for December.  On Prolia.    Pain Clinic November 2023  CTS September 2023  Optometry August 2023  Ortho June 2023  Prolia June 2023  CT chest March 2023  Endocrinology January 2023  Mammogram November 2022  DEXA scan October 2021  Colonoscopy July 2015    Patient Active Problem List:     Gastroesophageal reflux disease without esophagitis     Nuclear sclerosis - Both Eyes     Tinnitus     Diverticulosis of small and large intestine see CT scan 7/15     Mixed hyperlipidemia     Lung cyst: R side see CT 2009     Liver cyst: see CT 2009; NOT seen on u/s 2018     Primary insomnia     Age-related osteoporosis; see DEXA 2019 also 2021     Aortic atherosclerosis: see CT scan 7/18; stable 8/20; stable 8/21     Bilateral back pain     ILD (interstitial lung disease): see CT 2019, also 2020; stable 8/21; stable 9/22 and 9/23     Chronic pain of both shoulders     Decreased ROM of left shoulder     Pulmonary hypertension, unspecified: see ECHO 2019     Thoracic aortic aneurysm (TAA)     Multiple lung nodules on CT: stable on CT 9/23           Review of Systems   Constitutional:  Negative for activity change and unexpected weight change.   HENT:  Negative for hearing loss, rhinorrhea and trouble swallowing.    Eyes:  Negative for discharge and visual disturbance.   Respiratory:  Negative for chest tightness and wheezing.    Cardiovascular:  Negative for chest pain and palpitations.   Gastrointestinal:  Negative for blood in stool, constipation, diarrhea and vomiting.   Endocrine: Negative for polydipsia and polyuria.   Genitourinary:  Negative for difficulty urinating, dysuria, hematuria and menstrual problem.   Musculoskeletal:  Positive for arthralgias and joint swelling. Negative for neck pain.   Neurological:  Negative for weakness and headaches.    Psychiatric/Behavioral:  Negative for confusion and dysphoric mood.          Objective:      Physical Exam  Vitals and nursing note reviewed.   Constitutional:       Appearance: She is well-developed.   HENT:      Head: Normocephalic and atraumatic.      Right Ear: External ear normal.      Left Ear: External ear normal.      Nose: Nose normal.      Mouth/Throat:      Pharynx: No oropharyngeal exudate.   Eyes:      General: No scleral icterus.     Extraocular Movements: Extraocular movements intact.      Conjunctiva/sclera: Conjunctivae normal.   Neck:      Thyroid: No thyromegaly.      Vascular: No JVD.   Cardiovascular:      Rate and Rhythm: Normal rate and regular rhythm.      Heart sounds: Normal heart sounds. No murmur heard.     No gallop.   Pulmonary:      Effort: Pulmonary effort is normal. No respiratory distress.      Breath sounds: Normal breath sounds. No wheezing.   Abdominal:      General: Bowel sounds are normal. There is no distension.      Palpations: Abdomen is soft. There is no mass.      Tenderness: There is no abdominal tenderness. There is no guarding or rebound.   Musculoskeletal:         General: No tenderness. Normal range of motion.      Cervical back: Normal range of motion and neck supple.   Lymphadenopathy:      Cervical: No cervical adenopathy.   Skin:     General: Skin is warm.      Findings: No erythema or rash.   Neurological:      General: No focal deficit present.      Mental Status: She is alert and oriented to person, place, and time.      Cranial Nerves: No cranial nerve deficit.      Coordination: Coordination normal.   Psychiatric:         Behavior: Behavior normal.         Thought Content: Thought content normal.         Judgment: Judgment normal.             Health Maintenance Due   Topic Date Due    COVID-19 Vaccine (6 - 2023-24 season) 09/01/2023    DEXA Scan  10/01/2023

## 2023-11-30 ENCOUNTER — HOSPITAL ENCOUNTER (OUTPATIENT)
Dept: RADIOLOGY | Facility: HOSPITAL | Age: 78
Discharge: HOME | End: 2023-11-30
Attending: INTERNAL MEDICINE
Payer: MEDICARE

## 2023-11-30 VITALS — WEIGHT: 160 LBS | HEIGHT: 60 IN | BODY MASS INDEX: 31.41 KG/M2

## 2023-11-30 DIAGNOSIS — Z12.31 SCREENING MAMMOGRAM, ENCOUNTER FOR: ICD-10-CM

## 2023-11-30 PROCEDURE — 77063 BREAST TOMOSYNTHESIS BI: CPT | Mod: 26,,, | Performed by: RADIOLOGY

## 2023-11-30 PROCEDURE — 77067 SCR MAMMO BI INCL CAD: CPT | Mod: TC

## 2023-11-30 PROCEDURE — 77063 MAMMO DIGITAL SCREENING BILAT WITH TOMO: ICD-10-PCS | Mod: 26,,, | Performed by: RADIOLOGY

## 2023-11-30 PROCEDURE — 77067 SCR MAMMO BI INCL CAD: CPT | Mod: 26,,, | Performed by: RADIOLOGY

## 2023-11-30 PROCEDURE — 77067 MAMMO DIGITAL SCREENING BILAT WITH TOMO: ICD-10-PCS | Mod: 26,,, | Performed by: RADIOLOGY

## 2023-12-07 ENCOUNTER — HOSPITAL ENCOUNTER (OUTPATIENT)
Dept: RADIOLOGY | Facility: CLINIC | Age: 78
Discharge: HOME | End: 2023-12-07
Attending: INTERNAL MEDICINE
Payer: MEDICARE

## 2023-12-07 DIAGNOSIS — M81.0 AGE-RELATED OSTEOPOROSIS WITHOUT CURRENT PATHOLOGICAL FRACTURE: ICD-10-CM

## 2023-12-07 PROCEDURE — 77080 DXA BONE DENSITY AXIAL SKELETON 1 OR MORE SITES: ICD-10-PCS | Mod: 26,,, | Performed by: INTERNAL MEDICINE

## 2023-12-07 PROCEDURE — 77080 DXA BONE DENSITY AXIAL: CPT | Mod: TC

## 2023-12-07 PROCEDURE — 77080 DXA BONE DENSITY AXIAL: CPT | Mod: 26,,, | Performed by: INTERNAL MEDICINE

## 2023-12-14 ENCOUNTER — INFUSION (OUTPATIENT)
Dept: INFECTIOUS DISEASES | Facility: HOSPITAL | Age: 78
End: 2023-12-14
Payer: MEDICARE

## 2023-12-14 VITALS
BODY MASS INDEX: 31.31 KG/M2 | SYSTOLIC BLOOD PRESSURE: 175 MMHG | HEART RATE: 63 BPM | HEIGHT: 60 IN | RESPIRATION RATE: 16 BRPM | WEIGHT: 159.5 LBS | TEMPERATURE: 98 F | OXYGEN SATURATION: 98 % | DIASTOLIC BLOOD PRESSURE: 70 MMHG

## 2023-12-14 DIAGNOSIS — M81.0 AGE-RELATED OSTEOPOROSIS WITHOUT CURRENT PATHOLOGICAL FRACTURE: Primary | ICD-10-CM

## 2023-12-14 PROCEDURE — 63600175 PHARM REV CODE 636 W HCPCS: Mod: JZ,JG | Performed by: NURSE PRACTITIONER

## 2023-12-14 PROCEDURE — 96372 THER/PROPH/DIAG INJ SC/IM: CPT

## 2023-12-14 RX ADMIN — DENOSUMAB 60 MG: 60 INJECTION SUBCUTANEOUS at 10:12

## 2023-12-14 NOTE — PROGRESS NOTES
Patient arrives for Prolia injection - confirms use of calcium and vitamin D supplements and denies dental procedures over past 3 months - administered per guidelines.    Given in [facility]. Pt tolerated well. Left in NAD. Next appt scheduled. Limited head-to-toe assessment due to privacy issues and visit reason though the opportunity was given for patient to express any concerns

## 2024-01-03 ENCOUNTER — OFFICE VISIT (OUTPATIENT)
Dept: DERMATOLOGY | Facility: CLINIC | Age: 79
End: 2024-01-03
Payer: MEDICARE

## 2024-01-03 DIAGNOSIS — L73.8 SEBACEOUS GLAND HYPERPLASIA: Primary | ICD-10-CM

## 2024-01-03 DIAGNOSIS — D23.9 INTRADERMAL NEVUS: ICD-10-CM

## 2024-01-03 DIAGNOSIS — L57.0 AK (ACTINIC KERATOSIS): ICD-10-CM

## 2024-01-03 PROCEDURE — 99203 OFFICE O/P NEW LOW 30 MIN: CPT | Mod: 25,S$GLB,, | Performed by: DERMATOLOGY

## 2024-01-03 PROCEDURE — 17000 DESTRUCT PREMALG LESION: CPT | Mod: S$GLB,,, | Performed by: DERMATOLOGY

## 2024-01-03 PROCEDURE — 99999 PR PBB SHADOW E&M-EST. PATIENT-LVL III: CPT | Mod: PBBFAC,,, | Performed by: DERMATOLOGY

## 2024-01-03 NOTE — PROGRESS NOTES
Subjective:      Patient ID:  Ross Rodriguez is a 78 y.o. female who presents for   Chief Complaint   Patient presents with    Lesion     Nose     Patient with new complaint of lesion(s)  Location: nose (two areas)  Duration: 6mos  Symptoms: none  Relieving factors/Previous treatments: none     Patient with new complaint of lesion(s)  Location: Right scalp  Duration: as long as she can remember   Symptoms: none  Relieving factors/Previous treatments: none       Lesion      Review of Systems   Skin:  Positive for activity-related sunscreen use. Negative for daily sunscreen use and recent sunburn.   Hematologic/Lymphatic: Bruises/bleeds easily (bruise).       Objective:   Physical Exam   Constitutional: She appears well-developed and well-nourished. No distress.   Neurological: She is alert and oriented to person, place, and time. She is not disoriented.   Psychiatric: She has a normal mood and affect.   Skin:   Areas Examined (abnormalities noted in diagram):   Head / Face Inspection Performed            Diagram Legend     Erythematous scaling macule/papule c/w actinic keratosis       Vascular papule c/w angioma      Pigmented verrucoid papule/plaque c/w seborrheic keratosis      Yellow umbilicated papule c/w sebaceous hyperplasia      Irregularly shaped tan macule c/w lentigo     1-2 mm smooth white papules consistent with Milia      Movable subcutaneous cyst with punctum c/w epidermal inclusion cyst      Subcutaneous movable cyst c/w pilar cyst      Firm pink to brown papule c/w dermatofibroma      Pedunculated fleshy papule(s) c/w skin tag(s)      Evenly pigmented macule c/w junctional nevus     Mildly variegated pigmented, slightly irregular-bordered macule c/w mildly atypical nevus      Flesh colored to evenly pigmented papule c/w intradermal nevus       Pink pearly papule/plaque c/w basal cell carcinoma      Erythematous hyperkeratotic cursted plaque c/w SCC      Surgical scar with no sign of skin cancer  recurrence      Open and closed comedones      Inflammatory papules and pustules      Verrucoid papule consistent consistent with wart     Erythematous eczematous patches and plaques     Dystrophic onycholytic nail with subungual debris c/w onychomycosis     Umbilicated papule    Erythematous-base heme-crusted tan verrucoid plaque consistent with inflamed seborrheic keratosis     Erythematous Silvery Scaling Plaque c/w Psoriasis     See annotation      Assessment / Plan:        Sebaceous gland hyperplasia  This is a common condition representing benign enlargement of the sebaceous lobule. It typically occurs in adulthood. Reassurance given to patient.     Intradermal nevus  reassurance  Discussed ABCDE's of nevi.  Monitor for new mole or moles that are becoming bigger, darker, irritated, or developing irregular borders. Brochure provided. Instructed patient to observe lesion(s) for changes and follow up in clinic if changes are noted. Patient to monitor skin at home for new or changing lesions.     AK (actinic keratosis)  Cryosurgery Procedure Note    Verbal consent from the patient is obtained including, but not limited to, risk of hypopigmentation/hyperpigmentation, scar, recurrence of lesion. The patient is aware of the precancerous quality and need for treatment of these lesions. Liquid nitrogen cryosurgery is applied to the 1 actinic keratoses, as detailed in the physical exam, to produce a freeze injury. The patient is aware that blisters may form and is instructed on wound care with gentle cleansing and use of vaseline ointment to keep moist until healed. The patient is supplied a handout on cryosurgery and is instructed to call if lesions do not completely resolve.             Follow up if symptoms worsen or fail to improve, for or sooner if lesion tx with cryo does not resolve.
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
## 2024-03-06 ENCOUNTER — LAB VISIT (OUTPATIENT)
Dept: LAB | Facility: HOSPITAL | Age: 79
End: 2024-03-06
Payer: MEDICARE

## 2024-03-06 ENCOUNTER — OFFICE VISIT (OUTPATIENT)
Dept: ENDOCRINOLOGY | Facility: CLINIC | Age: 79
End: 2024-03-06
Payer: MEDICARE

## 2024-03-06 VITALS
OXYGEN SATURATION: 98 % | DIASTOLIC BLOOD PRESSURE: 50 MMHG | SYSTOLIC BLOOD PRESSURE: 151 MMHG | HEART RATE: 59 BPM | BODY MASS INDEX: 31.01 KG/M2 | WEIGHT: 157.94 LBS | HEIGHT: 60 IN

## 2024-03-06 DIAGNOSIS — M81.0 AGE-RELATED OSTEOPOROSIS WITHOUT CURRENT PATHOLOGICAL FRACTURE: Primary | ICD-10-CM

## 2024-03-06 DIAGNOSIS — M81.0 AGE-RELATED OSTEOPOROSIS WITHOUT CURRENT PATHOLOGICAL FRACTURE: ICD-10-CM

## 2024-03-06 LAB — 25(OH)D3+25(OH)D2 SERPL-MCNC: 49 NG/ML (ref 30–96)

## 2024-03-06 PROCEDURE — 99999 PR PBB SHADOW E&M-EST. PATIENT-LVL III: CPT | Mod: PBBFAC,,, | Performed by: NURSE PRACTITIONER

## 2024-03-06 PROCEDURE — 36415 COLL VENOUS BLD VENIPUNCTURE: CPT | Performed by: NURSE PRACTITIONER

## 2024-03-06 PROCEDURE — 99214 OFFICE O/P EST MOD 30 MIN: CPT | Mod: S$GLB,,, | Performed by: NURSE PRACTITIONER

## 2024-03-06 PROCEDURE — 82306 VITAMIN D 25 HYDROXY: CPT | Performed by: NURSE PRACTITIONER

## 2024-03-06 NOTE — ASSESSMENT & PLAN NOTE
-- Risks include +FH, PPI therapy.  -- Reviewed basics of quantity versus quality.  -- Reassuring they are not fracturing.  -- Recommend:  -Pharmacological therapy is recommended for patients with osteopenia if the 10 year probability of a hip fracture is >3% and 10 year probability of other major osteoporotic fractures is >20%.  Treatment options and potential side effects discussed for PO bisphosphonates, reclast, Denosumab, and Teriparatide.   -Treatment:   The patient reports taking Fosamax in the past but states she cannot tolerate due to GI upset     Prolia  Start: 11/2017  Last Dose: 12/14/2023    Therapy plan reordered.    -Calcium and Vitamin D RDD provided.  -Exercise: recommended.  -Fall precautions made in the home.  -Alerted that if dental work needs to be done it should be done prior to initiating therapy. Dental health: UTD.  -- Repeat DEXA scan 12/2025.

## 2024-04-30 DIAGNOSIS — Z00.00 ENCOUNTER FOR MEDICARE ANNUAL WELLNESS EXAM: ICD-10-CM

## 2024-05-06 ENCOUNTER — OFFICE VISIT (OUTPATIENT)
Dept: INTERNAL MEDICINE | Facility: CLINIC | Age: 79
End: 2024-05-06
Payer: MEDICARE

## 2024-05-06 ENCOUNTER — NURSE TRIAGE (OUTPATIENT)
Dept: ADMINISTRATIVE | Facility: CLINIC | Age: 79
End: 2024-05-06
Payer: MEDICARE

## 2024-05-06 VITALS
WEIGHT: 159.63 LBS | DIASTOLIC BLOOD PRESSURE: 70 MMHG | HEART RATE: 67 BPM | BODY MASS INDEX: 31.34 KG/M2 | SYSTOLIC BLOOD PRESSURE: 140 MMHG | OXYGEN SATURATION: 97 % | HEIGHT: 60 IN

## 2024-05-06 DIAGNOSIS — N76.0 ACUTE VAGINITIS: Primary | ICD-10-CM

## 2024-05-06 PROCEDURE — 1159F MED LIST DOCD IN RCRD: CPT | Mod: CPTII,S$GLB,,

## 2024-05-06 PROCEDURE — 3077F SYST BP >= 140 MM HG: CPT | Mod: CPTII,S$GLB,,

## 2024-05-06 PROCEDURE — 1125F AMNT PAIN NOTED PAIN PRSNT: CPT | Mod: CPTII,S$GLB,,

## 2024-05-06 PROCEDURE — 3288F FALL RISK ASSESSMENT DOCD: CPT | Mod: CPTII,S$GLB,,

## 2024-05-06 PROCEDURE — 99999 PR PBB SHADOW E&M-EST. PATIENT-LVL III: CPT | Mod: PBBFAC,,,

## 2024-05-06 PROCEDURE — 1101F PT FALLS ASSESS-DOCD LE1/YR: CPT | Mod: CPTII,S$GLB,,

## 2024-05-06 PROCEDURE — 99213 OFFICE O/P EST LOW 20 MIN: CPT | Mod: S$GLB,,,

## 2024-05-06 PROCEDURE — 3078F DIAST BP <80 MM HG: CPT | Mod: CPTII,S$GLB,,

## 2024-05-06 PROCEDURE — 1160F RVW MEDS BY RX/DR IN RCRD: CPT | Mod: CPTII,S$GLB,,

## 2024-05-06 RX ORDER — CLOTRIMAZOLE AND BETAMETHASONE DIPROPIONATE 10; .64 MG/G; MG/G
CREAM TOPICAL 2 TIMES DAILY
Qty: 45 G | Refills: 0 | Status: SHIPPED | OUTPATIENT
Start: 2024-05-06

## 2024-05-06 NOTE — TELEPHONE ENCOUNTER
Pt stated she gets vaganitis every year when it turns warm and she needs a refill on her prescription. Clotrimazole-betamethasone 1%-0.05%. Vaginal itching started yesterday. Moderate persistent itching. Care advice recommend pt see MD within 24 hours. Appt given.   Reason for Disposition   MODERATE-SEVERE itching (i.e., interferes with school, work, or sleep)    Additional Information   Negative: Sounds like a life-threatening emergency to the triager   Negative: Patient sounds very sick or weak to the triager   Negative: [1] SEVERE pain AND [2] not improved 2 hours after pain medicine   Negative: [1] Genital area looks infected (e.g., draining sore, spreading redness) AND [2] fever   Negative: [1] Something is hanging out of the vagina AND [2] can't easily be pushed back inside    Protocols used: Vaginal Symptoms-A-AH

## 2024-05-06 NOTE — PROGRESS NOTES
INTERNAL MEDICINE PROGRESS/URGENT CARE NOTE    Patient: Ross Rodriguez  : 1945  MRN: [id number]  PCP: Nahomi Nolasco MD    CHIEF COMPLAINT     Chief Complaint   Patient presents with    Vaginitis       HPI     Ross is a 78 y.o. female with pulm HTN, HLD, aortic atherosclerosis, osteoporosis, GERD, chronic shoulder pain, insomnia who presents for an urgent visit today with c/o vaginal itching that started yesterday. Denies (new or worsening) urinary frequency, dysuria, christen, flank pain, hematuria, vaginal discharge, pelvic painl. She states she typically gets vaginitis once a year and has been given lotrisone cream in the past which helps. She had a vaginosis swab sent in  with normal findings. Last seen by Gyn in .        Past Medical History:  Past Medical History:   Diagnosis Date    Allergy     Anxiety     Aortic atherosclerosis: see CT scan 2018    Arthritis     Cataract     Depression     Diverticulosis 2015    GERD (gastroesophageal reflux disease)     Hyperlipidemia     Joint pain     Liver cyst: see CT 2016    Lower back pain 2019    Lung cyst: R side see CT 2016    Nuclear sclerosis - Both Eyes 2014    Osteoporosis     Primary insomnia 2016    Primary insomnia 2016        Past Surgical History:  Past Surgical History:   Procedure Laterality Date    CHOLECYSTECTOMY      DILATION AND CURETTAGE OF UTERUS      WISDOM TOOTH EXTRACTION          Allergies:  Review of patient's allergies indicates:   Allergen Reactions    Fosamax [alendronate] Nausea Only    Nsaids (non-steroidal anti-inflammatory drug) Hives    Sulfa (sulfonamide antibiotics) Hives       Home Medications:    Current Outpatient Medications:     acetaminophen (TYLENOL) 500 mg Cap, , Disp: , Rfl:     calcium citrate-vitamin D2 1,500-200 mg-unit Tab, , Disp: , Rfl:     denosumab (PROLIA) 60 mg/mL Syrg, Inject 60 mg into the skin every 6 (six) months., Disp: , Rfl:     fish  oil-omega-3 fatty acids 300-1,000 mg capsule, Take 2 g by mouth once daily. Takes 2 times daily, Disp: , Rfl:     FLAXSEED ORAL, Take 1 application by mouth once daily at 6am. Add seeds to oatmeal, Disp: , Rfl:     gabapentin (NEURONTIN) 300 MG capsule, Take 1 capsule (300 mg total) by mouth every evening., Disp: 30 capsule, Rfl: 11    lactobacillus combo no.11 (PROBIOTIC) 15 billion cell CpSP, , Disp: , Rfl:     metoprolol succinate (TOPROL-XL) 25 MG 24 hr tablet, Take 1 tablet (25 mg total) by mouth once daily., Disp: 90 tablet, Rfl: 3    multivitamin (THERAGRAN) per tablet, Take 1 tablet by mouth once daily., Disp: , Rfl:     rosuvastatin (CRESTOR) 10 MG tablet, Take 1 tablet (10 mg total) by mouth once daily., Disp: 90 tablet, Rfl: 3    clotrimazole-betamethasone 1-0.05% (LOTRISONE) cream, Apply topically 2 (two) times daily., Disp: 45 g, Rfl: 0    LORazepam (ATIVAN) 0.5 MG tablet, Take 1 tablet (0.5 mg total) by mouth as needed for Anxiety (May take 1-2 x weekly for anxiety or for travel)., Disp: 30 tablet, Rfl: 0     Review of Systems:  Review of Systems   Constitutional:  Negative for fever.   Respiratory:  Negative for chest tightness and shortness of breath.    Cardiovascular:  Negative for chest pain.   Gastrointestinal:  Negative for abdominal pain, blood in stool, diarrhea, nausea and vomiting.   Genitourinary:         Vaginal itching   Musculoskeletal:  Negative for myalgias.   Neurological:  Negative for dizziness, weakness and headaches.   Psychiatric/Behavioral:  Negative for suicidal ideas.          PHYSICAL EXAM     Vitals:    05/06/24 1002   BP: (!) 140/70   BP Location: Left arm   Patient Position: Sitting   BP Method: Medium (Manual)   Pulse: 67   SpO2: 97%   Weight: 72.4 kg (159 lb 9.8 oz)   Height: 5' (1.524 m)      Body mass index is 31.17 kg/m².     Physical Exam  Constitutional:       Appearance: Normal appearance.   HENT:      Head: Normocephalic.   Eyes:      Extraocular Movements:  Extraocular movements intact.      Pupils: Pupils are equal, round, and reactive to light.   Cardiovascular:      Rate and Rhythm: Normal rate and regular rhythm.   Pulmonary:      Effort: Pulmonary effort is normal. No respiratory distress.      Breath sounds: Normal breath sounds.   Abdominal:      General: Bowel sounds are normal.      Palpations: Abdomen is soft.   Genitourinary:     Comments: Exam deferred per patient.  Musculoskeletal:         General: Normal range of motion.      Cervical back: Normal range of motion.   Skin:     General: Skin is warm and dry.   Neurological:      General: No focal deficit present.      Mental Status: She is alert and oriented to person, place, and time.         LABS     Lab Results   Component Value Date    HGBA1C 5.4 11/08/2023     CMP  Sodium   Date Value Ref Range Status   11/08/2023 143 136 - 145 mmol/L Final     Potassium   Date Value Ref Range Status   11/08/2023 4.3 3.5 - 5.1 mmol/L Final     Chloride   Date Value Ref Range Status   11/08/2023 104 95 - 110 mmol/L Final     CO2   Date Value Ref Range Status   11/08/2023 29 23 - 29 mmol/L Final     Glucose   Date Value Ref Range Status   11/08/2023 103 70 - 110 mg/dL Final     BUN   Date Value Ref Range Status   11/08/2023 14 7 - 17 mg/dL Final     Creatinine   Date Value Ref Range Status   11/08/2023 0.79 0.50 - 1.40 mg/dL Final     Calcium   Date Value Ref Range Status   11/08/2023 9.5 8.7 - 10.5 mg/dL Final     Total Protein   Date Value Ref Range Status   11/08/2023 7.3 6.0 - 8.4 g/dL Final     Albumin   Date Value Ref Range Status   11/08/2023 4.5 3.5 - 5.2 g/dL Final     Total Bilirubin   Date Value Ref Range Status   11/08/2023 0.7 0.1 - 1.0 mg/dL Final     Comment:     For infants and newborns, interpretation of results should be based  on gestational age, weight and in agreement with clinical  observations.    Premature Infant recommended reference ranges:  Up to 24 hours.............<8.0 mg/dL  Up to 48  hours............<12.0 mg/dL  3-5 days..................<15.0 mg/dL  6-29 days.................<15.0 mg/dL       Alkaline Phosphatase   Date Value Ref Range Status   11/08/2023 42 38 - 126 U/L Final     AST   Date Value Ref Range Status   11/08/2023 26 15 - 46 U/L Final     ALT   Date Value Ref Range Status   11/08/2023 23 10 - 44 U/L Final     Anion Gap   Date Value Ref Range Status   11/08/2023 10 8 - 16 mmol/L Final     eGFR if    Date Value Ref Range Status   09/28/2021 >60.0 >60 mL/min/1.73 m^2 Final     eGFR if non    Date Value Ref Range Status   09/28/2021 >60.0 >60 mL/min/1.73 m^2 Final     Comment:     Calculation used to obtain the estimated glomerular filtration  rate (eGFR) is the CKD-EPI equation.        Lab Results   Component Value Date    WBC 5.33 11/08/2023    HGB 12.2 11/08/2023    HCT 37.1 11/08/2023    MCV 89 11/08/2023     11/08/2023     Lab Results   Component Value Date    CHOL 131 11/08/2023    CHOL 136 11/04/2022    CHOL 121 09/28/2021     Lab Results   Component Value Date    HDL 53 11/08/2023    HDL 59 11/04/2022    HDL 56 09/28/2021     Lab Results   Component Value Date    LDLCALC 60.2 (L) 11/08/2023    LDLCALC 52.4 (L) 11/04/2022    LDLCALC 44.6 (L) 09/28/2021     Lab Results   Component Value Date    TRIG 89 11/08/2023    TRIG 123 11/04/2022    TRIG 102 09/28/2021     Lab Results   Component Value Date    CHOLHDL 40.5 11/08/2023    CHOLHDL 43.4 11/04/2022    CHOLHDL 46.3 09/28/2021     Lab Results   Component Value Date    TSH 1.960 11/08/2023       ASSESSMENT & PLAN       1. Acute vaginitis  Comments:  VSS. Exam/swab deferred by patient. Will trial lotrisone. If sx do not improve, recommend fu with Gyn.  Orders:  -     clotrimazole-betamethasone 1-0.05% (LOTRISONE) cream; Apply topically 2 (two) times daily.  Dispense: 45 g; Refill: 0         Follow up if symptoms worsen or fail to improve.    Patient was counseled on when to seek emergent care.  Patient's plan/treatment was discussed including medications and possible side effects. Verbalized understanding of all instructions.            LUI Robbins, FNP-C  Department of Internal Medicine  [facility]

## 2024-05-07 DIAGNOSIS — M51.36 DDD (DEGENERATIVE DISC DISEASE), LUMBAR: Primary | ICD-10-CM

## 2024-05-08 ENCOUNTER — HOSPITAL ENCOUNTER (OUTPATIENT)
Dept: RADIOLOGY | Facility: HOSPITAL | Age: 79
Discharge: HOME | End: 2024-05-08
Attending: REGISTERED NURSE
Payer: MEDICARE

## 2024-05-08 DIAGNOSIS — M51.36 DDD (DEGENERATIVE DISC DISEASE), LUMBAR: ICD-10-CM

## 2024-05-08 PROCEDURE — 72110 X-RAY EXAM L-2 SPINE 4/>VWS: CPT | Mod: TC,FY,PN

## 2024-05-08 PROCEDURE — 72110 X-RAY EXAM L-2 SPINE 4/>VWS: CPT | Mod: 26,,, | Performed by: RADIOLOGY

## 2024-05-08 NOTE — PROGRESS NOTES
DATE: 5/9/2024  PATIENT: Ross Rodriguez    Attending Physician: Oretses Flores M.D.    HISTORY:  Ross Rodriguez is a 78 y.o. female who returns to me today for follow up.  She was last seen by me 6/15/2023.  Today she is doing well but notes intermittent low back and right lateral leg pain. She is established with pain mgmt and will get burs injections every 6-7 months for her pain.  The Patient denies myelopathic symptoms such as handwriting changes or difficulty with buttons/coins/keys. Denies perineal paresthesias, bowel/bladder dysfunction.    PMH/PSH/FamHx/SocHx:  Unchanged from prior visit    ROS:  REVIEW OF SYSTEMS:  Constitution: Negative. Negative for chills, fever and night sweats.   HENT: Negative for congestion and headaches.    Eyes: Negative for blurred vision, left vision loss and right vision loss.   Cardiovascular: Negative for chest pain and syncope.   Respiratory: Negative for cough and shortness of breath.    Endocrine: Negative for polydipsia, polyphagia and polyuria.   Hematologic/Lymphatic: Negative for bleeding problem. Does not bruise/bleed easily.   Skin: Negative for dry skin, itching and rash.   Musculoskeletal: Negative for falls and muscle weakness.   Gastrointestinal: Negative for abdominal pain and bowel incontinence.   Allergic/Immunologic: Negative for hives and persistent infections.  Genitourinary: Negative for urinary retention/incontinence and nocturia.   Neurological: negative for disturbances in coordination, no myelopathic symptoms such as handwriting changes or difficulty with buttons, coins, keys or small objects. No loss of balance and seizures.   Psychiatric/Behavioral: Negative for depression. The patient does not have insomnia.   Denies perineal paresthesias, bowel or bladder incontinence    EXAM:  Ht 5' (1.524 m)   Wt 73.1 kg (161 lb 0.7 oz)   BMI 31.45 kg/m²     My physical examination was notable for the following findings:     Normal station and gait, no  difficulty with toe or heel walk.   Dorsal lumbar skin negative for rashes, lesions, hairy patches and surgical scars. There is mild lumbar tenderness to palpation.  Lumbar range of motion is acceptable.  Global saggital and coronal spinal balance acceptable, not significant for scoliosis and kyphosis.  No pain with the range of motion of the bilateral hips. No trochanteric tenderness to palpation.  Bilateral lower extremities warm and well perfused, dorsalis pedis pulses 2+ bilaterally.  Normal strength and tone in all major motor groups in the bilateral lower extremities. Normal sensation to light touch in the L2-S1 dermatomes bilaterally.  Deep tendon reflexes symmetric 2+ in the bilateral lower extremities.  Negative Babinski bilaterally. Straight leg raise negative bilaterally.      IMAGING:    Today I personally re- reviewed AP, Lat and Flex/Ex  upright L-spine that demonstrate mild DDD at L3/4.       Thoracic MRI shows no spinal canal stenosis or neural foraminal narrowing within the thoracic spine.     Lumbar MRI shows mild spinal canal stenosis at L2-L3 with severe facet arthropathy at L3-L5.     Body mass index is 31.45 kg/m².    Hemoglobin A1C   Date Value Ref Range Status   11/08/2023 5.4 4.0 - 5.6 % Final     Comment:     ADA Screening Guidelines:  5.7-6.4%  Consistent with prediabetes  >or=6.5%  Consistent with diabetes    High levels of fetal hemoglobin interfere with the HbA1C  assay. Heterozygous hemoglobin variants (HbS, HgC, etc)do  not significantly interfere with this assay.   However, presence of multiple variants may affect accuracy.     11/04/2022 5.5 4.0 - 5.6 % Final     Comment:     ADA Screening Guidelines:  5.7-6.4%  Consistent with prediabetes  >or=6.5%  Consistent with diabetes    High levels of fetal hemoglobin interfere with the HbA1C  assay. Heterozygous hemoglobin variants (HbS, HgC, etc)do  not significantly interfere with this assay.   However, presence of multiple variants may  affect accuracy.     09/28/2021 5.4 4.0 - 5.6 % Final     Comment:     ADA Screening Guidelines:  5.7-6.4%  Consistent with prediabetes  >or=6.5%  Consistent with diabetes    High levels of fetal hemoglobin interfere with the HbA1C  assay. Heterozygous hemoglobin variants (HbS, HgC, etc)do  not significantly interfere with this assay.   However, presence of multiple variants may affect accuracy.           ASSESSMENT/PLAN:    Ross was seen today for low-back pain and back pain.    Diagnoses and all orders for this visit:    Chronic bilateral thoracic back pain  -     gabapentin (NEURONTIN) 300 MG capsule; Take 1 capsule (300 mg total) by mouth every evening.    Lumbar radiculopathy  -     gabapentin (NEURONTIN) 300 MG capsule; Take 1 capsule (300 mg total) by mouth every evening.      Gabapentin refilled. She may follow up as needed.

## 2024-05-09 ENCOUNTER — OFFICE VISIT (OUTPATIENT)
Dept: ORTHOPEDICS | Facility: CLINIC | Age: 79
End: 2024-05-09
Payer: MEDICARE

## 2024-05-09 VITALS — HEIGHT: 60 IN | WEIGHT: 161.06 LBS | BODY MASS INDEX: 31.62 KG/M2

## 2024-05-09 DIAGNOSIS — G89.29 CHRONIC BILATERAL THORACIC BACK PAIN: ICD-10-CM

## 2024-05-09 DIAGNOSIS — M54.16 LUMBAR RADICULOPATHY: ICD-10-CM

## 2024-05-09 DIAGNOSIS — M54.6 CHRONIC BILATERAL THORACIC BACK PAIN: ICD-10-CM

## 2024-05-09 PROCEDURE — 3288F FALL RISK ASSESSMENT DOCD: CPT | Mod: CPTII,S$GLB,, | Performed by: REGISTERED NURSE

## 2024-05-09 PROCEDURE — 1125F AMNT PAIN NOTED PAIN PRSNT: CPT | Mod: CPTII,S$GLB,, | Performed by: REGISTERED NURSE

## 2024-05-09 PROCEDURE — 99999 PR PBB SHADOW E&M-EST. PATIENT-LVL III: CPT | Mod: PBBFAC,,, | Performed by: REGISTERED NURSE

## 2024-05-09 PROCEDURE — 1159F MED LIST DOCD IN RCRD: CPT | Mod: CPTII,S$GLB,, | Performed by: REGISTERED NURSE

## 2024-05-09 PROCEDURE — 99213 OFFICE O/P EST LOW 20 MIN: CPT | Mod: S$GLB,,, | Performed by: REGISTERED NURSE

## 2024-05-09 PROCEDURE — 1101F PT FALLS ASSESS-DOCD LE1/YR: CPT | Mod: CPTII,S$GLB,, | Performed by: REGISTERED NURSE

## 2024-05-09 RX ORDER — GABAPENTIN 300 MG/1
300 CAPSULE ORAL NIGHTLY
Qty: 30 CAPSULE | Refills: 11 | Status: SHIPPED | OUTPATIENT
Start: 2024-05-09 | End: 2025-05-09

## 2024-06-10 ENCOUNTER — PATIENT MESSAGE (OUTPATIENT)
Dept: INTERNAL MEDICINE | Facility: CLINIC | Age: 79
End: 2024-06-10
Payer: MEDICARE

## 2024-06-18 ENCOUNTER — INFUSION (OUTPATIENT)
Dept: INFECTIOUS DISEASES | Facility: HOSPITAL | Age: 79
End: 2024-06-18
Payer: MEDICARE

## 2024-06-18 VITALS
BODY MASS INDEX: 31.42 KG/M2 | RESPIRATION RATE: 18 BRPM | SYSTOLIC BLOOD PRESSURE: 166 MMHG | DIASTOLIC BLOOD PRESSURE: 72 MMHG | WEIGHT: 160.06 LBS | TEMPERATURE: 98 F | HEIGHT: 60 IN | HEART RATE: 72 BPM

## 2024-06-18 DIAGNOSIS — M81.0 AGE-RELATED OSTEOPOROSIS WITHOUT CURRENT PATHOLOGICAL FRACTURE: Primary | ICD-10-CM

## 2024-06-18 PROCEDURE — 96372 THER/PROPH/DIAG INJ SC/IM: CPT

## 2024-06-18 PROCEDURE — 63600175 PHARM REV CODE 636 W HCPCS: Mod: JZ,JG | Performed by: NURSE PRACTITIONER

## 2024-06-18 RX ADMIN — DENOSUMAB 60 MG: 60 INJECTION SUBCUTANEOUS at 11:06

## 2024-06-18 NOTE — PROGRESS NOTES
Patient arrives for Prolia injection - confirms use of calcium and vitamin D supplements and denies dental procedures over past 3 months - administered per guidelines       Limited head-to-toe assessment due to privacy issues and visit reason though the opportunity was given for patient to express any concerns

## 2024-07-15 DIAGNOSIS — I71.21 ANEURYSM OF ASCENDING AORTA WITHOUT RUPTURE: Primary | ICD-10-CM

## 2024-07-16 NOTE — PROGRESS NOTES
DATE: 7/17/2024  PATIENT: Ross Rodriguez    Attending Physician: Vincent East M.D.    HISTORY:  Ross Rodriguez is a 79 y.o. female who returns to me today for follow up.  She was last seen by me 5/9/2024.  Today she is doing well but notes left lower leg pain that increased over the past week.  The Patient denies myelopathic symptoms such as handwriting changes or difficulty with buttons/coins/keys. Denies perineal paresthesias, bowel/bladder dysfunction.      EXAM:  Ht 5' (1.524 m)   Wt 73.2 kg (161 lb 6 oz)   BMI 31.52 kg/m²   Stable.     IMAGING:    Today I personally re- reviewed AP, Lat and Flex/Ex  upright L-spine that demonstrate mild DDD at L3/4.       Thoracic MRI shows no spinal canal stenosis or neural foraminal narrowing within the thoracic spine.     Lumbar MRI shows mild spinal canal stenosis at L2-L3 with severe facet arthropathy at L3-L5.     Body mass index is 31.52 kg/m².    Hemoglobin A1C   Date Value Ref Range Status   11/08/2023 5.4 4.0 - 5.6 % Final     Comment:     ADA Screening Guidelines:  5.7-6.4%  Consistent with prediabetes  >or=6.5%  Consistent with diabetes    High levels of fetal hemoglobin interfere with the HbA1C  assay. Heterozygous hemoglobin variants (HbS, HgC, etc)do  not significantly interfere with this assay.   However, presence of multiple variants may affect accuracy.     11/04/2022 5.5 4.0 - 5.6 % Final     Comment:     ADA Screening Guidelines:  5.7-6.4%  Consistent with prediabetes  >or=6.5%  Consistent with diabetes    High levels of fetal hemoglobin interfere with the HbA1C  assay. Heterozygous hemoglobin variants (HbS, HgC, etc)do  not significantly interfere with this assay.   However, presence of multiple variants may affect accuracy.     09/28/2021 5.4 4.0 - 5.6 % Final     Comment:     ADA Screening Guidelines:  5.7-6.4%  Consistent with prediabetes  >or=6.5%  Consistent with diabetes    High levels of fetal hemoglobin interfere with the HbA1C  assay.  Heterozygous hemoglobin variants (HbS, HgC, etc)do  not significantly interfere with this assay.   However, presence of multiple variants may affect accuracy.           ASSESSMENT/PLAN:    Ross was seen today for low-back pain and back pain.    Diagnoses and all orders for this visit:    Lumbar radiculopathy  -     gabapentin (NEURONTIN) 300 MG capsule; Take 1 capsule (300 mg total) by mouth 3 (three) times daily.  -     tiZANidine (ZANAFLEX) 2 MG tablet; Take 1-2 tablets (2-4 mg total) by mouth every 6 (six) hours as needed (muscle tension).      Gabapentin dose increased. Zanaflex sent to the pharmacy.   Follow up as needed.

## 2024-07-17 ENCOUNTER — OFFICE VISIT (OUTPATIENT)
Dept: ORTHOPEDICS | Facility: CLINIC | Age: 79
End: 2024-07-17
Payer: MEDICARE

## 2024-07-17 VITALS — BODY MASS INDEX: 31.68 KG/M2 | HEIGHT: 60 IN | WEIGHT: 161.38 LBS

## 2024-07-17 DIAGNOSIS — M54.16 LUMBAR RADICULOPATHY: Primary | ICD-10-CM

## 2024-07-17 PROCEDURE — 1159F MED LIST DOCD IN RCRD: CPT | Mod: CPTII,S$GLB,, | Performed by: REGISTERED NURSE

## 2024-07-17 PROCEDURE — 99999 PR PBB SHADOW E&M-EST. PATIENT-LVL III: CPT | Mod: PBBFAC,,, | Performed by: REGISTERED NURSE

## 2024-07-17 PROCEDURE — 1101F PT FALLS ASSESS-DOCD LE1/YR: CPT | Mod: CPTII,S$GLB,, | Performed by: REGISTERED NURSE

## 2024-07-17 PROCEDURE — 1126F AMNT PAIN NOTED NONE PRSNT: CPT | Mod: CPTII,S$GLB,, | Performed by: REGISTERED NURSE

## 2024-07-17 PROCEDURE — 3288F FALL RISK ASSESSMENT DOCD: CPT | Mod: CPTII,S$GLB,, | Performed by: REGISTERED NURSE

## 2024-07-17 PROCEDURE — 1160F RVW MEDS BY RX/DR IN RCRD: CPT | Mod: CPTII,S$GLB,, | Performed by: REGISTERED NURSE

## 2024-07-17 PROCEDURE — 99213 OFFICE O/P EST LOW 20 MIN: CPT | Mod: S$GLB,,, | Performed by: REGISTERED NURSE

## 2024-07-17 RX ORDER — GABAPENTIN 300 MG/1
300 CAPSULE ORAL 3 TIMES DAILY
Qty: 90 CAPSULE | Refills: 11 | Status: SHIPPED | OUTPATIENT
Start: 2024-07-17 | End: 2025-07-17

## 2024-07-17 RX ORDER — TIZANIDINE 2 MG/1
2-4 TABLET ORAL EVERY 6 HOURS PRN
Qty: 60 TABLET | Refills: 1 | Status: SHIPPED | OUTPATIENT
Start: 2024-07-17 | End: 2024-08-01

## 2024-08-12 NOTE — PROGRESS NOTES
Ross Rodriguez presented for a  Medicare AWV and comprehensive Health Risk Assessment today. The following components were reviewed and updated:    Medical history  Family History  Social history  Allergies and Current Medications  Health Risk Assessment  Health Maintenance  Care Team         ** See Completed Assessments for Annual Wellness Visit within the encounter summary.**         The following assessments were completed:  Living Situation  CAGE  Depression Screening  Timed Get Up and Go  Whisper Test  Cognitive Function Screening  Nutrition Screening  ADL Screening  PAQ Screening      Opioid documentation for eAWV      Patient {does/does not have a current opioid prescription:69902}     Review for Substance Use Disorders: Patient does ***not use substance        Current Outpatient Medications:     acetaminophen (TYLENOL) 500 mg Cap, , Disp: , Rfl:     calcium citrate-vitamin D2 1,500-200 mg-unit Tab, , Disp: , Rfl:     clotrimazole-betamethasone 1-0.05% (LOTRISONE) cream, Apply topically 2 (two) times daily., Disp: 45 g, Rfl: 0    denosumab (PROLIA) 60 mg/mL Syrg, Inject 60 mg into the skin every 6 (six) months., Disp: , Rfl:     fish oil-omega-3 fatty acids 300-1,000 mg capsule, Take 2 g by mouth once daily. Takes 2 times daily, Disp: , Rfl:     FLAXSEED ORAL, Take 1 application by mouth once daily at 6am. Add seeds to oatmeal, Disp: , Rfl:     gabapentin (NEURONTIN) 300 MG capsule, Take 1 capsule (300 mg total) by mouth 3 (three) times daily., Disp: 90 capsule, Rfl: 11    lactobacillus combo no.11 (PROBIOTIC) 15 billion cell CpSP, , Disp: , Rfl:     LORazepam (ATIVAN) 0.5 MG tablet, Take 1 tablet (0.5 mg total) by mouth as needed for Anxiety (May take 1-2 x weekly for anxiety or for travel)., Disp: 30 tablet, Rfl: 0    metoprolol succinate (TOPROL-XL) 25 MG 24 hr tablet, Take 1 tablet (25 mg total) by mouth once daily., Disp: 90 tablet, Rfl: 3    multivitamin (THERAGRAN) per tablet, Take 1 tablet by mouth  once daily., Disp: , Rfl:     rosuvastatin (CRESTOR) 10 MG tablet, Take 1 tablet (10 mg total) by mouth once daily., Disp: 90 tablet, Rfl: 3       There were no vitals filed for this visit.   Physical Exam          Diagnoses and health risks identified today and associated recommendations/orders:    1. Nuclear sclerosis of both eyes  ***    2. Multiple lung nodules on CT: stable on CT 9/23  ***    3. Mixed hyperlipidemia  ***    4. Aortic atherosclerosis: see CT scan 7/18; stable 8/20; stable 8/21  ***    5. Pulmonary hypertension, unspecified: see ECHO 2019  ***    6. Aneurysm of ascending aorta without rupture  ***    7. Gastroesophageal reflux disease without esophagitis  ***    8. Age-related osteoporosis; see DEXA 2019 also 2021  ***    9. Chronic bilateral thoracic back pain  ***      Provided Joyclyn with a 5-10 year written screening schedule and personal prevention plan. Recommendations were developed using the USPSTF age appropriate recommendations. Education, counseling, and referrals were provided as needed. After Visit Summary printed and given to patient which includes a list of additional screenings\tests needed.    No follow-ups on file.    Tonya Sanchez PA-C    Advance Care Planning

## 2024-08-13 ENCOUNTER — OFFICE VISIT (OUTPATIENT)
Dept: FAMILY MEDICINE | Facility: CLINIC | Age: 79
End: 2024-08-13
Payer: MEDICARE

## 2024-08-13 VITALS
WEIGHT: 174.19 LBS | HEIGHT: 60 IN | SYSTOLIC BLOOD PRESSURE: 124 MMHG | OXYGEN SATURATION: 97 % | HEART RATE: 83 BPM | DIASTOLIC BLOOD PRESSURE: 62 MMHG | BODY MASS INDEX: 34.2 KG/M2

## 2024-08-13 DIAGNOSIS — I70.0 AORTIC ATHEROSCLEROSIS: ICD-10-CM

## 2024-08-13 DIAGNOSIS — I27.20 PULMONARY HYPERTENSION, UNSPECIFIED: ICD-10-CM

## 2024-08-13 DIAGNOSIS — Z00.00 ENCOUNTER FOR PREVENTIVE HEALTH EXAMINATION: Primary | ICD-10-CM

## 2024-08-13 DIAGNOSIS — H25.13 NUCLEAR SCLEROSIS OF BOTH EYES: ICD-10-CM

## 2024-08-13 DIAGNOSIS — M25.512 CHRONIC PAIN OF BOTH SHOULDERS: ICD-10-CM

## 2024-08-13 DIAGNOSIS — E78.2 MIXED HYPERLIPIDEMIA: ICD-10-CM

## 2024-08-13 DIAGNOSIS — F51.01 PRIMARY INSOMNIA: ICD-10-CM

## 2024-08-13 DIAGNOSIS — M54.6 CHRONIC BILATERAL THORACIC BACK PAIN: ICD-10-CM

## 2024-08-13 DIAGNOSIS — M25.511 CHRONIC PAIN OF BOTH SHOULDERS: ICD-10-CM

## 2024-08-13 DIAGNOSIS — R91.8 MULTIPLE LUNG NODULES ON CT: ICD-10-CM

## 2024-08-13 DIAGNOSIS — J84.9 ILD (INTERSTITIAL LUNG DISEASE): ICD-10-CM

## 2024-08-13 DIAGNOSIS — G89.29 CHRONIC BILATERAL THORACIC BACK PAIN: ICD-10-CM

## 2024-08-13 DIAGNOSIS — K57.50 DIVERTICULOSIS OF BOTH SMALL AND LARGE INTESTINE WITHOUT BLEEDING: ICD-10-CM

## 2024-08-13 DIAGNOSIS — K21.9 GASTROESOPHAGEAL REFLUX DISEASE WITHOUT ESOPHAGITIS: ICD-10-CM

## 2024-08-13 DIAGNOSIS — G89.29 CHRONIC PAIN OF BOTH SHOULDERS: ICD-10-CM

## 2024-08-13 DIAGNOSIS — I71.21 ANEURYSM OF ASCENDING AORTA WITHOUT RUPTURE: ICD-10-CM

## 2024-08-13 DIAGNOSIS — M81.0 AGE-RELATED OSTEOPOROSIS WITHOUT CURRENT PATHOLOGICAL FRACTURE: ICD-10-CM

## 2024-08-13 PROCEDURE — 3074F SYST BP LT 130 MM HG: CPT | Mod: CPTII,S$GLB,, | Performed by: NURSE PRACTITIONER

## 2024-08-13 PROCEDURE — 1158F ADVNC CARE PLAN TLK DOCD: CPT | Mod: CPTII,S$GLB,, | Performed by: NURSE PRACTITIONER

## 2024-08-13 PROCEDURE — G0439 PPPS, SUBSEQ VISIT: HCPCS | Mod: S$GLB,,, | Performed by: NURSE PRACTITIONER

## 2024-08-13 PROCEDURE — 99999 PR PBB SHADOW E&M-EST. PATIENT-LVL IV: CPT | Mod: PBBFAC,,, | Performed by: NURSE PRACTITIONER

## 2024-08-13 PROCEDURE — 1170F FXNL STATUS ASSESSED: CPT | Mod: CPTII,S$GLB,, | Performed by: NURSE PRACTITIONER

## 2024-08-13 PROCEDURE — 1160F RVW MEDS BY RX/DR IN RCRD: CPT | Mod: CPTII,S$GLB,, | Performed by: NURSE PRACTITIONER

## 2024-08-13 PROCEDURE — 3078F DIAST BP <80 MM HG: CPT | Mod: CPTII,S$GLB,, | Performed by: NURSE PRACTITIONER

## 2024-08-13 PROCEDURE — 1101F PT FALLS ASSESS-DOCD LE1/YR: CPT | Mod: CPTII,S$GLB,, | Performed by: NURSE PRACTITIONER

## 2024-08-13 PROCEDURE — 3288F FALL RISK ASSESSMENT DOCD: CPT | Mod: CPTII,S$GLB,, | Performed by: NURSE PRACTITIONER

## 2024-08-13 PROCEDURE — 1159F MED LIST DOCD IN RCRD: CPT | Mod: CPTII,S$GLB,, | Performed by: NURSE PRACTITIONER

## 2024-08-13 PROCEDURE — 1126F AMNT PAIN NOTED NONE PRSNT: CPT | Mod: CPTII,S$GLB,, | Performed by: NURSE PRACTITIONER

## 2024-08-13 NOTE — PATIENT INSTRUCTIONS
[facility] DEPARTMENT  You should receive a gift card from [facility] for completing the medicare wellness visit. You can try calling # [phone] ([facility] Send Word Now department) to ask about your giftcard. Please wait 1 week to call to ensure that [facility] has received documentation for proof of medicare visit.    Counseling and Referral of Other Preventative  (Italic type indicates deductible and co-insurance are waived)    Patient Name: Ross Rodriguez  Today's Date: 8/13/2024    Health Maintenance       Date Due Completion Date    COVID-19 Vaccine (7 - 2023-24 season) 08/28/2024 (Originally 5/15/2024) 1/15/2024    Influenza Vaccine (1) 09/01/2024 10/3/2023    Hemoglobin A1c (Prediabetes) 11/08/2024 11/8/2023    DEXA Scan 12/07/2025 12/7/2023    Override on 1/24/2012: Done    TETANUS VACCINE 06/21/2026 6/21/2016    Lipid Panel 11/08/2028 11/8/2023        No orders of the defined types were placed in this encounter.    The following information is provided to all patients.  This information is to help you find resources for any of the problems found today that may be affecting your health:                  Living healthy guide: www.[facility].[place].gov      Understanding Diabetes: www.diabetes.org      Eating healthy: www.cdc.gov/healthyweight      CDC home safety checklist: www.cdc.gov/steadi/patient.html      Agency on Aging: www.goea.[place].gov      Alcoholics anonymous (AA): www.aa.org      Physical Activity: www.lonnie.nih.gov/jq5uvup      Tobacco use: www.quitwithusla.org

## 2024-08-13 NOTE — PROGRESS NOTES
Ross Rodriguez presented for a  Medicare AWV and comprehensive Health Risk Assessment today. The following components were reviewed and updated:    Medical history  Family History  Social history  Allergies and Current Medications  Health Risk Assessment  Health Maintenance  Care Team         ** See Completed Assessments for Annual Wellness Visit within the encounter summary.**         The following assessments were completed:  Living Situation  CAGE  Depression Screening  Timed Get Up and Go  Whisper Test  Cognitive Function Screening      Nutrition Screening  ADL Screening  PAQ Screening      Opioid documentation for eAWV      Patient does not have a current opioid prescription.        Review for Substance Use Disorders: Patient does not use substance        Current Outpatient Medications:     acetaminophen (TYLENOL) 500 mg Cap, , Disp: , Rfl:     calcium citrate-vitamin D2 1,500-200 mg-unit Tab, , Disp: , Rfl:     clotrimazole-betamethasone 1-0.05% (LOTRISONE) cream, Apply topically 2 (two) times daily., Disp: 45 g, Rfl: 0    denosumab (PROLIA) 60 mg/mL Syrg, Inject 60 mg into the skin every 6 (six) months., Disp: , Rfl:     fish oil-omega-3 fatty acids 300-1,000 mg capsule, Take 2 g by mouth once daily. Takes 2 times daily, Disp: , Rfl:     FLAXSEED ORAL, Take 1 application by mouth once daily at 6am. Add seeds to oatmeal, Disp: , Rfl:     gabapentin (NEURONTIN) 300 MG capsule, Take 1 capsule (300 mg total) by mouth 3 (three) times daily., Disp: 90 capsule, Rfl: 11    lactobacillus combo no.11 (PROBIOTIC) 15 billion cell CpSP, , Disp: , Rfl:     LORazepam (ATIVAN) 0.5 MG tablet, Take 1 tablet (0.5 mg total) by mouth as needed for Anxiety (May take 1-2 x weekly for anxiety or for travel)., Disp: 30 tablet, Rfl: 0    metoprolol succinate (TOPROL-XL) 25 MG 24 hr tablet, Take 1 tablet (25 mg total) by mouth once daily., Disp: 90 tablet, Rfl: 3    multivitamin (THERAGRAN) per tablet, Take 1 tablet by mouth once  daily., Disp: , Rfl:     rosuvastatin (CRESTOR) 10 MG tablet, Take 1 tablet (10 mg total) by mouth once daily., Disp: 90 tablet, Rfl: 3       Vitals:    08/13/24 1303   BP: 124/62   Pulse: 83   SpO2: 97%   Weight: 79 kg (174 lb 2.6 oz)   Height: 5' (1.524 m)   PainSc: 0-No pain      Physical Exam  Vitals reviewed.   Constitutional:       General: She is not in acute distress.     Appearance: Normal appearance. She is not ill-appearing.   HENT:      Head: Normocephalic and atraumatic.      Mouth/Throat:      Mouth: Mucous membranes are moist.   Eyes:      General: No scleral icterus.        Right eye: No discharge.         Left eye: No discharge.      Extraocular Movements: Extraocular movements intact.      Conjunctiva/sclera: Conjunctivae normal.   Cardiovascular:      Rate and Rhythm: Normal rate.   Pulmonary:      Effort: Pulmonary effort is normal. No respiratory distress.   Musculoskeletal:      Cervical back: Normal range of motion.   Skin:     General: Skin is warm and dry.   Neurological:      Mental Status: She is alert and oriented to person, place, and time.   Psychiatric:         Mood and Affect: Mood normal.         Behavior: Behavior normal. Behavior is cooperative.         Cognition and Memory: Cognition and memory normal.               Diagnoses and health risks identified today and associated recommendations/orders:    1. Encounter for preventive health examination  - Chart reviewed. Problem list updated. Discussed current medical diagnosis, current medications, medical/surgical/family/social history; updated provider list; documented vital signs; identified any cognitive impairment; and updated risk factor list. Addressed any outstanding health maintenance. Provided patient with personalized health advice. Continue to follow up with PCP and any specialists.      2. Pulmonary hypertension, unspecified: see ECHO 2019  Chronic; stable on current treatment plan; follow up with PCP  -follow up with  cardiology     3. ILD (interstitial lung disease): see CT 2019, also 2020; stable 8/21; stable 9/22 and 9/23  Chronic; stable on current treatment plan; follow up with PCP  - not currently following with pulmonology; no inhaler use at this time     4. Aneurysm of ascending aorta without rupture  Chronic; stable on current treatment plan; follow up with PCP  -follow up with  cardiology, taking metoprolol     5. Aortic atherosclerosis: see CT scan 7/18; stable 8/20; stable 8/21  Chronic; stable on current treatment plan; follow up with PCP  -follow up with cardiology, taking statin    6. Nuclear sclerosis of both eyes  Chronic; stable on current treatment plan; follow up with PCP  -follow up with  optometry    7. Multiple lung nodules on CT: stable on CT 9/23  Chronic; stable on current treatment plan; follow up with PCP  - not currently following with pulmonology  - recommend surveillance monitoring by PCP    8. Mixed hyperlipidemia  Chronic; stable on current treatment plan; follow up with PCP  - taking statin    9. Gastroesophageal reflux disease without esophagitis  Chronic; stable on current treatment plan; follow up with PCP  -not taking ppi     10. Age-related osteoporosis; see DEXA 2019 also 2021  Chronic; stable on current treatment plan; follow up with PCP  -taking prolia and calcium/vitamin D    11. Chronic bilateral thoracic back pain  Chronic; stable on current treatment plan; follow up with PCP  -follow up with  pain management, taking gabapentin    12. Primary insomnia  Chronic; stable on current treatment plan; follow up with PCP  - not currently requiring medication for sleep  - she is taking gabapentin nightly for neuropathy     13. Diverticulosis of small and large intestine see CT scan 7/15  Chronic; stable on current treatment plan; follow up with PCP  -taking probiotic    14. Chronic pain of both shoulders  Chronic; stable on current treatment plan; follow up with PCP  -follow up with pain  management, taking gabapentin    15. BMI 34.0-34.9,adult  - Recommendation for healthy diet and increasing exercise as tolerated with goal of 150min/week . Recommend weight loss        Provided Joyclyn with a 5-10 year written screening schedule and personal prevention plan. Recommendations were developed using the USPSTF age appropriate recommendations. Education, counseling, and referrals were provided as needed. After Visit Summary printed and given to patient which includes a list of additional screenings\tests needed.    Follow up in about 1 year (around 8/13/2025) for your next medicare wellness visit.    Monik Parks, FNP-C    Advance Care Planning       I offered to discuss advanced care planning, including how to pick a person who would make decisions for you if you were unable to make them for yourself, called a health care power of , and what kind of decisions you might make such as use of life sustaining treatments such as ventilators and tube feeding when faced with a life limiting illness recorded on a living will that they will need to know. (How you want to be cared for as you near the end of your natural life)     X Patient is interested in learning more about how to make advanced directives.  I provided them paperwork and offered to discuss this with them.

## 2024-09-22 NOTE — TELEPHONE ENCOUNTER
Care Due:                  Date            Visit Type   Department     Provider  --------------------------------------------------------------------------------                                MYCHART                              ANNUAL                              CHECKUP/PHY  [facility] INTERNAL  Last Visit: 11-      S            MEDICINE       Nahomi Nolasco                              [place]                              ANNUAL                              CHECKUP/PHY  [facility] INTERNAL  Next Visit: 11-      S            MEDICINE       Nahomi Nolasco                                                            Last  Test          Frequency    Reason                     Performed    Due Date  --------------------------------------------------------------------------------    CMP.........  12 months..  rosuvastatin.............  11- 11-    Lipid Panel.  12 months..  rosuvastatin.............  11- 11-    [employer] Embedded Care Due Messages. Reference number: [id number].   9/22/2024 10:28:26 AM CDT

## 2024-09-23 ENCOUNTER — TELEPHONE (OUTPATIENT)
Dept: CARDIOTHORACIC SURGERY | Facility: CLINIC | Age: 79
End: 2024-09-23
Payer: MEDICARE

## 2024-09-23 RX ORDER — ROSUVASTATIN CALCIUM 10 MG/1
10 TABLET, COATED ORAL
Qty: 90 TABLET | Refills: 0 | Status: SHIPPED | OUTPATIENT
Start: 2024-09-23

## 2024-09-23 NOTE — TELEPHONE ENCOUNTER
Attempted to call pt to confirm 9/24 appt with Dr. Mcdaniel. No answer, LVM with appt times and locations. Contact number was provided.

## 2024-09-23 NOTE — TELEPHONE ENCOUNTER
Provider Staff:  Action required for this patient    Requires labs      Please see care gap opportunities below in Care Due Message.    Thanks!  [facility]     Appointments      Date Provider   Last Visit   Visit date not found Nahomi Nolasco MD   Next Visit   11/19/2024 Nahomi Nolasco MD     Refill Decision Note   Ross Rodriguez  is requesting a refill authorization.  Brief Assessment and Rationale for Refill:  Approve     Medication Therapy Plan:         Comments:     Note composed:6:54 AM 09/23/2024

## 2024-09-23 NOTE — PROGRESS NOTES
Subjective:      Patient ID: Ross Rodriguez is a 79 y.o. female.    Chief Complaint: No chief complaint on file.      HPI:  Ross Rodriguez is a 79 y.o. female who presents for follow up TAA ~4.8cm. She has a medical history significant for hyperlipidemia, known TAA, and GERD. During her last clinic visit 9/21/23, she was noted to have a stable TAA measuring 4.8cm. this is her 11th visit with us. She presents today report she is doing well and has not complaints.       Family and social history reviewed    Review of patient's allergies indicates:   Allergen Reactions    Fosamax [alendronate] Nausea Only    Nsaids (non-steroidal anti-inflammatory drug) Hives    Sulfa (sulfonamide antibiotics) Hives     Past Medical History:   Diagnosis Date    Allergy     Anxiety     Aortic atherosclerosis: see CT scan 7/18 8/21/2018    Arthritis     Cataract     Depression     Diverticulosis 7/21/2015    GERD (gastroesophageal reflux disease)     Hyperlipidemia     Joint pain     Liver cyst: see CT 2009 6/21/2016    Lower back pain 7/29/2019    Lung cyst: R side see CT 2009 6/21/2016    Nuclear sclerosis - Both Eyes 2/14/2014    Osteoporosis     Primary insomnia 6/21/2016    Primary insomnia 6/21/2016     Past Surgical History:   Procedure Laterality Date    CHOLECYSTECTOMY      DILATION AND CURETTAGE OF UTERUS      WISDOM TOOTH EXTRACTION       Family History       Problem Relation (Age of Onset)    COPD Father    Cancer Father, Paternal Grandmother    Diabetes Father    Hearing loss Father    Heart disease Mother, Father    [place]'s disease Paternal Grandfather    Kidney disease Father    No Known Problems Son    Skin cancer Paternal Aunt    Stroke Mother    Thrombosis Maternal Grandfather          Social History     Socioeconomic History    Marital status:     Number of children: 1   Occupational History    Occupation: retired    Tobacco Use    Smoking status: Never    Smokeless tobacco: Never   Substance and Sexual  Activity    Alcohol use: Yes     Comment: occasional, 1 drink every 3 months    Drug use: Never    Sexual activity: Not Currently     Birth control/protection: Post-menopausal     Social Determinants of Health     Financial Resource Strain: Low Risk  (8/13/2024)    Overall Financial Resource Strain (CARDIA)     Difficulty of Paying Living Expenses: Not hard at all   Food Insecurity: No Food Insecurity (8/13/2024)    Hunger Vital Sign     Worried About Running Out of Food in the Last Year: Never true     Ran Out of Food in the Last Year: Never true   Transportation Needs: No Transportation Needs (8/13/2024)    PRAPARE - Transportation     Lack of Transportation (Medical): No     Lack of Transportation (Non-Medical): No   Physical Activity: Insufficiently Active (8/13/2024)    Exercise Vital Sign     Days of Exercise per Week: 3 days     Minutes of Exercise per Session: 40 min   Stress: No Stress Concern Present (8/13/2024)    [employer] - Occupational Stress Questionnaire     Feeling of Stress : Only a little   Housing Stability: Low Risk  (8/13/2024)    Housing Stability Vital Sign     Unable to Pay for Housing in the Last Year: No     Homeless in the Last Year: No       Current medications Reviewed    Review of Systems   Constitutional:  Negative for activity change.   HENT:  Negative for nosebleeds.    Eyes:  Negative for visual disturbance.   Respiratory:  Negative for shortness of breath.    Cardiovascular:  Negative for chest pain.   Gastrointestinal:  Negative for nausea.   Musculoskeletal:  Negative for gait problem.   Skin:  Negative for color change.   Neurological:  Negative for dizziness.   Hematological:  Does not bruise/bleed easily.   Psychiatric/Behavioral:  Negative for sleep disturbance.      Objective:   Physical Exam  Constitutional:       General: She is not in acute distress.  HENT:      Head: Normocephalic and atraumatic.   Eyes:      Pupils: Pupils are equal,  round, and reactive to light.   Cardiovascular:      Rate and Rhythm: Normal rate.   Pulmonary:      Effort: Pulmonary effort is normal. No respiratory distress.   Musculoskeletal:         General: Normal range of motion.      Cervical back: Normal range of motion.   Skin:     Coloration: Skin is not pale.   Neurological:      General: No focal deficit present.      Mental Status: She is alert.   Psychiatric:         Mood and Affect: Mood normal.         Behavior: Behavior normal.         Diagnostic Results: reviewed   CT Chest from today independently reviewed     CT Chest: 3/14/2023  1. No significant interval change.  Stable fusiform aneurysmal dilatation of the ascending and arch of the aorta.  Findings likely stable allowing for differences in technique and for inter-and intra-observer variability.  2. Mild mid-lower lung zones fibrotic changes.  3.  Other findings are as above.  Assessment:   TAA ~4.9cm   Plan:   CTS Attending Note:    I have personally taken the history and examined this patient and agree with the NISH's note as stated above.  79-year-old woman who I have followed for quite some time for ascending aortic aneurysm.  I was able to review the study from today.  Although the official report is not yet available, by my measurement her ascending aorta remains stable in size at roughly 4.8 cm in diameter.  I will plan to see her back in 1 year with a repeat CT scan.

## 2024-09-24 ENCOUNTER — OFFICE VISIT (OUTPATIENT)
Dept: CARDIOTHORACIC SURGERY | Facility: CLINIC | Age: 79
End: 2024-09-24
Payer: MEDICARE

## 2024-09-24 ENCOUNTER — HOSPITAL ENCOUNTER (OUTPATIENT)
Dept: RADIOLOGY | Facility: HOSPITAL | Age: 79
Discharge: HOME | End: 2024-09-24
Attending: THORACIC SURGERY (CARDIOTHORACIC VASCULAR SURGERY)
Payer: MEDICARE

## 2024-09-24 VITALS
HEIGHT: 60 IN | SYSTOLIC BLOOD PRESSURE: 175 MMHG | DIASTOLIC BLOOD PRESSURE: 73 MMHG | BODY MASS INDEX: 31.49 KG/M2 | HEART RATE: 63 BPM | WEIGHT: 160.38 LBS | OXYGEN SATURATION: 99 %

## 2024-09-24 DIAGNOSIS — I71.21 ANEURYSM OF ASCENDING AORTA WITHOUT RUPTURE: ICD-10-CM

## 2024-09-24 DIAGNOSIS — I71.21 ANEURYSM OF ASCENDING AORTA WITHOUT RUPTURE: Primary | ICD-10-CM

## 2024-09-24 PROCEDURE — 3077F SYST BP >= 140 MM HG: CPT | Mod: CPTII,S$GLB,, | Performed by: THORACIC SURGERY (CARDIOTHORACIC VASCULAR SURGERY)

## 2024-09-24 PROCEDURE — 99999 PR PBB SHADOW E&M-EST. PATIENT-LVL III: CPT | Mod: PBBFAC,,, | Performed by: THORACIC SURGERY (CARDIOTHORACIC VASCULAR SURGERY)

## 2024-09-24 PROCEDURE — 71250 CT THORAX DX C-: CPT | Mod: 26,,, | Performed by: STUDENT IN AN ORGANIZED HEALTH CARE EDUCATION/TRAINING PROGRAM

## 2024-09-24 PROCEDURE — 1126F AMNT PAIN NOTED NONE PRSNT: CPT | Mod: CPTII,S$GLB,, | Performed by: THORACIC SURGERY (CARDIOTHORACIC VASCULAR SURGERY)

## 2024-09-24 PROCEDURE — 71250 CT THORAX DX C-: CPT | Mod: TC

## 2024-09-24 PROCEDURE — 1159F MED LIST DOCD IN RCRD: CPT | Mod: CPTII,S$GLB,, | Performed by: THORACIC SURGERY (CARDIOTHORACIC VASCULAR SURGERY)

## 2024-09-24 PROCEDURE — 99213 OFFICE O/P EST LOW 20 MIN: CPT | Mod: S$GLB,,, | Performed by: THORACIC SURGERY (CARDIOTHORACIC VASCULAR SURGERY)

## 2024-09-24 PROCEDURE — 3078F DIAST BP <80 MM HG: CPT | Mod: CPTII,S$GLB,, | Performed by: THORACIC SURGERY (CARDIOTHORACIC VASCULAR SURGERY)

## 2024-09-24 NOTE — LETTER
September 24, 2024        Nahomi Nolasco MD  [address]  [facility] [address]             [facility] 2nd Fl  [address]  [facility] [address]  Phone: [phone]   Patient: Ross Rodriguez   MR Number: [id number]   YOB: 1945   Date of Visit: 9/24/2024       Dear Dr. Nolasco:    Thank you for referring Ross Rodriguez to me for evaluation. Below are the relevant portions of my assessment and plan of care.            If you have questions, please do not hesitate to call me. I look forward to following Ross along with you.    Sincerely,      Armando Mcdaniel MD           CC  No Recipients

## 2024-11-12 ENCOUNTER — LAB VISIT (OUTPATIENT)
Dept: LAB | Facility: HOSPITAL | Age: 79
End: 2024-11-12
Attending: INTERNAL MEDICINE
Payer: MEDICARE

## 2024-11-12 DIAGNOSIS — R73.01 IFG (IMPAIRED FASTING GLUCOSE): ICD-10-CM

## 2024-11-12 DIAGNOSIS — R53.83 FATIGUE, UNSPECIFIED TYPE: ICD-10-CM

## 2024-11-12 DIAGNOSIS — E78.2 MIXED HYPERLIPIDEMIA: ICD-10-CM

## 2024-11-12 LAB
ALBUMIN SERPL BCP-MCNC: 4.5 G/DL (ref 3.5–5.2)
ALP SERPL-CCNC: 40 U/L (ref 38–126)
ALT SERPL W/O P-5'-P-CCNC: 21 U/L (ref 10–44)
ANION GAP SERPL CALC-SCNC: 7 MMOL/L (ref 8–16)
AST SERPL-CCNC: 24 U/L (ref 15–46)
BASOPHILS # BLD AUTO: 0.03 K/UL (ref 0–0.2)
BASOPHILS NFR BLD: 0.5 % (ref 0–1.9)
BILIRUB SERPL-MCNC: 0.5 MG/DL (ref 0.1–1)
CALCIUM SERPL-MCNC: 9.4 MG/DL (ref 8.7–10.5)
CHLORIDE SERPL-SCNC: 105 MMOL/L (ref 95–110)
CHOLEST SERPL-MCNC: 149 MG/DL (ref 120–199)
CHOLEST/HDLC SERPL: 2.7 {RATIO} (ref 2–5)
CO2 SERPL-SCNC: 28 MMOL/L (ref 23–29)
CREAT SERPL-MCNC: 0.83 MG/DL (ref 0.5–1.4)
DIFFERENTIAL METHOD BLD: NORMAL
EOSINOPHIL # BLD AUTO: 0.1 K/UL (ref 0–0.5)
EOSINOPHIL NFR BLD: 2 % (ref 0–8)
ERYTHROCYTE [DISTWIDTH] IN BLOOD BY AUTOMATED COUNT: 13 % (ref 11.5–14.5)
EST. GFR  (NO RACE VARIABLE): >60 ML/MIN/1.73 M^2
ESTIMATED AVG GLUCOSE: 111 MG/DL (ref 68–131)
GLUCOSE SERPL-MCNC: 95 MG/DL (ref 70–110)
HBA1C MFR BLD: 5.5 % (ref 4–5.6)
HCT VFR BLD AUTO: 37.7 % (ref 37–48.5)
HDLC SERPL-MCNC: 56 MG/DL (ref 40–75)
HDLC SERPL: 37.6 % (ref 20–50)
HGB BLD-MCNC: 12.4 G/DL (ref 12–16)
IMM GRANULOCYTES # BLD AUTO: 0.01 K/UL (ref 0–0.04)
IMM GRANULOCYTES NFR BLD AUTO: 0.2 % (ref 0–0.5)
LDLC SERPL CALC-MCNC: 65.4 MG/DL (ref 63–159)
LYMPHOCYTES # BLD AUTO: 2 K/UL (ref 1–4.8)
LYMPHOCYTES NFR BLD: 35.2 % (ref 18–48)
MCH RBC QN AUTO: 29.7 PG (ref 27–31)
MCHC RBC AUTO-ENTMCNC: 32.9 G/DL (ref 32–36)
MCV RBC AUTO: 90 FL (ref 82–98)
MONOCYTES # BLD AUTO: 0.5 K/UL (ref 0.3–1)
MONOCYTES NFR BLD: 8 % (ref 4–15)
NEUTROPHILS # BLD AUTO: 3.1 K/UL (ref 1.8–7.7)
NEUTROPHILS NFR BLD: 54.1 % (ref 38–73)
NONHDLC SERPL-MCNC: 93 MG/DL
NRBC BLD-RTO: 0 /100 WBC
PLATELET # BLD AUTO: 178 K/UL (ref 150–450)
PMV BLD AUTO: 10.5 FL (ref 9.2–12.9)
POTASSIUM SERPL-SCNC: 4.5 MMOL/L (ref 3.5–5.1)
PROT SERPL-MCNC: 7.2 G/DL (ref 6–8.4)
RBC # BLD AUTO: 4.18 M/UL (ref 4–5.4)
SODIUM SERPL-SCNC: 140 MMOL/L (ref 136–145)
TRIGL SERPL-MCNC: 138 MG/DL (ref 30–150)
TSH SERPL DL<=0.005 MIU/L-ACNC: 3.64 UIU/ML (ref 0.4–4)
UUN UR-MCNC: 19 MG/DL (ref 7–17)
WBC # BLD AUTO: 5.63 K/UL (ref 3.9–12.7)

## 2024-11-12 PROCEDURE — 80061 LIPID PANEL: CPT | Performed by: INTERNAL MEDICINE

## 2024-11-12 PROCEDURE — 85025 COMPLETE CBC W/AUTO DIFF WBC: CPT | Mod: PN | Performed by: INTERNAL MEDICINE

## 2024-11-12 PROCEDURE — 84443 ASSAY THYROID STIM HORMONE: CPT | Mod: PN | Performed by: INTERNAL MEDICINE

## 2024-11-12 PROCEDURE — 83036 HEMOGLOBIN GLYCOSYLATED A1C: CPT | Performed by: INTERNAL MEDICINE

## 2024-11-12 PROCEDURE — 36415 COLL VENOUS BLD VENIPUNCTURE: CPT | Mod: PN | Performed by: INTERNAL MEDICINE

## 2024-11-12 PROCEDURE — 80053 COMPREHEN METABOLIC PANEL: CPT | Mod: PN | Performed by: INTERNAL MEDICINE

## 2024-11-19 ENCOUNTER — OFFICE VISIT (OUTPATIENT)
Dept: INTERNAL MEDICINE | Facility: CLINIC | Age: 79
End: 2024-11-19
Payer: MEDICARE

## 2024-11-19 VITALS
BODY MASS INDEX: 31.41 KG/M2 | WEIGHT: 160 LBS | DIASTOLIC BLOOD PRESSURE: 65 MMHG | HEIGHT: 60 IN | SYSTOLIC BLOOD PRESSURE: 125 MMHG

## 2024-11-19 DIAGNOSIS — I71.21 ANEURYSM OF ASCENDING AORTA WITHOUT RUPTURE: ICD-10-CM

## 2024-11-19 DIAGNOSIS — K44.9 HIATAL HERNIA: ICD-10-CM

## 2024-11-19 DIAGNOSIS — J84.9 ILD (INTERSTITIAL LUNG DISEASE): ICD-10-CM

## 2024-11-19 DIAGNOSIS — E66.811 CLASS 1 OBESITY DUE TO EXCESS CALORIES WITH SERIOUS COMORBIDITY AND BODY MASS INDEX (BMI) OF 31.0 TO 31.9 IN ADULT: ICD-10-CM

## 2024-11-19 DIAGNOSIS — Z00.00 ANNUAL PHYSICAL EXAM: Primary | ICD-10-CM

## 2024-11-19 DIAGNOSIS — K57.50 DIVERTICULOSIS OF BOTH SMALL AND LARGE INTESTINE WITHOUT BLEEDING: ICD-10-CM

## 2024-11-19 DIAGNOSIS — E66.09 CLASS 1 OBESITY DUE TO EXCESS CALORIES WITH SERIOUS COMORBIDITY AND BODY MASS INDEX (BMI) OF 31.0 TO 31.9 IN ADULT: ICD-10-CM

## 2024-11-19 DIAGNOSIS — M70.61 TROCHANTERIC BURSITIS OF RIGHT HIP: ICD-10-CM

## 2024-11-19 DIAGNOSIS — K21.9 GASTROESOPHAGEAL REFLUX DISEASE WITHOUT ESOPHAGITIS: ICD-10-CM

## 2024-11-19 DIAGNOSIS — E78.2 MIXED HYPERLIPIDEMIA: ICD-10-CM

## 2024-11-19 DIAGNOSIS — I70.0 AORTIC ATHEROSCLEROSIS: ICD-10-CM

## 2024-11-19 PROBLEM — M25.612 DECREASED ROM OF LEFT SHOULDER: Status: RESOLVED | Noted: 2020-10-05 | Resolved: 2024-11-19

## 2024-11-19 PROBLEM — M54.9 BILATERAL BACK PAIN: Status: RESOLVED | Noted: 2019-07-29 | Resolved: 2024-11-19

## 2024-11-19 PROCEDURE — 3288F FALL RISK ASSESSMENT DOCD: CPT | Mod: CPTII,S$GLB,, | Performed by: INTERNAL MEDICINE

## 2024-11-19 PROCEDURE — 99397 PER PM REEVAL EST PAT 65+ YR: CPT | Mod: S$GLB,,, | Performed by: INTERNAL MEDICINE

## 2024-11-19 PROCEDURE — 3078F DIAST BP <80 MM HG: CPT | Mod: CPTII,S$GLB,, | Performed by: INTERNAL MEDICINE

## 2024-11-19 PROCEDURE — 1125F AMNT PAIN NOTED PAIN PRSNT: CPT | Mod: CPTII,S$GLB,, | Performed by: INTERNAL MEDICINE

## 2024-11-19 PROCEDURE — 1101F PT FALLS ASSESS-DOCD LE1/YR: CPT | Mod: CPTII,S$GLB,, | Performed by: INTERNAL MEDICINE

## 2024-11-19 PROCEDURE — 99999 PR PBB SHADOW E&M-EST. PATIENT-LVL IV: CPT | Mod: PBBFAC,,, | Performed by: INTERNAL MEDICINE

## 2024-11-19 PROCEDURE — 1159F MED LIST DOCD IN RCRD: CPT | Mod: CPTII,S$GLB,, | Performed by: INTERNAL MEDICINE

## 2024-11-19 PROCEDURE — 3074F SYST BP LT 130 MM HG: CPT | Mod: CPTII,S$GLB,, | Performed by: INTERNAL MEDICINE

## 2024-11-19 RX ORDER — ROSUVASTATIN CALCIUM 10 MG/1
10 TABLET, COATED ORAL DAILY
Qty: 90 TABLET | Refills: 3 | Status: SHIPPED | OUTPATIENT
Start: 2024-11-19

## 2024-11-19 RX ORDER — METOPROLOL SUCCINATE 25 MG/1
25 TABLET, EXTENDED RELEASE ORAL DAILY
Qty: 90 TABLET | Refills: 3 | Status: SHIPPED | OUTPATIENT
Start: 2024-11-19

## 2024-11-19 NOTE — PROGRESS NOTES
Patient ID: Ross Rodriguez is a 79 y.o. female.    Chief Complaint: Annual Exam      Assessment:       1. Annual physical exam    2. Hiatal hernia: samll- seen on CT 2024    3. Aneurysm of ascending aorta without rupture    4. Mixed hyperlipidemia    5. Aortic atherosclerosis: see CT scan 7/18; stable 8/20; stable 8/21.  NOT seen 9/24    6. ILD (interstitial lung disease): see CT 2019, also 2020; stable 8/21; stable 9/22 and 9/23    7. Diverticulosis of small and large intestine see CT scan 7/15    8. Gastroesophageal reflux disease without esophagitis    9. Trochanteric bursitis of right hip    10. Class 1 obesity due to excess calories with serious comorbidity and body mass index (BMI) of 31.0 to 31.9 in adult          Plan:           1. Annual physical exam    2. Hiatal hernia: small- seen on CT 2024.  Reviewed.  Diet and lifestyle issues discussed.  Symptoms are not severe.    3. Aneurysm of ascending aorta without rupture- stable, follows regularly with Cardiothoracic surgery.  Continue to work on control of blood pressure and cholesterol    4. Mixed hyperlipidemia- stable on regimen    5. Aortic atherosclerosis: see CT scan 7/18; stable 8/20; stable 8/21.  NOT seen 9/24- appears to be improved, continue current treatment with statin therapy    6. ILD (interstitial lung disease): see CT 2019, also 2020; stable 8/21; stable 9/22 and 9/23- no symptoms whatsoever.  Never smoked.  Overview:  Very minimal, no changes in past 4 ct's  Pfts with no obstruction or restriction and mild diffusion impairment      7. Diverticulosis of small and large intestine see CT scan 7/15- no alarm sx    8. Gastroesophageal reflux disease without esophagitis- very rarely has symptoms.  Cautions reviewed    9. Trochanteric bursitis of right hip- she follows with ortho and pain management for this and has her regimen for home care    10. Class 1 obesity due to excess calories with serious comorbidity and body mass index (BMI) of 31.0 to  31.9 in adult- portion control and slow weight loss recommended with regard to orthopedic issues, she is not sure how much she can accomplish with this but is willing to consider    Other orders  -     metoprolol succinate (TOPROL-XL) 25 MG 24 hr tablet; Take 1 tablet (25 mg total) by mouth once daily.  Dispense: 90 tablet; Refill: 3  -     rosuvastatin (CRESTOR) 10 MG tablet; Take 1 tablet (10 mg total) by mouth once daily.  Dispense: 90 tablet; Refill: 3       Up-to-date with screenings and immunizations  DEXA will be due next year  Keep endocrinology and CTS follow-ups as well as pain clinic and ortho  Anticipate annual follow up with me, sooner with problems in the interim    Subjective:     CHIEF COMPLAINT:  Patient presents today for annual follow-up.    MEDICAL HISTORY:  She has a recently discovered small hiatal hernia on CT, which she was previously unaware of. She also has a small, slow-growing aneurysm that is monitored with yearly CTs, showing minimal growth over time. Small lung nodules have been identified on imaging and are stable; she denies any history of smoking. She has osteoporosis, managed with Prolia for years without side effects. She denies any falls or fractures since her last visit.  She follows with Endocrinology on a regular basis.    MUSCULOSKELETAL:  She reports a recurrence of right hip bursitis. Her last injection for this condition was approximately one year ago, and the pain has recently returned. She expresses the need to schedule an appointment with Dr. Moses for further management.    FAMILY HISTORY:  Her father  in his 90s from bladder cancer, kidney failure, and COPD; he was a smoker since age 14. Her mother  at a younger age due to complications from rheumatic fever and heart disease. Her son has a history of recurring colon polyps requiring ongoing colonoscopies.    MEDICATIONS:  She is currently on cholesterol medication and takes lorazepam as needed for flying  anxiety.    LIFESTYLE AND PREVENTIVE CARE:  She exercises regularly, including gym visits and longer walks on Sundays for a few hours. Her weight has remained stable for an extended period. She is up to date on flu and COVID vaccinations, maintains regular dental exams, and adheres to yearly follow-ups with various specialists, including Dr. Clark, who confirms her stable condition.    IMAGING:  She continues with annual CTs to monitor her aneurysm and lung nodules. Dr. Mcdaniel confirms her stable condition during yearly follow-ups.      ROS:  General: -fever, -chills, -fatigue, -weight gain, -weight loss  Eyes: -vision changes, -redness, -discharge  ENT: -ear pain, -nasal congestion, -sore throat  Cardiovascular: -chest pain, -palpitations, -lower extremity edema  Respiratory: -cough, -shortness of breath  Gastrointestinal: -abdominal pain, -nausea, -vomiting, -diarrhea, -constipation, -blood in stool  Genitourinary: -dysuria, -hematuria, -frequency  Musculoskeletal: +joint pain, -muscle pain  Skin: -rash, -lesion  Neurological: -headache, -dizziness, -numbness, -tingling  Psychiatric: -anxiety, -depression, -sleep difficulty         Patient Active Problem List   Diagnosis    Gastroesophageal reflux disease without esophagitis    Nuclear sclerosis - Both Eyes    Tinnitus    Diverticulosis of small and large intestine see CT scan 7/15    Mixed hyperlipidemia    Lung cyst: R side see CT 2009    Liver cyst: see CT 2009; NOT seen on u/s 2018    Primary insomnia    Age-related osteoporosis; see DEXA 2019 also 2021; stable 2023 on Prolia (started 2017)    Aortic atherosclerosis: see CT scan 7/18; stable 8/20; stable 8/21.  NOT seen 9/24    ILD (interstitial lung disease): see CT 2019, also 2020; stable 8/21; stable 9/22 and 9/23    Chronic pain of both shoulders    Pulmonary hypertension, unspecified: see ECHO 2019    Thoracic aortic aneurysm (TAA)    Multiple lung nodules on CT: stable on CT 9/23; stable 9/24    Hiatal  hernia: shyanne- seen on CT 2024    Trochanteric bursitis of right hip    Class 1 obesity due to excess calories with serious comorbidity and body mass index (BMI) of 31.0 to 31.9 in adult         Objective:      Physical Exam  Vitals and nursing note reviewed.   Constitutional:       Appearance: She is well-developed.   HENT:      Head: Normocephalic and atraumatic.      Right Ear: External ear normal.      Left Ear: External ear normal.      Nose: Nose normal.      Mouth/Throat:      Pharynx: No oropharyngeal exudate.   Eyes:      General: No scleral icterus.     Extraocular Movements: Extraocular movements intact.      Conjunctiva/sclera: Conjunctivae normal.   Neck:      Thyroid: No thyromegaly.      Vascular: No JVD.   Cardiovascular:      Rate and Rhythm: Normal rate and regular rhythm.      Heart sounds: Normal heart sounds. No murmur heard.     No gallop.   Pulmonary:      Effort: Pulmonary effort is normal. No respiratory distress.      Breath sounds: Normal breath sounds. No wheezing.   Abdominal:      General: Bowel sounds are normal. There is no distension.      Palpations: Abdomen is soft. There is no mass.      Tenderness: There is no abdominal tenderness. There is no guarding or rebound.   Musculoskeletal:         General: No tenderness. Normal range of motion.      Cervical back: Normal range of motion and neck supple.   Lymphadenopathy:      Cervical: No cervical adenopathy.   Skin:     General: Skin is warm.      Findings: No erythema or rash.   Neurological:      General: No focal deficit present.      Mental Status: She is alert and oriented to person, place, and time.      Cranial Nerves: No cranial nerve deficit.      Coordination: Coordination normal.   Psychiatric:         Behavior: Behavior normal.         Thought Content: Thought content normal.         Judgment: Judgment normal.             There are no preventive care reminders to display for this patient.       Answers submitted by the  patient for this visit:  Review of Systems Questionnaire (Submitted on 11/13/2024)  activity change: No  unexpected weight change: No  neck pain: No  hearing loss: No  rhinorrhea: No  trouble swallowing: No  eye discharge: No  visual disturbance: No  chest tightness: No  wheezing: No  chest pain: No  palpitations: No  blood in stool: No  constipation: No  vomiting: No  diarrhea: No  polydipsia: No  polyuria: No  difficulty urinating: No  hematuria: No  menstrual problem: No  dysuria: No  joint swelling: Yes  arthralgias: Yes  headaches: No  weakness: No  confusion: No  dysphoric mood: No

## 2024-12-02 ENCOUNTER — HOSPITAL ENCOUNTER (OUTPATIENT)
Dept: RADIOLOGY | Facility: HOSPITAL | Age: 79
Discharge: HOME | End: 2024-12-02
Attending: INTERNAL MEDICINE
Payer: MEDICARE

## 2024-12-02 DIAGNOSIS — Z12.31 SCREENING MAMMOGRAM, ENCOUNTER FOR: ICD-10-CM

## 2024-12-02 PROCEDURE — 77067 SCR MAMMO BI INCL CAD: CPT | Mod: 26,,, | Performed by: RADIOLOGY

## 2024-12-02 PROCEDURE — 77063 BREAST TOMOSYNTHESIS BI: CPT | Mod: 26,,, | Performed by: RADIOLOGY

## 2024-12-02 PROCEDURE — 77063 BREAST TOMOSYNTHESIS BI: CPT | Mod: TC,PN

## 2024-12-09 ENCOUNTER — TELEPHONE (OUTPATIENT)
Dept: PAIN MEDICINE | Facility: CLINIC | Age: 79
End: 2024-12-09
Payer: MEDICARE

## 2024-12-17 ENCOUNTER — OFFICE VISIT (OUTPATIENT)
Dept: OPTOMETRY | Facility: CLINIC | Age: 79
End: 2024-12-17
Payer: COMMERCIAL

## 2024-12-17 DIAGNOSIS — H04.123 BILATERAL DRY EYES: ICD-10-CM

## 2024-12-17 DIAGNOSIS — H25.13 NUCLEAR SCLEROSIS, BILATERAL: ICD-10-CM

## 2024-12-17 DIAGNOSIS — H52.4 BILATERAL PRESBYOPIA: Primary | ICD-10-CM

## 2024-12-17 PROCEDURE — 92015 DETERMINE REFRACTIVE STATE: CPT | Mod: S$GLB,,, | Performed by: OPTOMETRIST

## 2024-12-17 PROCEDURE — 92014 COMPRE OPH EXAM EST PT 1/>: CPT | Mod: S$GLB,,, | Performed by: OPTOMETRIST

## 2024-12-17 PROCEDURE — 99999 PR PBB SHADOW E&M-EST. PATIENT-LVL III: CPT | Mod: PBBFAC,,, | Performed by: OPTOMETRIST

## 2024-12-17 NOTE — PROGRESS NOTES
HPI    78 Y/o female is here for routine eye exam with no C/o about ocular health   Pt states she is seeing better with PAL Bifocal portion when looking at   instead of the distance portion of glasses.   Pt denies pain and discomfort  No f/f    Eye med: Visine OU BID   Last edited by Sherrie Brooke MA on 12/17/2024  1:31 PM.            Assessment /Plan     For exam results, see Encounter Report.    Bilateral presbyopia    Nuclear sclerosis, bilateral    Bilateral dry eyes      New Spectacle Rx given, discussed different options for glasses. RTC 1 year routine eye exam.  2. Educated pt on presence of cataracts and effects on vision. No surgery at this time. Recheck in one year.  3. Recommend artificial tears. 1 drop 2x per day. Chronicity of disease and treatment discussed.

## 2024-12-19 ENCOUNTER — INFUSION (OUTPATIENT)
Dept: INFECTIOUS DISEASES | Facility: HOSPITAL | Age: 79
End: 2024-12-19
Payer: MEDICARE

## 2024-12-19 VITALS
DIASTOLIC BLOOD PRESSURE: 66 MMHG | HEART RATE: 70 BPM | BODY MASS INDEX: 31.6 KG/M2 | HEIGHT: 60 IN | RESPIRATION RATE: 19 BRPM | TEMPERATURE: 98 F | WEIGHT: 160.94 LBS | SYSTOLIC BLOOD PRESSURE: 152 MMHG | OXYGEN SATURATION: 95 %

## 2024-12-19 DIAGNOSIS — M81.0 AGE-RELATED OSTEOPOROSIS WITHOUT CURRENT PATHOLOGICAL FRACTURE: Primary | ICD-10-CM

## 2024-12-19 PROCEDURE — 96372 THER/PROPH/DIAG INJ SC/IM: CPT

## 2024-12-19 PROCEDURE — 63600175 PHARM REV CODE 636 W HCPCS: Mod: JZ,JG | Performed by: NURSE PRACTITIONER

## 2024-12-19 RX ADMIN — DENOSUMAB 60 MG: 60 INJECTION SUBCUTANEOUS at 10:12

## 2024-12-19 NOTE — PROGRESS NOTES
1049 - Patient arrives for Prolia injection - confirms use of calcium and vitamin D supplements and denies dental procedures over past 3 months - will administer per guidelines       Limited head-to-toe assessment due to privacy issues and visit reason though the opportunity was given for patient to express any concerns

## 2024-12-27 RX ORDER — METOPROLOL SUCCINATE 25 MG/1
25 TABLET, EXTENDED RELEASE ORAL
Qty: 90 TABLET | Refills: 3 | Status: SHIPPED | OUTPATIENT
Start: 2024-12-27

## 2024-12-27 NOTE — TELEPHONE ENCOUNTER
No care due was identified.  [facility] Embedded Care Due Messages. Reference number: [id number].   12/27/2024 12:07:41 AM CST

## 2024-12-27 NOTE — TELEPHONE ENCOUNTER
Refill Routing Note   Medication(s) are not appropriate for processing by [facility] for the following reason(s):        Required vitals abnormal    [facility] action(s):  Defer               Appointments  past 12m or future 3m with PCP    Date Provider   Last Visit   11/19/2024 Nahomi Nolasco MD   Next Visit   Visit date not found Nahomi Nolasco MD   ED visits in past 90 days: 0        Note composed:5:43 AM 12/27/2024

## 2025-01-31 ENCOUNTER — TELEPHONE (OUTPATIENT)
Dept: ORTHOPEDICS | Facility: CLINIC | Age: 80
End: 2025-01-31
Payer: MEDICARE

## 2025-01-31 NOTE — TELEPHONE ENCOUNTER
Spoke to patient regarding her appoint with shoulder pain. Stated to patient that her appointment has is scheduled with the wrong provider. Stated to patient that I will schedule her to see Glen Akers for shoulder/hip pain. Patient scheduled for 10:00 am on 02/27/2025. Patient stated thank you for calling. Stated your welcome. Thanks.

## 2025-02-27 ENCOUNTER — HOSPITAL ENCOUNTER (OUTPATIENT)
Dept: RADIOLOGY | Facility: HOSPITAL | Age: 80
Discharge: HOME | End: 2025-02-27
Attending: PHYSICIAN ASSISTANT
Payer: MEDICARE

## 2025-02-27 ENCOUNTER — OFFICE VISIT (OUTPATIENT)
Dept: ORTHOPEDICS | Facility: CLINIC | Age: 80
End: 2025-02-27
Payer: MEDICARE

## 2025-02-27 VITALS — BODY MASS INDEX: 31.54 KG/M2 | WEIGHT: 160.63 LBS | HEIGHT: 60 IN

## 2025-02-27 DIAGNOSIS — M25.551 PAIN OF RIGHT HIP: Primary | ICD-10-CM

## 2025-02-27 DIAGNOSIS — M25.512 LEFT SHOULDER PAIN, UNSPECIFIED CHRONICITY: ICD-10-CM

## 2025-02-27 DIAGNOSIS — M25.551 PAIN OF RIGHT HIP: ICD-10-CM

## 2025-02-27 PROCEDURE — 99999 PR PBB SHADOW E&M-EST. PATIENT-LVL IV: CPT | Mod: PBBFAC,,, | Performed by: PHYSICIAN ASSISTANT

## 2025-02-27 PROCEDURE — 73502 X-RAY EXAM HIP UNI 2-3 VIEWS: CPT | Mod: 26,RT,, | Performed by: RADIOLOGY

## 2025-02-27 PROCEDURE — 73502 X-RAY EXAM HIP UNI 2-3 VIEWS: CPT | Mod: TC,RT

## 2025-02-27 PROCEDURE — 73030 X-RAY EXAM OF SHOULDER: CPT | Mod: 26,LT,, | Performed by: RADIOLOGY

## 2025-02-27 PROCEDURE — 73030 X-RAY EXAM OF SHOULDER: CPT | Mod: TC,LT

## 2025-02-27 RX ORDER — BETAMETHASONE SODIUM PHOSPHATE AND BETAMETHASONE ACETATE 3; 3 MG/ML; MG/ML
6 INJECTION, SUSPENSION INTRA-ARTICULAR; INTRALESIONAL; INTRAMUSCULAR; SOFT TISSUE
Status: COMPLETED | OUTPATIENT
Start: 2025-02-27 | End: 2025-02-27

## 2025-02-27 RX ADMIN — BETAMETHASONE SODIUM PHOSPHATE AND BETAMETHASONE ACETATE 6 MG: 3; 3 INJECTION, SUSPENSION INTRA-ARTICULAR; INTRALESIONAL; INTRAMUSCULAR; SOFT TISSUE at 02:02

## 2025-02-27 NOTE — PROGRESS NOTES
SUBJECTIVE:     Chief Complaint & History of Present Illness:  Ross Rodriguez is a  Established  patient 79 y.o. female who is seen here today with a complaint of    Chief Complaint   Patient presents with    Left Shoulder - Pain    Right Hip - Pain    . History of Present Illness    - Right hip and right shoulder pain follow-up.    - Presents with right hip and right shoulder pain  - Right hip pain localized to trochanteric area  - Pain wakes her up at 4 AM, which she considers her indicator for seeking medical attention  - Pain never fully resolves, but she seeks treatment when it worsens  - Right shoulder pain is worse compared to the left  - Rates current pain as minimal but reports that at its worst, it becomes severe enough to interrupt activities  - Third time experiencing this issue, with the first occurrence in her 40s  - Unsure of exact cause but suggests it may have happened while making the bed, possibly due to an incorrect movement  - Previously used muscle relaxers for pain management  - Received cortisone injections for right hip pain  - Used a TENS unit for pain relief, but current unit is non-functional  - Expresses interest in learning about additional treatments to manage her condition  - Denies having received a cortisone injection on the left side  - Denies any history of aneurysm on the side noted in her medical records    - Cortisone shots for right hip bursitis: Last injection did not provide significant benefit  - TENS unit on the shoulder: Provided some relief  - Exercises for the shoulder: Unclear if beneficial    - 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  she describes the pain as tender and sore.    Review of patient's allergies indicates:   Allergen Reactions    Fosamax  [alendronate] Nausea Only    Nsaids (non-steroidal anti-inflammatory drug) Hives    Sulfa (sulfonamide antibiotics) Hives         Current Medications[1]    Past Medical History:   Diagnosis Date    Allergy     Anxiety     Aortic atherosclerosis: see CT scan 7/18 08/21/2018    Cataract     Class 1 obesity due to excess calories with serious comorbidity and body mass index (BMI) of 31.0 to 31.9 in adult 11/19/2024    Depression     Diverticulosis 07/21/2015    GERD (gastroesophageal reflux disease)     Hyperlipidemia     Joint pain     Liver cyst: see CT 2009 06/21/2016    Lung cyst: R side see CT 2009 06/21/2016    Nuclear sclerosis - Both Eyes 02/14/2014    Osteoporosis     Primary insomnia 06/21/2016    Primary insomnia 06/21/2016       Past Surgical History:   Procedure Laterality Date    CHOLECYSTECTOMY      DILATION AND CURETTAGE OF UTERUS      WISDOM TOOTH EXTRACTION         Vital Signs (Most Recent)  There were no vitals filed for this visit.    Review of Systems:  ROS:  Constitutional: no fever or chills, primary insomnia  Eyes: no visual changes  ENT: no nasal congestion or sore throat  Respiratory: no cough or shortness of breath, positive lung cyst right side, interstitial lung disease multiple lung nodule  Cardiovascular: no chest pain or palpitations, aortic atherosclerosis  pulmonary hypertension, fair resting aortic aneurysm  Gastrointestinal: no nausea or vomiting, tolerating diet, positive for GERD, diverticulosis, liver cyst  Genitourinary: no hematuria or dysuria  Integument/Breast: no rash or pruritis  Hematologic/Lymphatic: no easy bruising or lymphadenopathy  Musculoskeletal: no arthralgias or myalgias, right trochanteric bursitis, chronic pain both shoulders  Neurological: no seizures or tremors  Behavioral/Psych: no auditory or visual hallucinations  Endocrine: no heat or cold intolerance, age-related osteoporosis      OBJECTIVE:     PHYSICAL EXAM:  Height: 5' (152.4 cm) Weight: 72.8 kg (160  lb 9.7 oz), General Appearance: Well nourished, well developed, in no acute distress.  Neurological: Mood & affect are normal.  Shoulder exam:  left  Tenderness: AC joint, lateral acromial  ROM: forward flexion 180/180, extension 45/45, full abduction 180/180, abduction-glenohumeral 90/90, external rotation 50/50, pain at the extremes of mobility  Shoulder Strength: biceps 5/5, triceps 5/5, abduction 5/5, adduction 5/5, external rotation 5/5 with shoulder at side, flexion 5/5, and extension 5/5  negative for tenderness about the glenohumeral joint, positive for tenderness over the acromioclavicular joint, and positive for impingement sign  Stability tests: anterior apprehension test negative and posterior apprehension test negative  Special Tests:Cross-chest abduction: negative and Porter's test: negative       Shoulder exam:  right  Tenderness: none  ROM: forward flexion 180/180, extension 45/45, full abduction 180/180, abduction-glenohumeral 90/90, external rotation 50/50  Shoulder Strength: biceps 5/5, triceps 5/5, abduction 5/5, adduction 5/5, external rotation 5/5 with shoulder at side, flexion 5/5, and extension 5/5  negative for tenderness about the glenohumeral joint, negative for tenderness over the acromioclavicular joint, and negative for impingement sign  Stability tests: anterior apprehension test negative and posterior apprehension test negative  Special Tests:Cross-chest abduction: negative and Porter's test: negative                RADIOGRAPHS:  X-rays the hip taken today films reviewed by me demonstrate no evidence of fracture dislocation joint spaces well maintained no advanced degenerative joint disease    X-rays the shoulder taken today films reviewed by me demonstrate no evidence of fracture dislocation mild arthritic changes noted of the AC joint mild glenohumeral joint space narrowing    ASSESSMENT/PLAN:       ICD-10-CM ICD-9-CM   1. Pain of right hip  M25.551 719.45   2. Left shoulder pain,  unspecified chronicity  M25.512 719.41       Plan: We discussed with the patient at length all the different treatment options available for her left shoulder including anti-inflammatories, acetaminophen, rest, ice, Physical therapy to include strengthening exercise, occasional cortisone injections for temporary relief, arthroscopic surgical repair, and finally shoulder arthroplasty.   Assessment & Plan    M70.61 Trochanteric bursitis, right hip  M25.519 Pain in unspecified shoulder  M19.019 Primary osteoarthritis, unspecified shoulder  Z88.6 Allergy status to analgesic agent  I77.810 Thoracic aortic ectasia    MEDICATIONS:  - Consider taking turmeric supplement (500mg capsule or gummy 3 times daily) for anti-inflammatory effects.    PROCEDURES:  - Recommend cortisone injection for hip bursitis.  - Discussed potential dry needling treatment for shoulder. Ms. Rodriguez expressed interest but wants to investigate further.    FOLLOW UP:  - Contact the office if patient decides to proceed with dry needling for shoulder.    PATIENT INSTRUCTIONS:  - Avoid strenuous activity immediately after cortisone injection.         The risks, benefits, pros, cons, and potential side effects of the procedure were discussed with the patient in detail all questions were answered.  The patient is comfortable and willing to proceed with the procedure. Verbal consent was obtained and the proper joint was identified by the patient and provider      The injection site was identified and the skin was prepared with an ETOH solution. The    right  hip was injected with 1 ml of Celestone and 5 ml Lidocaine under sterile technique. Ross Rodriguez tolerated the procedure well, she was advised to rest the  hip  today, ice and support. she did receive immediate relief of the pain in and about her  hip  she was told this would be short lived and is secondary to the lidocaine. she may have an increase in her discomfort tonight followed by steady  improvement over the next several days. It may take 1-3 weeks following the injection to get the full benefit of the medication.  I will see her back in 6 months. Sooner if she has any problems or concerns.    This note was generated with the assistance of ambient listening technology. Verbal consent was obtained by the patient and accompanying visitor(s) for the recording of patient appointment to facilitate this note. I attest to having reviewed and edited the generated note for accuracy, though some syntax or spelling errors may persist. Please contact the author of this note for any clarification.       [1]   Current Outpatient Medications   Medication Sig Dispense Refill    acetaminophen (TYLENOL) 500 mg Cap       calcium citrate-vitamin D2 1,500-200 mg-unit Tab       clotrimazole-betamethasone 1-0.05% (LOTRISONE) cream Apply topically 2 (two) times daily. 45 g 0    denosumab (PROLIA) 60 mg/mL Syrg Inject 60 mg into the skin every 6 (six) months.      fish oil-omega-3 fatty acids 300-1,000 mg capsule Take 2 g by mouth once daily. Takes 2 times daily      FLAXSEED ORAL Take 1 application by mouth once daily at 6am. Add seeds to oatmeal      gabapentin (NEURONTIN) 300 MG capsule Take 1 capsule (300 mg total) by mouth 3 (three) times daily. 90 capsule 11    lactobacillus combo no.11 (PROBIOTIC) 15 billion cell CpSP       metoprolol succinate (TOPROL-XL) 25 MG 24 hr tablet TAKE 1 TABLET BY MOUTH EVERY DAY 90 tablet 3    multivitamin (THERAGRAN) per tablet Take 1 tablet by mouth once daily.      rosuvastatin (CRESTOR) 10 MG tablet Take 1 tablet (10 mg total) by mouth once daily. 90 tablet 3    LORazepam (ATIVAN) 0.5 MG tablet Take 1 tablet (0.5 mg total) by mouth as needed for Anxiety (May take 1-2 x weekly for anxiety or for travel). 30 tablet 0     No current facility-administered medications for this visit.

## 2025-03-07 ENCOUNTER — NURSE TRIAGE (OUTPATIENT)
Dept: ADMINISTRATIVE | Facility: CLINIC | Age: 80
End: 2025-03-07
Payer: MEDICARE

## 2025-03-07 NOTE — TELEPHONE ENCOUNTER
Pt is calling and states that for the past 2 weeks her right ear has been bothering her. Denies discharge. Denies pain. Pt states ear just feels full. Pt states that she has tried all types of over the counter things to break up the congestion ( peroxide, blowing her nose, using drops in her ear to loosen the wax up) and nothing has helped. Dispo- Seen in office within 3 days. Able to transfer pt to scheduling to make an ENT apnt. Advised to call back with any further concerns.           Reason for Disposition   Ear congestion lasts > 3 days and no improvement after using Care Advice (Exception: Ear congestion is a chronic symptom.)    Additional Information   Negative: Earache lasts > 1 hour   Negative: Pus or cloudy discharge from ear canal   Negative: Patient wants to be seen    Protocols used: Ear - Congestion-A-OH

## 2025-03-24 ENCOUNTER — OFFICE VISIT (OUTPATIENT)
Dept: OTOLARYNGOLOGY | Facility: CLINIC | Age: 80
End: 2025-03-24
Payer: MEDICARE

## 2025-03-24 ENCOUNTER — CLINICAL SUPPORT (OUTPATIENT)
Dept: OTOLARYNGOLOGY | Facility: CLINIC | Age: 80
End: 2025-03-24
Payer: MEDICARE

## 2025-03-24 VITALS
HEART RATE: 67 BPM | SYSTOLIC BLOOD PRESSURE: 154 MMHG | WEIGHT: 159.75 LBS | DIASTOLIC BLOOD PRESSURE: 80 MMHG | BODY MASS INDEX: 31.19 KG/M2

## 2025-03-24 DIAGNOSIS — H93.13 TINNITUS OF BOTH EARS: ICD-10-CM

## 2025-03-24 DIAGNOSIS — H90.3 SENSORINEURAL HEARING LOSS (SNHL) OF BOTH EARS: Primary | ICD-10-CM

## 2025-03-24 DIAGNOSIS — H90.3 SENSORINEURAL HEARING LOSS (SNHL) OF BOTH EARS: ICD-10-CM

## 2025-03-24 DIAGNOSIS — H61.21 IMPACTED CERUMEN OF RIGHT EAR: ICD-10-CM

## 2025-03-24 DIAGNOSIS — H61.20 IMPACTED CERUMEN, UNSPECIFIED LATERALITY: ICD-10-CM

## 2025-03-24 DIAGNOSIS — H93.13 TINNITUS OF BOTH EARS: Primary | ICD-10-CM

## 2025-03-24 PROCEDURE — G0268 REMOVAL OF IMPACTED WAX MD: HCPCS | Mod: S$GLB,,, | Performed by: NURSE PRACTITIONER

## 2025-03-24 PROCEDURE — 3288F FALL RISK ASSESSMENT DOCD: CPT | Mod: CPTII,S$GLB,, | Performed by: NURSE PRACTITIONER

## 2025-03-24 PROCEDURE — 92567 TYMPANOMETRY: CPT | Mod: S$GLB,,,

## 2025-03-24 PROCEDURE — 1160F RVW MEDS BY RX/DR IN RCRD: CPT | Mod: CPTII,S$GLB,, | Performed by: NURSE PRACTITIONER

## 2025-03-24 PROCEDURE — 99999 PR PBB SHADOW E&M-EST. PATIENT-LVL III: CPT | Mod: PBBFAC,,, | Performed by: NURSE PRACTITIONER

## 2025-03-24 PROCEDURE — 3077F SYST BP >= 140 MM HG: CPT | Mod: CPTII,S$GLB,, | Performed by: NURSE PRACTITIONER

## 2025-03-24 PROCEDURE — 1126F AMNT PAIN NOTED NONE PRSNT: CPT | Mod: CPTII,S$GLB,, | Performed by: NURSE PRACTITIONER

## 2025-03-24 PROCEDURE — 1159F MED LIST DOCD IN RCRD: CPT | Mod: CPTII,S$GLB,, | Performed by: NURSE PRACTITIONER

## 2025-03-24 PROCEDURE — 99203 OFFICE O/P NEW LOW 30 MIN: CPT | Mod: 25,S$GLB,, | Performed by: NURSE PRACTITIONER

## 2025-03-24 PROCEDURE — 92557 COMPREHENSIVE HEARING TEST: CPT | Mod: S$GLB,,,

## 2025-03-24 PROCEDURE — 3079F DIAST BP 80-89 MM HG: CPT | Mod: CPTII,S$GLB,, | Performed by: NURSE PRACTITIONER

## 2025-03-24 PROCEDURE — 1101F PT FALLS ASSESS-DOCD LE1/YR: CPT | Mod: CPTII,S$GLB,, | Performed by: NURSE PRACTITIONER

## 2025-03-24 NOTE — PROGRESS NOTES
"Ross Rodriguez, a 79 y.o. female was seen today in the clinic for an audiologic evaluation. The patient's primary complaint was "static" like tinnitus, primarily in the right ear.  She reports no significant concerns with hearing at this time. No history of noise exposure reported.    Otoscopy revealed cerumen impaction. Ms. Rodriguez was seen for ear cleaning then returned to audiology for testing. Pure tone testing revealed normal sloping to moderate sensorineural hearing loss, bilaterally. Speech reception thresholds were obtained at 20 dBHL in the right ear and 25 dBHL in the left ear. Speech discrimination scores were 100% in the right ear and 100% in the left ear. Tympanometry revealed Type As tympanograms in both ears.     Results were reviewed with the patient and the recommendation of a hearing aid consultation was discussed.    Recommendations:  Otologic evaluation  Annual audiologic evaluation  Hearing protection in noise  Hearing aid consultation when patient is ready to consider amplification          "

## 2025-03-24 NOTE — PROCEDURES
Ear Cerumen Removal    Date/Time: 3/24/2025 3:00 PM    Performed by: Sally Preston NP  Authorized by: Sally Preston NP    Consent Done?:  Yes (Verbal)    Local anesthetic:  None  Location details:  Right ear  Procedure type: curette    Procedure type comment:  Suction and alligator forceps  Cerumen  Removal Results:  Cerumen completely removed  Patient tolerance:  Patient tolerated the procedure well with no immediate complications

## 2025-03-24 NOTE — PROGRESS NOTES
Chief Complaint   Patient presents with    Tinnitus     For years    Ear Fullness     Right ear. Denies pain   .     HPI: Patient is a very pleasant 79 y.o. female who is self-referred for evaluation of tinnitus.  She reports tinnitus that has been gradually progressing over the last several year(s).  She  notes that it is primarily bilateral. She  states that the tinnitus does not interfere with communication, concentration, sleep, and enjoyment of life. She also denies hearing loss.  She has not noted any difference in hearing between the ears, with either ear being the better hearing ear. She  has not had any recent issues with ear pain or ear drainage.   She denies any history of significant loud noise exposure. She denies issues with dizziness.      Past Medical History:   Diagnosis Date    Allergy     Anxiety     Aortic atherosclerosis: see CT scan 7/18 08/21/2018    Cataract     Class 1 obesity due to excess calories with serious comorbidity and body mass index (BMI) of 31.0 to 31.9 in adult 11/19/2024    Depression     Diverticulosis 07/21/2015    GERD (gastroesophageal reflux disease)     Hyperlipidemia     Joint pain     Liver cyst: see CT 2009 06/21/2016    Lung cyst: R side see CT 2009 06/21/2016    Nuclear sclerosis - Both Eyes 02/14/2014    Osteoporosis     Primary insomnia 06/21/2016    Primary insomnia 06/21/2016     Social History[1]  Past Surgical History:   Procedure Laterality Date    CHOLECYSTECTOMY      DILATION AND CURETTAGE OF UTERUS      WISDOM TOOTH EXTRACTION       Family History   Problem Relation Name Age of Onset    Stroke Mother d 73     Heart disease Mother d 73         rheumatic fever    Cancer Father d 94         Bladder    COPD Father d 94     Heart disease Father d 94         CHF    Kidney disease Father d 94     Diabetes Father d 94     Hearing loss Father d 94     Colon polyps Son Ty     Skin cancer Paternal Aunt      Thrombosis Maternal Grandfather      Cancer Paternal  Grandmother          stomach    Vernon's disease Paternal Grandfather      Melanoma Neg Hx      Amblyopia Neg Hx      Blindness Neg Hx      Cataracts Neg Hx      Glaucoma Neg Hx      Hypertension Neg Hx      Macular degeneration Neg Hx      Retinal detachment Neg Hx      Strabismus Neg Hx      Thyroid disease Neg Hx             Review of Systems  General: negative for chills, fever or weight loss  Psychological: negative for mood changes or depression  Ophthalmic: negative for blurry vision, photophobia or eye pain  ENT: see HPI  Respiratory: no cough, shortness of breath, or wheezing  Cardiovascular: no chest pain or dyspnea on exertion  Gastrointestinal: no abdominal pain, change in bowel habits, or black/ bloody stools  Musculoskeletal: negative for gait disturbance or muscular weakness  Neurological: no syncope or seizures; no ataxia  Dermatological: negative for puritis,  rash and jaundice  Hematologic/lymphatic: no easy bruising, no new lumps or bumps      Physical Exam:    Vitals:    03/24/25 1422   BP: (!) 154/80   Pulse: 67       Constitutional: Well appearing / communicating without difficutly.  NAD.  Eyes: EOM I Bilaterally  Head/Face: Normocephalic.  Negative paranasal sinus pressure/tenderness.  Salivary glands WNL.  House Brackmann I Bilaterally.    Right Ear: Auricle normal appearance. External Auditory Canal with cerumen impaction. EAC within normal limits no lesions or masses,TM w/o masses/lesions/perforations. TM mobility noted.   Left Ear: Auricle normal appearance. External Auditory Canal within normal limits no lesions or masses,TM w/o masses/lesions/perforations. TM mobility noted.  Nose: No gross nasal septal deviation. Inferior Turbinates 3+ bilaterally. No septal perforation. No masses/lesions. External nasal skin appears normal without masses/lesions.  Oral Cavity: Gingiva/lips within normal limits.  Dentition/gingiva healthy appearing. Mucus membranes moist. Floor of mouth soft, no  masses palpated. Oral Tongue mobile. Hard Palate appears normal.    Oropharynx: Base of tongue appears normal. No masses/lesions noted. Tonsillar fossa/pharyngeal wall without lesions. Posterior oropharynx WNL.  Soft palate without masses. Midline uvula.   Neck/Lymphatic: No LAD I-VI bilaterally.  No thyromegaly.  No masses noted on exam.    Mirror laryngoscopy/nasopharyngoscopy: Active gag reflex.  Unable to perform.    Neuro/Psychiatric: AOx3.  Normal mood and affect.   Cardiovascular: Normal carotid pulses bilaterally, no increasing jugular venous distention noted at cervical region bilaterally.    Respiratory: Normal respiratory effort, no stridor, no retractions noted.      Ear Cerumen Removal under microscopy     Date/Time: 3/24/2025 3:00 PM     Performed by: Sally Preston NP  Authorized by: Sally Preston NP    Consent Done?:  Yes (Verbal)     Local anesthetic:  None  Location details:  Right ear  Procedure type: curette    Procedure type comment:  Suction and alligator forceps  Cerumen  Removal Results:  Cerumen completely removed  Patient tolerance:  Patient tolerated the procedure well with no immediate complications       Diagnostic Studies:  Audiogram reviewed personally by myself and in detail with the patient today.   Pure tone testing revealed normal sloping to moderate sensorineural hearing loss, bilaterally. Speech reception thresholds were obtained at 20 dBHL in the right ear and 25 dBHL in the left ear. Speech discrimination scores were 100% in the right ear and 100% in the left ear. Tympanometry revealed Type As tympanograms in both ears.       Assessment:    ICD-10-CM ICD-9-CM    1. Tinnitus of both ears  H93.13 388.30       2. Sensorineural hearing loss (SNHL) of both ears  H90.3 389.18       3. Impacted cerumen of right ear  H61.21 380.4 Ear Cerumen Removal        The primary encounter diagnosis was Tinnitus of both ears. Diagnoses of Sensorineural hearing loss (SNHL) of both ears and  Impacted cerumen of right ear were also pertinent to this visit.      Plan:  Orders Placed This Encounter   Procedures    Ear Cerumen Removal     -Cerumen impaction:  Removed under microscopy today without difficulty.  I would recommend the use of a wax softening drop, either over the counter Debrox or mineral oil, on a weekly basis.  I also instructed the patient to avoid Qtips.      We reviewed her audiogram together in detail.  We also discussed that tinnitus is most often caused by a hearing loss, and that as the hair cells are damaged, either genetic or as a result of loud noise exposure, they then cause tinnitus.  Tinnitus tends to be louder in times of stress and fatigue, and may decrease with time.  Hearing aids are helpful if patient is a good hearing aid candidate. Sound machines may also be an effective masking technique if needed at night. I will give the patient a comprehensive guide on managing tinnitus today with reference materials to further learn about tinnitus and coping mechanisms.     -We also discussed that she is a good candidate for hearing aids, if and when she the patient is motivated. She is not interested in hearing aids at this time.   -recommend annual audiograms  -follow up 1 year or sooner as needed      Sally Preston NP        Answers submitted by the patient for this visit:  Review of Symptoms Questionnaire  (Submitted on 3/17/2025)  None of these: Yes  sinus pressure : Yes  postnasal drip: Yes  None of these : Yes  Snoring?: Yes  None of these : Yes  Acid Reflux?: Yes  None of these: Yes  Muscle aches / pain?: Yes  back pain: Yes  None of these : Yes  Seasonal Allergies?: Yes  None of these : Yes  Light-headedness: Yes  None of these: Yes  None of these: Yes         [1]   Social History  Socioeconomic History    Marital status:     Number of children: 1   Occupational History    Occupation: retired    Tobacco Use    Smoking status: Never    Smokeless tobacco: Never    Substance and Sexual Activity    Alcohol use: Yes     Comment: occasional, 1 drink every 3 months    Drug use: Never    Sexual activity: Not Currently     Birth control/protection: Post-menopausal     Social Drivers of Health     Financial Resource Strain: Low Risk  (8/13/2024)    Overall Financial Resource Strain (CARDIA)     Difficulty of Paying Living Expenses: Not hard at all   Food Insecurity: No Food Insecurity (8/13/2024)    Hunger Vital Sign     Worried About Running Out of Food in the Last Year: Never true     Ran Out of Food in the Last Year: Never true   Transportation Needs: No Transportation Needs (8/13/2024)    PRAPARE - Transportation     Lack of Transportation (Medical): No     Lack of Transportation (Non-Medical): No   Physical Activity: Insufficiently Active (8/13/2024)    Exercise Vital Sign     Days of Exercise per Week: 3 days     Minutes of Exercise per Session: 40 min   Stress: No Stress Concern Present (8/13/2024)    [employer] [place] [employer] - Occupational Stress Questionnaire     Feeling of Stress : Only a little   Housing Stability: Unknown (8/13/2024)    Housing Stability Vital Sign     Unable to Pay for Housing in the Last Year: No     Homeless in the Last Year: No

## 2025-04-25 NOTE — PROGRESS NOTES
DATE: 5/16/2025  PATIENT: Ross Rodriguez    Attending Physician: Vincent East M.D.    HISTORY:  Ross Rodriguez is a 79 y.o. female who returns to me today for follow up.  She was last seen by me 7/17/2024.  Today she is doing well but notes mild low back pain.  The Patient denies myelopathic symptoms such as handwriting changes or difficulty with buttons/coins/keys. Denies perineal paresthesias, bowel/bladder dysfunction.    EXAM:  Ht 5' (1.524 m)   Wt 73.1 kg (161 lb 2.5 oz)   BMI 31.47 kg/m²     My physical examination was notable for the following findings:     Normal station and gait, no difficulty with toe or heel walk.   Dorsal lumbar skin negative for rashes, lesions, hairy patches and surgical scars. There is mild lumbar tenderness to palpation.  Lumbar range of motion is acceptable.  Global saggital and coronal spinal balance acceptable, not significant for scoliosis and kyphosis.  No pain with the range of motion of the bilateral hips. No trochanteric tenderness to palpation.  Bilateral lower extremities warm and well perfused, dorsalis pedis pulses 2+ bilaterally.  Normal strength and tone in all major motor groups in the bilateral lower extremities. Normal sensation to light touch in the L2-S1 dermatomes bilaterally.  Deep tendon reflexes symmetric 2+ in the bilateral lower extremities.  Negative Babinski bilaterally. Straight leg raise negative bilaterally.    IMAGING:  Today I personally re- reviewed AP, Lat and Flex/Ex  upright L-spine that demonstrate mild DDD at L3/4.       2023 Thoracic MRI shows no spinal canal stenosis or neural foraminal narrowing within the thoracic spine.     2023 Lumbar MRI shows mild spinal canal stenosis at L2-L3 with severe facet arthropathy at L3-L5.     Body mass index is 31.47 kg/m².    Hemoglobin A1C   Date Value Ref Range Status   11/12/2024 5.5 4.0 - 5.6 % Final     Comment:     ADA Screening Guidelines:  5.7-6.4%  Consistent with prediabetes  >or=6.5%   Consistent with diabetes    High levels of fetal hemoglobin interfere with the HbA1C  assay. Heterozygous hemoglobin variants (HbS, HgC, etc)do  not significantly interfere with this assay.   However, presence of multiple variants may affect accuracy.     11/08/2023 5.4 4.0 - 5.6 % Final     Comment:     ADA Screening Guidelines:  5.7-6.4%  Consistent with prediabetes  >or=6.5%  Consistent with diabetes    High levels of fetal hemoglobin interfere with the HbA1C  assay. Heterozygous hemoglobin variants (HbS, HgC, etc)do  not significantly interfere with this assay.   However, presence of multiple variants may affect accuracy.     11/04/2022 5.5 4.0 - 5.6 % Final     Comment:     ADA Screening Guidelines:  5.7-6.4%  Consistent with prediabetes  >or=6.5%  Consistent with diabetes    High levels of fetal hemoglobin interfere with the HbA1C  assay. Heterozygous hemoglobin variants (HbS, HgC, etc)do  not significantly interfere with this assay.   However, presence of multiple variants may affect accuracy.           ASSESSMENT/PLAN:    Ross was seen today for low-back pain and back pain.    Diagnoses and all orders for this visit:    Degeneration of intervertebral disc of lumbar region with discogenic back pain and lower extremity pain  -     gabapentin (NEURONTIN) 300 MG capsule; Take 1 capsule (300 mg total) by mouth every evening.  -     tiZANidine (ZANAFLEX) 4 MG tablet; Take 1 tablet (4 mg total) by mouth every 8 (eight) hours as needed (muscle tension).    Lumbar radiculopathy  -     gabapentin (NEURONTIN) 300 MG capsule; Take 1 capsule (300 mg total) by mouth every evening.  -     tiZANidine (ZANAFLEX) 4 MG tablet; Take 1 tablet (4 mg total) by mouth every 8 (eight) hours as needed (muscle tension).      Today we discussed at length all of the different treatment options including anti-inflammatories, acetaminophen, rest, ice, heat, physical therapy including strengthening and stretching exercises, home  exercises, ROM, aerobic conditioning, aqua therapy, other modalities including ultrasound, massage, and dry needling, epidural steroid injections and finally surgical intervention.    Follow up as needed

## 2025-04-29 ENCOUNTER — TELEPHONE (OUTPATIENT)
Dept: ORTHOPEDICS | Facility: CLINIC | Age: 80
End: 2025-04-29
Payer: MEDICARE

## 2025-04-29 DIAGNOSIS — M51.362 DEGENERATION OF INTERVERTEBRAL DISC OF LUMBAR REGION WITH DISCOGENIC BACK PAIN AND LOWER EXTREMITY PAIN: Primary | ICD-10-CM

## 2025-04-29 NOTE — TELEPHONE ENCOUNTER
Spoke to patient regarding x-ray. Patient aware of x-ray scheduled for 12:00 pm at the [facility] on 05/16/2025. Patient stated thank you. Thanks

## 2025-05-16 ENCOUNTER — OFFICE VISIT (OUTPATIENT)
Dept: ORTHOPEDICS | Facility: CLINIC | Age: 80
End: 2025-05-16
Payer: MEDICARE

## 2025-05-16 ENCOUNTER — HOSPITAL ENCOUNTER (OUTPATIENT)
Dept: RADIOLOGY | Facility: HOSPITAL | Age: 80
Discharge: HOME | End: 2025-05-16
Attending: REGISTERED NURSE
Payer: MEDICARE

## 2025-05-16 VITALS — BODY MASS INDEX: 31.64 KG/M2 | WEIGHT: 161.19 LBS | HEIGHT: 60 IN

## 2025-05-16 DIAGNOSIS — M51.362 DEGENERATION OF INTERVERTEBRAL DISC OF LUMBAR REGION WITH DISCOGENIC BACK PAIN AND LOWER EXTREMITY PAIN: Primary | ICD-10-CM

## 2025-05-16 DIAGNOSIS — M54.16 LUMBAR RADICULOPATHY: ICD-10-CM

## 2025-05-16 DIAGNOSIS — M51.362 DEGENERATION OF INTERVERTEBRAL DISC OF LUMBAR REGION WITH DISCOGENIC BACK PAIN AND LOWER EXTREMITY PAIN: ICD-10-CM

## 2025-05-16 PROCEDURE — 72110 X-RAY EXAM L-2 SPINE 4/>VWS: CPT | Mod: TC

## 2025-05-16 PROCEDURE — 99999 PR PBB SHADOW E&M-EST. PATIENT-LVL III: CPT | Mod: PBBFAC,,, | Performed by: REGISTERED NURSE

## 2025-05-16 PROCEDURE — 72110 X-RAY EXAM L-2 SPINE 4/>VWS: CPT | Mod: 26,,, | Performed by: RADIOLOGY

## 2025-05-16 RX ORDER — TIZANIDINE 4 MG/1
4 TABLET ORAL EVERY 8 HOURS PRN
Qty: 90 TABLET | Refills: 3 | Status: SHIPPED | OUTPATIENT
Start: 2025-05-16

## 2025-05-16 RX ORDER — GABAPENTIN 300 MG/1
300 CAPSULE ORAL NIGHTLY
Qty: 90 CAPSULE | Refills: 3 | Status: SHIPPED | OUTPATIENT
Start: 2025-05-16 | End: 2026-05-16

## 2025-06-11 ENCOUNTER — TELEPHONE (OUTPATIENT)
Dept: FAMILY MEDICINE | Facility: CLINIC | Age: 80
End: 2025-06-11
Payer: MEDICARE

## 2025-06-11 RX ORDER — ROSUVASTATIN CALCIUM 10 MG/1
10 TABLET, COATED ORAL
Qty: 90 TABLET | Refills: 1 | Status: SHIPPED | OUTPATIENT
Start: 2025-06-11

## 2025-06-11 NOTE — TELEPHONE ENCOUNTER
Due for her annual exam me around November or December of this year, please contact her to schedule, usual labs prior, thank you

## 2025-06-11 NOTE — TELEPHONE ENCOUNTER
No care due was identified.  [facility] Embedded Care Due Messages. Reference number: [id number].   6/11/2025 12:05:12 AM CDT

## 2025-06-11 NOTE — TELEPHONE ENCOUNTER
Refill Decision Note   Erikavelvet Rodriguez  is requesting a refill authorization.  Brief Assessment and Rationale for Refill:  Approve     Medication Therapy Plan:         Comments:     Note composed:4:39 AM 06/11/2025
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
## 2025-06-17 ENCOUNTER — OFFICE VISIT (OUTPATIENT)
Dept: ENDOCRINOLOGY | Facility: CLINIC | Age: 80
End: 2025-06-17
Payer: MEDICARE

## 2025-06-17 ENCOUNTER — PATIENT MESSAGE (OUTPATIENT)
Dept: ENDOCRINOLOGY | Facility: CLINIC | Age: 80
End: 2025-06-17

## 2025-06-17 VITALS
DIASTOLIC BLOOD PRESSURE: 60 MMHG | HEIGHT: 60 IN | WEIGHT: 166.13 LBS | HEART RATE: 62 BPM | SYSTOLIC BLOOD PRESSURE: 138 MMHG | BODY MASS INDEX: 32.62 KG/M2

## 2025-06-17 DIAGNOSIS — R93.89 ABNORMAL FINDINGS ON DIAGNOSTIC IMAGING OF OTHER SPECIFIED BODY STRUCTURES: ICD-10-CM

## 2025-06-17 DIAGNOSIS — K21.9 GASTROESOPHAGEAL REFLUX DISEASE WITHOUT ESOPHAGITIS: ICD-10-CM

## 2025-06-17 DIAGNOSIS — E78.2 MIXED HYPERLIPIDEMIA: ICD-10-CM

## 2025-06-17 DIAGNOSIS — M81.0 AGE-RELATED OSTEOPOROSIS WITHOUT CURRENT PATHOLOGICAL FRACTURE: Primary | ICD-10-CM

## 2025-06-17 PROCEDURE — 1159F MED LIST DOCD IN RCRD: CPT | Mod: CPTII,S$GLB,, | Performed by: NURSE PRACTITIONER

## 2025-06-17 PROCEDURE — 3075F SYST BP GE 130 - 139MM HG: CPT | Mod: CPTII,S$GLB,, | Performed by: NURSE PRACTITIONER

## 2025-06-17 PROCEDURE — 1101F PT FALLS ASSESS-DOCD LE1/YR: CPT | Mod: CPTII,S$GLB,, | Performed by: NURSE PRACTITIONER

## 2025-06-17 PROCEDURE — 3288F FALL RISK ASSESSMENT DOCD: CPT | Mod: CPTII,S$GLB,, | Performed by: NURSE PRACTITIONER

## 2025-06-17 PROCEDURE — G2211 COMPLEX E/M VISIT ADD ON: HCPCS | Mod: S$GLB,,, | Performed by: NURSE PRACTITIONER

## 2025-06-17 PROCEDURE — 1160F RVW MEDS BY RX/DR IN RCRD: CPT | Mod: CPTII,S$GLB,, | Performed by: NURSE PRACTITIONER

## 2025-06-17 PROCEDURE — 1126F AMNT PAIN NOTED NONE PRSNT: CPT | Mod: CPTII,S$GLB,, | Performed by: NURSE PRACTITIONER

## 2025-06-17 PROCEDURE — 99999 PR PBB SHADOW E&M-EST. PATIENT-LVL IV: CPT | Mod: PBBFAC,,, | Performed by: NURSE PRACTITIONER

## 2025-06-17 PROCEDURE — 3078F DIAST BP <80 MM HG: CPT | Mod: CPTII,S$GLB,, | Performed by: NURSE PRACTITIONER

## 2025-06-17 PROCEDURE — 99214 OFFICE O/P EST MOD 30 MIN: CPT | Mod: S$GLB,,, | Performed by: NURSE PRACTITIONER

## 2025-06-17 NOTE — Clinical Note
Hi! I ordered a few labs for Ms Rodriguez and had them scheduled a week before her annual with you. Just wanted to loop you in so you can add to them. Alice

## 2025-06-17 NOTE — ASSESSMENT & PLAN NOTE
-- Risks include +FH, PPI therapy.  -- Reviewed basics of quantity versus quality.  -- Reassuring they are not fracturing.  -- Recommend:  -Pharmacological therapy is recommended for patients with osteopenia if the 10 year probability of a hip fracture is >3% and 10 year probability of other major osteoporotic fractures is >20%.  Treatment options and potential side effects discussed for PO bisphosphonates, reclast, Denosumab, and Teriparatide.   -Treatment:   The patient reports taking Fosamax in the past but states she cannot tolerate due to GI upset     Prolia  Start: 11/2017  Last Dose: 12/19/2024    Therapy plan reordered.    -Calcium and Vitamin D RDD provided.  -Exercise: recommended.  -Fall precautions made in the home.  -Alerted that if dental work needs to be done it should be done prior to initiating therapy. Dental health: UTD.  -- Repeat DEXA scan 12/2025.

## 2025-06-23 ENCOUNTER — E-VISIT (OUTPATIENT)
Dept: FAMILY MEDICINE | Facility: CLINIC | Age: 80
End: 2025-06-23
Payer: MEDICARE

## 2025-06-23 ENCOUNTER — PATIENT MESSAGE (OUTPATIENT)
Dept: FAMILY MEDICINE | Facility: CLINIC | Age: 80
End: 2025-06-23

## 2025-06-23 DIAGNOSIS — N30.00 ACUTE CYSTITIS WITHOUT HEMATURIA: Primary | ICD-10-CM

## 2025-06-23 RX ORDER — NITROFURANTOIN 25; 75 MG/1; MG/1
100 CAPSULE ORAL 2 TIMES DAILY
Qty: 10 CAPSULE | Refills: 0 | Status: SHIPPED | OUTPATIENT
Start: 2025-06-23

## 2025-06-24 ENCOUNTER — INFUSION (OUTPATIENT)
Dept: INFECTIOUS DISEASES | Facility: HOSPITAL | Age: 80
End: 2025-06-24
Payer: MEDICARE

## 2025-06-24 VITALS
OXYGEN SATURATION: 98 % | DIASTOLIC BLOOD PRESSURE: 69 MMHG | SYSTOLIC BLOOD PRESSURE: 154 MMHG | HEIGHT: 60 IN | RESPIRATION RATE: 16 BRPM | HEART RATE: 60 BPM | BODY MASS INDEX: 31.09 KG/M2 | WEIGHT: 158.38 LBS | TEMPERATURE: 98 F

## 2025-06-24 DIAGNOSIS — M81.0 AGE-RELATED OSTEOPOROSIS WITHOUT CURRENT PATHOLOGICAL FRACTURE: Primary | ICD-10-CM

## 2025-06-24 PROCEDURE — 63600175 PHARM REV CODE 636 W HCPCS: Mod: JZ,TB | Performed by: NURSE PRACTITIONER

## 2025-06-24 PROCEDURE — 96372 THER/PROPH/DIAG INJ SC/IM: CPT

## 2025-06-24 RX ADMIN — DENOSUMAB 60 MG: 60 INJECTION SUBCUTANEOUS at 11:06

## 2025-07-28 DIAGNOSIS — Z00.00 ENCOUNTER FOR MEDICARE ANNUAL WELLNESS EXAM: ICD-10-CM

## 2025-08-14 ENCOUNTER — TELEPHONE (OUTPATIENT)
Dept: CARDIOTHORACIC SURGERY | Facility: CLINIC | Age: 80
End: 2025-08-14
Payer: MEDICARE

## 2025-08-14 DIAGNOSIS — I71.21 ANEURYSM OF ASCENDING AORTA WITHOUT RUPTURE: Primary | ICD-10-CM

## 2025-08-29 ENCOUNTER — OFFICE VISIT (OUTPATIENT)
Dept: FAMILY MEDICINE | Facility: CLINIC | Age: 80
End: 2025-08-29
Payer: MEDICARE

## 2025-08-29 VITALS
WEIGHT: 159.94 LBS | SYSTOLIC BLOOD PRESSURE: 116 MMHG | HEIGHT: 60 IN | TEMPERATURE: 99 F | BODY MASS INDEX: 31.4 KG/M2 | OXYGEN SATURATION: 98 % | HEART RATE: 72 BPM | DIASTOLIC BLOOD PRESSURE: 62 MMHG

## 2025-08-29 DIAGNOSIS — I27.20 PULMONARY HYPERTENSION, UNSPECIFIED: ICD-10-CM

## 2025-08-29 DIAGNOSIS — Z00.00 ENCOUNTER FOR MEDICARE ANNUAL WELLNESS EXAM: Primary | ICD-10-CM

## 2025-08-29 DIAGNOSIS — I71.20 THORACIC AORTIC ANEURYSM (TAA), UNSPECIFIED PART, UNSPECIFIED WHETHER RUPTURED: ICD-10-CM

## 2025-08-29 DIAGNOSIS — F51.01 PRIMARY INSOMNIA: ICD-10-CM

## 2025-08-29 DIAGNOSIS — I70.0 AORTIC ATHEROSCLEROSIS: ICD-10-CM

## 2025-08-29 DIAGNOSIS — K21.9 GASTROESOPHAGEAL REFLUX DISEASE WITHOUT ESOPHAGITIS: ICD-10-CM

## 2025-08-29 DIAGNOSIS — E78.2 MIXED HYPERLIPIDEMIA: ICD-10-CM

## 2025-08-29 DIAGNOSIS — J84.9 ILD (INTERSTITIAL LUNG DISEASE): ICD-10-CM

## 2025-08-29 DIAGNOSIS — M81.0 AGE-RELATED OSTEOPOROSIS WITHOUT CURRENT PATHOLOGICAL FRACTURE: ICD-10-CM

## 2025-08-29 PROCEDURE — 99999 PR PBB SHADOW E&M-EST. PATIENT-LVL V: CPT | Mod: PBBFAC,,, | Performed by: NURSE PRACTITIONER

## 2025-08-29 RX ORDER — AMOXICILLIN 500 MG/1
500 TABLET, FILM COATED ORAL 3 TIMES DAILY
COMMUNITY
Start: 2025-08-22